# Patient Record
Sex: FEMALE | Race: WHITE | NOT HISPANIC OR LATINO | Employment: FULL TIME | URBAN - METROPOLITAN AREA
[De-identification: names, ages, dates, MRNs, and addresses within clinical notes are randomized per-mention and may not be internally consistent; named-entity substitution may affect disease eponyms.]

---

## 2017-03-09 ENCOUNTER — OFFICE VISIT (OUTPATIENT)
Dept: VASCULAR LAB | Facility: MEDICAL CENTER | Age: 52
End: 2017-03-09
Attending: INTERNAL MEDICINE
Payer: COMMERCIAL

## 2017-03-09 VITALS
BODY MASS INDEX: 21.16 KG/M2 | HEART RATE: 75 BPM | DIASTOLIC BLOOD PRESSURE: 61 MMHG | SYSTOLIC BLOOD PRESSURE: 116 MMHG | HEIGHT: 65 IN | WEIGHT: 127 LBS

## 2017-03-09 DIAGNOSIS — E78.5 DYSLIPIDEMIA: ICD-10-CM

## 2017-03-09 DIAGNOSIS — R00.2 PALPITATIONS: ICD-10-CM

## 2017-03-09 PROCEDURE — 99212 OFFICE O/P EST SF 10 MIN: CPT

## 2017-03-09 PROCEDURE — 99203 OFFICE O/P NEW LOW 30 MIN: CPT | Performed by: INTERNAL MEDICINE

## 2017-03-09 ASSESSMENT — ENCOUNTER SYMPTOMS
MYALGIAS: 0
COUGH: 0
DOUBLE VISION: 0
SPEECH CHANGE: 0
DEPRESSION: 0
PALPITATIONS: 1
BRUISES/BLEEDS EASILY: 0
NERVOUS/ANXIOUS: 1
FOCAL WEAKNESS: 0
DIARRHEA: 0
HEADACHES: 0
BLURRED VISION: 0
LOSS OF CONSCIOUSNESS: 0
DIZZINESS: 0
SHORTNESS OF BREATH: 0
CONSTIPATION: 0

## 2017-03-09 NOTE — PROGRESS NOTES
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  Had echo at that time - mvp.  Has not had echo since.  Has had intermittent palpitations since that time - worse in /feb  Great exercise tolerance.  No chest pain.   No lightheadedness. Not associated with exercise  No tia or cva symptoms  Has long h/o high LDL and high HDL  Never taken statins  TSH normal a few years ago.  No known kidney disease  No other CVD risk factors    Past Medical History   Diagnosis Date   • Anesthesia      Nausea in recovery   • Arthritis    • High cholesterol      Past Surgical History   Procedure Laterality Date   • Primary c section          • Cholecystectomy     • Blepharoplasty     • Bone biopsy Left 2016     Procedure: WRIST BIOPSY WITH POSSIBLE BONE GRAFT ALLO ;  Surgeon: Benjamin Menezes M.D.;  Location: SURGERY [facility];  Service:      Family History   Problem Relation Age of Onset   • Cancer     • Cancer Paternal Aunt      History   Smoking status   • Never Smoker    Smokeless tobacco   • Never Used     Social History   Substance Use Topics   • Smoking status: Never Smoker    • Smokeless tobacco: Never Used   • Alcohol Use: Yes      Comment: 1/ day     Outpatient Encounter Prescriptions as of 3/9/2017   Medication Sig Dispense Refill   • Calcium-Magnesium-Vitamin D (CALCIUM MAGNESIUM PO) Take  by mouth every day.     • acetaminophen (TYLENOL) 325 MG Tab Take 650 mg by mouth every four hours as needed.     • lorazepam (ATIVAN) 1 MG Tab Take 1 mg by mouth 1 time daily as needed for Anxiety.     • Ibuprofen 200 MG Cap Take 200 mg by mouth as needed.       No facility-administered encounter medications on file as of 3/9/2017.     Allergies   Allergen Reactions   •  "Penicillins Hives     DIET AND EXERCISE:  Weight Change:stable  Diet: med style diet  Exercise: moderate regular exercise program     Review of Systems   Constitutional: Negative for malaise/fatigue.   HENT: Negative for hearing loss and tinnitus.    Eyes: Negative for blurred vision and double vision.   Respiratory: Negative for cough and shortness of breath.    Cardiovascular: Positive for palpitations. Negative for chest pain and leg swelling.   Gastrointestinal: Negative for diarrhea and constipation.   Genitourinary: Negative for frequency and hematuria.   Musculoskeletal: Negative for myalgias.   Skin: Negative for rash.   Neurological: Negative for dizziness, speech change, focal weakness, loss of consciousness and headaches.   Endo/Heme/Allergies: Does not bruise/bleed easily.   Psychiatric/Behavioral: Negative for depression. The patient is nervous/anxious.       Objective:     Filed Vitals:    03/09/17 0909   BP: 116/61   Pulse: 75   Height: 1.651 m (5' 5\")   Weight: 57.607 kg (127 lb)      Body mass index is 21.13 kg/(m^2).  Physical Exam   Constitutional: She is oriented to person, place, and time. She appears well-developed and well-nourished. No distress.   HENT:   Head: Normocephalic and atraumatic.   Eyes: Conjunctivae and EOM are normal. Pupils are equal, round, and reactive to light. No scleral icterus.   Neck: Normal range of motion. Neck supple. No JVD present. No thyromegaly present.   Cardiovascular: Normal rate, regular rhythm and intact distal pulses.  Exam reveals no gallop and no friction rub.    Murmur heard.  Faint systolic murmur   Pulmonary/Chest: Effort normal and breath sounds normal. No respiratory distress. She has no wheezes. She has no rales. She exhibits no tenderness.   Abdominal: Soft. Bowel sounds are normal. She exhibits no distension and no mass. There is no tenderness. There is no rebound and no guarding.   Musculoskeletal: Normal range of motion. She exhibits no edema or " tenderness.   Neurological: She is alert and oriented to person, place, and time. She has normal reflexes. No cranial nerve deficit. Coordination normal.   Skin: Skin is warm and dry. No rash noted. She is not diaphoretic. No erythema. No pallor.   Psychiatric: She has a normal mood and affect.   Nursing note and vitals reviewed.    Lab Results   Component Value Date    CHOLSTRLTOT 210* 08/26/2016    * 08/26/2016    HDL 85 08/26/2016    TRIGLYCERIDE 72 08/26/2016           Lab Results   Component Value Date    GLUCOSE 80 08/26/2016          Medical Decision Making:  Today's Assessment / Status / Plan:     1. Palpitations  COMP METABOLIC PANEL    TSH    Echocardiogram Comp w/o Cont   2. Dyslipidemia  LIPOPROTEIN QT BLOOD BY NMR    CRP HIGH SENSITIVE (CARDIAC)     Patient Type: Primary Prevention    Etiology of Established CVD if Present: 1%    Lipid Management: Qualifies for Statin Therapy Based on 2013 ACC/AHA Guidelines: no  Calculated 10-Year Risk of ASCVD: 1%  Currently on Statin: No  Appears to be low risk.  - Will check NMR lipoprofile and CRP for further risk assessement  - not interested in cor calcium score at present - which seems reasonable  - continue TLC    Blood Pressure Management:Goal: JNC8 (2013) Office BP Goal:<140/90; Under Control: yes  - continue lifestyle mod    Glycemic Status: Normal  - check fasting glucose    Anti-Platelet/Anti-Coagulant Tx: not indicated    Smoking: continue complete avoidance     Physical Activity: continue excellent exercise habits    Weight Management and Nutrition: Dietary plan was discussed with patient at this visit including continued med style diet    Other:     1.  Palpitations - long standing.  Apparently associated with MVP.  Will recheck echo and TSH and electrolytes.  Consider rhythm monitor only if worsens in frequency, intensity, or develops associated symptoms like lightheadedness.    Instructed to follow-up with PCP for remainder of adult medical  needs: yes  We will partner with other providers in the management of established vascular disease and cardiometabolic risk factors.    Studies to Be Obtained: Echo  Labs to Be Obtained: NMR lipoprofile, CMP, TSH, crp    Follow up in: by phone when results available    Michael J Bloch, M.D.

## 2017-03-14 ENCOUNTER — HOSPITAL ENCOUNTER (OUTPATIENT)
Dept: LAB | Facility: MEDICAL CENTER | Age: 52
End: 2017-03-14
Attending: INTERNAL MEDICINE
Payer: COMMERCIAL

## 2017-03-14 DIAGNOSIS — R00.2 PALPITATIONS: ICD-10-CM

## 2017-03-14 DIAGNOSIS — E78.5 DYSLIPIDEMIA: ICD-10-CM

## 2017-03-14 LAB
ALBUMIN SERPL BCP-MCNC: 4.1 G/DL (ref 3.2–4.9)
ALBUMIN/GLOB SERPL: 1.4 G/DL
ALP SERPL-CCNC: 37 U/L (ref 30–99)
ALT SERPL-CCNC: 14 U/L (ref 2–50)
ANION GAP SERPL CALC-SCNC: 5 MMOL/L (ref 0–11.9)
AST SERPL-CCNC: 17 U/L (ref 12–45)
BILIRUB SERPL-MCNC: 0.4 MG/DL (ref 0.1–1.5)
BUN SERPL-MCNC: 14 MG/DL (ref 8–22)
CALCIUM SERPL-MCNC: 9.4 MG/DL (ref 8.5–10.5)
CHLORIDE SERPL-SCNC: 103 MMOL/L (ref 96–112)
CO2 SERPL-SCNC: 26 MMOL/L (ref 20–33)
CREAT SERPL-MCNC: 0.91 MG/DL (ref 0.5–1.4)
CRP SERPL HS-MCNC: 0.4 MG/L (ref 0–7.5)
GLOBULIN SER CALC-MCNC: 2.9 G/DL (ref 1.9–3.5)
GLUCOSE SERPL-MCNC: 90 MG/DL (ref 65–99)
POTASSIUM SERPL-SCNC: 4.3 MMOL/L (ref 3.6–5.5)
PROT SERPL-MCNC: 7 G/DL (ref 6–8.2)
SODIUM SERPL-SCNC: 134 MMOL/L (ref 135–145)
TSH SERPL DL<=0.005 MIU/L-ACNC: 4 UIU/ML (ref 0.3–3.7)

## 2017-03-14 PROCEDURE — 80061 LIPID PANEL: CPT

## 2017-03-14 PROCEDURE — 86141 C-REACTIVE PROTEIN HS: CPT

## 2017-03-14 PROCEDURE — 36415 COLL VENOUS BLD VENIPUNCTURE: CPT

## 2017-03-14 PROCEDURE — 80053 COMPREHEN METABOLIC PANEL: CPT

## 2017-03-14 PROCEDURE — 84443 ASSAY THYROID STIM HORMONE: CPT

## 2017-03-14 PROCEDURE — 83704 LIPOPROTEIN BLD QUAN PART: CPT

## 2017-03-17 LAB
CHOLEST SERPL-MCNC: 206 MG/DL (ref 100–199)
HDL PARTICAL NO Q4363: 42.7 UMOL/L
HDL SERPL QN: 10 NM
HDLC SERPL-MCNC: 84 MG/DL
HLD.LARGE SERPL-SCNC: 13.2 UMOL/L
LDL MED SERPL QN: 22.2 NM
LDL SERPL QN: 22.2 NM
LDL SERPL-SCNC: 1009 NMOL/L
LDL SMALL SERPL-SCNC: <90 NMOL/L
LDL SMALL SERPL-SCNC: <90 NMOL/L
LDLC SERPL CALC-MCNC: 107 MG/DL (ref 0–99)
LP IR SCORE Q4364: <25
TRIGL SERPL-MCNC: 76 MG/DL (ref 0–149)
VLDL LARGE SERPL-SCNC: 1.5 NMOL/L
VLDL SERPL QN: 47 NM

## 2017-04-03 ENCOUNTER — HOSPITAL ENCOUNTER (OUTPATIENT)
Dept: CARDIOLOGY | Facility: MEDICAL CENTER | Age: 52
End: 2017-04-03
Attending: INTERNAL MEDICINE
Payer: COMMERCIAL

## 2017-04-03 DIAGNOSIS — R00.2 PALPITATIONS: ICD-10-CM

## 2017-04-03 LAB — LV EJECT FRACT  99904: 60

## 2017-04-03 PROCEDURE — 93306 TTE W/DOPPLER COMPLETE: CPT

## 2017-04-03 PROCEDURE — 93306 TTE W/DOPPLER COMPLETE: CPT | Mod: 26 | Performed by: INTERNAL MEDICINE

## 2017-04-06 ENCOUNTER — TELEPHONE (OUTPATIENT)
Dept: VASCULAR LAB | Facility: MEDICAL CENTER | Age: 52
End: 2017-04-06

## 2017-04-06 NOTE — TELEPHONE ENCOUNTER
Discussed lab work with patient at that on the phone today.    Her LDL particle size and small LDL particle number are favorable. Her CRP is favorable. I recommend continued lifestyle modification alone at this time.     Her TSH is 4.0, but she has no symptoms of hypothyroidism. I would suggest we continue to follow her TFTs    I have recommended she repeat her blood work and come back to see me in one year    Michael J. Bloch, MD  [facility]    CC: Gus Tate M.D.

## 2017-09-08 ENCOUNTER — EH NON-PROVIDER (OUTPATIENT)
Dept: OCCUPATIONAL MEDICINE | Facility: CLINIC | Age: 52
End: 2017-09-08

## 2017-09-08 PROCEDURE — 94375 RESPIRATORY FLOW VOLUME LOOP: CPT

## 2017-10-17 ENCOUNTER — IMMUNIZATION (OUTPATIENT)
Dept: OCCUPATIONAL MEDICINE | Facility: CLINIC | Age: 52
End: 2017-10-17

## 2017-10-17 DIAGNOSIS — Z23 NEED FOR VACCINATION: ICD-10-CM

## 2017-10-17 PROCEDURE — 90686 IIV4 VACC NO PRSV 0.5 ML IM: CPT | Performed by: PREVENTIVE MEDICINE

## 2017-10-30 ENCOUNTER — HOSPITAL ENCOUNTER (OUTPATIENT)
Dept: RADIOLOGY | Facility: MEDICAL CENTER | Age: 52
End: 2017-10-30
Attending: OBSTETRICS & GYNECOLOGY
Payer: COMMERCIAL

## 2017-10-30 DIAGNOSIS — Z12.31 SCREENING MAMMOGRAM, ENCOUNTER FOR: ICD-10-CM

## 2017-10-30 PROCEDURE — G0202 SCR MAMMO BI INCL CAD: HCPCS

## 2018-06-15 ENCOUNTER — HOSPITAL ENCOUNTER (OUTPATIENT)
Dept: LAB | Facility: MEDICAL CENTER | Age: 53
End: 2018-06-15
Payer: COMMERCIAL

## 2018-06-15 LAB
BDY FAT % MEASURED: 24.7 %
BDY FAT % MEASURED: NORMAL %
BP DIAS: 46 MMHG
BP DIAS: NORMAL MMHG
BP SYS: 107 MMHG
BP SYS: NORMAL MMHG
CHOLEST SERPL-MCNC: 206 MG/DL (ref 100–199)
DIABETES HTDIA: NO
DIABETES HTDIA: NORMAL
EVENT NAME HTEVT: NORMAL
EVENT NAME HTEVT: NORMAL
FASTING HTFAS: YES
GLUCOSE SERPL-MCNC: 71 MG/DL (ref 65–99)
HDLC SERPL-MCNC: 82 MG/DL
HYPERTENSION HTHYP: NO
HYPERTENSION HTHYP: NORMAL
LDLC SERPL CALC-MCNC: 113 MG/DL
SCREENING LOC CITY HTCIT: NORMAL
SCREENING LOC CITY HTCIT: NORMAL
SCREENING LOC STATE HTSTA: NORMAL
SCREENING LOC STATE HTSTA: NORMAL
SCREENING LOCATION HTLOC: NORMAL
SCREENING LOCATION HTLOC: NORMAL
SMOKING HTSMO: NO
SMOKING HTSMO: NORMAL
SUBSCRIBER ID HTSID: NORMAL
SUBSCRIBER ID HTSID: NORMAL
TRIGL SERPL-MCNC: 56 MG/DL (ref 0–149)

## 2018-06-15 PROCEDURE — 36415 COLL VENOUS BLD VENIPUNCTURE: CPT

## 2018-06-15 PROCEDURE — 82947 ASSAY GLUCOSE BLOOD QUANT: CPT

## 2018-06-15 PROCEDURE — 80061 LIPID PANEL: CPT

## 2018-06-15 PROCEDURE — S5190 WELLNESS ASSESSMENT BY NONPH: HCPCS

## 2018-06-15 PROCEDURE — S5190 WELLNESS ASSESSMENT BY NONPH: HCPCS | Mod: 91

## 2018-06-18 ENCOUNTER — HOSPITAL ENCOUNTER (OUTPATIENT)
Dept: LAB | Facility: MEDICAL CENTER | Age: 53
End: 2018-06-18
Payer: COMMERCIAL

## 2018-08-20 ENCOUNTER — TELEPHONE (OUTPATIENT)
Dept: VASCULAR LAB | Facility: MEDICAL CENTER | Age: 53
End: 2018-08-20

## 2018-08-20 NOTE — TELEPHONE ENCOUNTER
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 I asked her if she was interested in coming in this fall to follow up with Dr. Bloch. She said she is, but she just got a new PCP and she wants to see him 1st and see if maybe he can help her with all her medical needs just so she doesn't have to go to so many doctors. She does want to see Dr. Bloch again, but wants to see him 1st.

## 2018-08-23 ENCOUNTER — OFFICE VISIT (OUTPATIENT)
Dept: MEDICAL GROUP | Facility: MEDICAL CENTER | Age: 53
End: 2018-08-23
Payer: COMMERCIAL

## 2018-08-23 VITALS
BODY MASS INDEX: 20.53 KG/M2 | SYSTOLIC BLOOD PRESSURE: 112 MMHG | RESPIRATION RATE: 18 BRPM | OXYGEN SATURATION: 97 % | DIASTOLIC BLOOD PRESSURE: 76 MMHG | TEMPERATURE: 97.8 F | HEIGHT: 65 IN | WEIGHT: 123.24 LBS | HEART RATE: 72 BPM

## 2018-08-23 DIAGNOSIS — F41.9 ANXIETY: ICD-10-CM

## 2018-08-23 DIAGNOSIS — Z00.00 HEALTHCARE MAINTENANCE: ICD-10-CM

## 2018-08-23 DIAGNOSIS — Z72.89 OTHER PROBLEMS RELATED TO LIFESTYLE: ICD-10-CM

## 2018-08-23 DIAGNOSIS — L82.1 SEBORRHEIC KERATOSES: ICD-10-CM

## 2018-08-23 DIAGNOSIS — E78.5 DYSLIPIDEMIA: ICD-10-CM

## 2018-08-23 DIAGNOSIS — M54.2 NECK PAIN: ICD-10-CM

## 2018-08-23 PROCEDURE — 99204 OFFICE O/P NEW MOD 45 MIN: CPT | Mod: 25 | Performed by: FAMILY MEDICINE

## 2018-08-23 PROCEDURE — 17110 DESTRUCTION B9 LES UP TO 14: CPT | Performed by: FAMILY MEDICINE

## 2018-08-23 ASSESSMENT — PATIENT HEALTH QUESTIONNAIRE - PHQ9: CLINICAL INTERPRETATION OF PHQ2 SCORE: 0

## 2018-08-23 NOTE — ASSESSMENT & PLAN NOTE
The patient describes 3 skin lesions, one on her face, one on her right chest and one on her leg. They are bothersome to her because they tend to catch on things and occasionally itch. She has successfully had cryotherapy in the past for these issues.

## 2018-08-23 NOTE — LETTER
[facility]  Ivan Cunha M.D.  [address]  [place] [address]  Fax: [phone]   Authorization for Release/Disclosure of   Protected Health Information   Name: AMENA WERNER MD : 1965 SSN: [id number]   Address: [address] [place] [address] [place] [address] Phone:    [phone] (home) [phone] (work)   I authorize the entity listed below to release/disclose the PHI below to:   [facility]/Ivan Cunha M.D. and Rosemary VILLALOBOS MA   Provider or Entity Name:  [facility]   Address   City, State, Zip:               [address] [place] [address] [place] [address]   Phone:  [phone]      Fax:      [phone]        Reason for request: continuity of care   Information to be released:    [ X ] LAST COLONOSCOPY,  including any PATH REPORT and follow-up  [ X ] LAST FIT/COLOGUARD RESULT [  ] LAST DEXA  [  ] LAST MAMMOGRAM  [  ] LAST PAP  [  ] LAST LABS [  ] RETINA EXAM REPORT  [  ] IMMUNIZATION RECORDS  [  ] Release all info      [  ] Check here and initial the line next to each item to release ALL health information INCLUDING  _____ Care and treatment for drug and / or alcohol abuse  _____ HIV testing, infection status, or AIDS  _____ Genetic Testing    DATES OF SERVICE OR TIME PERIOD TO BE DISCLOSED: _____________  I understand and acknowledge that:  * This Authorization may be revoked at any time by you in writing, except if your health information has already been used or disclosed.  * Your health information that will be used or disclosed as a result of you signing this authorization could be re-disclosed by the recipient. If this occurs, your re-disclosed health information may no longer be protected by State or Federal laws.  * You may refuse to sign this Authorization. Your refusal will not affect your ability to obtain treatment.  * This Authorization becomes effective upon signing and will  on (date) __________.      If no date is indicated, this  Authorization will  one (1) year from the signature date.    Name: Christa Juarez MD    Signature:   Date:     2018       PLEASE FAX REQUESTED RECORDS BACK TO: [phone]

## 2018-08-23 NOTE — LETTER
[facility]  Ivan Cunha M.D.  [address]  [place] [address]  Fax: [phone]   Authorization for Release/Disclosure of   Protected Health Information   Name: CHRISTA WERNER MD : 1965 SSN: [id number]   Address: [address] [place] [address] [address] [place] [address] Phone:    [phone] (home) [phone] (work)   I authorize the entity listed below to release/disclose the PHI below to:   [facility]/Ivan Cunha M.D. and Ivan Cunha M.D.   Provider or Entity Name:     Address   City, State, Zip   Phone:      Fax:     Reason for request: continuity of care   Information to be released:    [  ] LAST COLONOSCOPY,  including any PATH REPORT and follow-up  [  ] LAST FIT/COLOGUARD RESULT [  ] LAST DEXA  [  ] LAST MAMMOGRAM  [  ] LAST PAP  [  ] LAST LABS [  ] RETINA EXAM REPORT  [  ] IMMUNIZATION RECORDS  [  ] Release all info      [  ] Check here and initial the line next to each item to release ALL health information INCLUDING  _____ Care and treatment for drug and / or alcohol abuse  _____ HIV testing, infection status, or AIDS  _____ Genetic Testing    DATES OF SERVICE OR TIME PERIOD TO BE DISCLOSED: _____________  I understand and acknowledge that:  * This Authorization may be revoked at any time by you in writing, except if your health information has already been used or disclosed.  * Your health information that will be used or disclosed as a result of you signing this authorization could be re-disclosed by the recipient. If this occurs, your re-disclosed health information may no longer be protected by State or Federal laws.  * You may refuse to sign this Authorization. Your refusal will not affect your ability to obtain treatment.  * This Authorization becomes effective upon signing and will  on (date) __________.      If no date is indicated, this Authorization will  one (1) year from the signature date.    Name: Christa Werner MD    Signature:   Date:     8/23/2018       PLEASE FAX REQUESTED RECORDS BACK TO: [phone]

## 2018-08-23 NOTE — LETTER
[facility]  Ivan Cunha M.D.  [address]  [place] [address]  Fax: [phone]   Authorization for Release/Disclosure of   Protected Health Information   Name: CHRISTA WERENR MD : 1965 SSN: [id number]   Address: [address] [place] [address] [place] [address] Phone:    [phone] (home) [phone] (work)   I authorize the entity listed below to release/disclose the PHI below to:   [facility]/Ivan Cunha M.D. and Ivan Cunha M.D.   Provider or Entity Name:     Address   City, State, Zip   Phone:      Fax:     Reason for request: continuity of care   Information to be released:    [  ] LAST COLONOSCOPY,  including any PATH REPORT and follow-up  [  ] LAST FIT/COLOGUARD RESULT [  ] LAST DEXA  [  ] LAST MAMMOGRAM  [  ] LAST PAP  [  ] LAST LABS [  ] RETINA EXAM REPORT  [  ] IMMUNIZATION RECORDS  [  ] Release all info      [  ] Check here and initial the line next to each item to release ALL health information INCLUDING  _____ Care and treatment for drug and / or alcohol abuse  _____ HIV testing, infection status, or AIDS  _____ Genetic Testing    DATES OF SERVICE OR TIME PERIOD TO BE DISCLOSED: _____________  I understand and acknowledge that:  * This Authorization may be revoked at any time by you in writing, except if your health information has already been used or disclosed.  * Your health information that will be used or disclosed as a result of you signing this authorization could be re-disclosed by the recipient. If this occurs, your re-disclosed health information may no longer be protected by State or Federal laws.  * You may refuse to sign this Authorization. Your refusal will not affect your ability to obtain treatment.  * This Authorization becomes effective upon signing and will  on (date) __________.      If no date is indicated, this Authorization will  one (1) year from the signature date.    Name: Christa Werner MD    Signature:   Date:     8/23/2018       PLEASE FAX REQUESTED RECORDS BACK TO: [phone]

## 2018-08-23 NOTE — ASSESSMENT & PLAN NOTE
The patient describes a long-standing history of neck pain with some MRI abnormalities she states are not surgical. She uses Ativan as needed for associated spasm and this seems to help quite a bit. Her pain can be moderate in severity.

## 2018-08-23 NOTE — ASSESSMENT & PLAN NOTE
The patient describes a several year history of intermittent, mild to moderate severity anxiety that improves with exercise and occasional Ativan use. She takes Ativan 1 mg as needed, about 3 times per week on average, for both her anxiety and occasional neck pains. There is no associated depression.

## 2018-08-23 NOTE — PROGRESS NOTES
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 This is a new patient to me. The patient comes in alone.     Healthcare maintenance  Lipids: done 2018 with 10 year ASCVD risk 1.2%. No indication for aspirin or statin.  Fasting Glucose: done 2018 and normal.   Hepatitis C Screen: Ordered.  Colonoscopy: Records requested.  Mammogram: done 2017  Pap: will schedule with our physician assistant.    Tdap: given 2014.     Anxiety  The patient describes a several year history of intermittent, mild to moderate severity anxiety that improves with exercise and occasional Ativan use. She takes Ativan 1 mg as needed, about 3 times per week on average, for both her anxiety and occasional neck pains. There is no associated depression.    Neck pain  The patient describes a long-standing history of neck pain with some MRI abnormalities she states are not surgical. She uses Ativan as needed for associated spasm and this seems to help quite a bit. Her pain can be moderate in severity.    Seborrheic keratoses  The patient describes 3 skin lesions, one on her face, one on her right chest and one on her leg. They are bothersome to her because they tend to catch on things and occasionally itch. She has successfully had cryotherapy in the past for these issues.    Dyslipidemia  The patient has a mild dyslipidemia without the need for aspirin or statin currently.      Current Outpatient Prescriptions on File Prior to Visit   Medication Sig Dispense Refill   • lorazepam (ATIVAN) 1 MG Tab Take 1 mg by mouth 1 time daily as needed for Anxiety.     • Ibuprofen 200 MG Cap Take 200 mg by mouth as needed.     • Calcium-Magnesium-Vitamin D (CALCIUM MAGNESIUM PO) Take  by mouth every day.       No current facility-administered medications on file prior to visit.        Past Medical History:   Diagnosis Date  "  • Anesthesia     Nausea in recovery   • Arthritis    • High cholesterol        Allergies: Penicillins    Surgical History:  has a past surgical history that includes primary c section (1998); cholecystectomy (1999); blepharoplasty (2008); and bone biopsy (Left, 2/12/2016).    Family History: family history includes Arthritis in her brother and mother; Cancer in her brother, paternal aunt, and unknown relative; Hyperlipidemia in her mother.    Social History:  reports that she has never smoked. She has never used smokeless tobacco. She reports that she drinks alcohol. She reports that she does not use drugs.    ROS:   Constitutional ROS: No unexplained fevers, sweats, or chills  Eye ROS: No eye pain, redness, discharge  Ear ROS: No ear pain  Mouth/Throat ROS: No sore throat  Neck ROS: No swollen glands  Pulmonary ROS: No shortness of breath  Cardiovascular ROS: No chest pain  Gastrointestinal ROS: No abdominal pain  Musculoskeletal/Extremities ROS: No clubbing  Neurologic ROS: No seizures  Psychiatric ROS: No depression       Objective:     Vitals:    08/23/18 0909   BP: 112/76   Pulse: 72   Resp: 18   Temp: 36.6 °C (97.8 °F)   SpO2: 97%   Weight: 55.9 kg (123 lb 3.8 oz)   Height: 1.651 m (5' 5\")       Physical Exam:  Constitutional: Alert, no distress.  Skin: Warm, dry, good turgor, no rashes in visible areas. There are 3 small skin lesions with an \"stuck on\" appearance that are slightly pigmented. One is located on the left thigh, one is located on the right forehead and one is located on the right chest.   Eye: Equal, round and reactive, conjunctiva clear, lids normal.  ENMT: Lips without lesions, good dentition, oropharynx clear.  Neck: Trachea midline, no masses, no thyromegaly. No cervical or supraclavicular lymphadenopathy  Respiratory: Unlabored respiratory effort, lungs clear to auscultation, no wheezes, no ronchi.  Cardiovascular: Normal S1, S2, no murmur, no edema.  Abdomen: Soft, non-tender, no masses. "   Psych: Alert and oriented, normal affect.     Cryotherapy Procedure Note:  I discussed the risks and benefits of cryotherapy with the patient who gave verbal consent for the procedure. Two applications of cryotherapy were applied to the three SK's described in the exam. The patient tolerated the procedure well and there were no complications. Aftercare was discussed.      Assessment and Plan:     1. Healthcare maintenance  - see HPI.   - discussed diet and exercise, Mediterranean Diet handout given.   - patient will schedule with one of our PA-C's for a pap.     2. Seborrheic keratoses  - cryotherapy (see procedure note above).     3. Neck pain  Patient treats supportive with stretching and rare ativan.   - continue current treatment.     4. Other problems related to lifestyle  - HEP C VIRUS ANTIBODY; Future    5. Anxiety  - continue exercise, rare ativan use.     6. Dyslipidemia  - discussed diet and exercise, Mediterranean Diet handout given.         Followup: Return in about 1 year (around 8/23/2019), or if symptoms worsen or fail to improve.

## 2018-08-23 NOTE — ASSESSMENT & PLAN NOTE
Lipids: done 2018 with 10 year ASCVD risk 1.2%. No indication for aspirin or statin.  Fasting Glucose: done 2018 and normal.   Hepatitis C Screen: Ordered.  Colonoscopy: Records requested.  Mammogram: done 2017  Pap: will schedule with our physician assistant.    Tdap: given 2014.

## 2018-10-01 ENCOUNTER — IMMUNIZATION (OUTPATIENT)
Dept: OCCUPATIONAL MEDICINE | Facility: CLINIC | Age: 53
End: 2018-10-01

## 2018-10-01 DIAGNOSIS — Z23 NEED FOR VACCINATION: ICD-10-CM

## 2018-10-04 ENCOUNTER — OFFICE VISIT (OUTPATIENT)
Dept: MEDICAL GROUP | Facility: MEDICAL CENTER | Age: 53
End: 2018-10-04
Payer: COMMERCIAL

## 2018-10-04 ENCOUNTER — HOSPITAL ENCOUNTER (OUTPATIENT)
Facility: MEDICAL CENTER | Age: 53
End: 2018-10-04
Attending: PHYSICIAN ASSISTANT
Payer: COMMERCIAL

## 2018-10-04 VITALS
SYSTOLIC BLOOD PRESSURE: 102 MMHG | WEIGHT: 126.6 LBS | HEART RATE: 79 BPM | BODY MASS INDEX: 21.09 KG/M2 | HEIGHT: 65 IN | OXYGEN SATURATION: 98 % | DIASTOLIC BLOOD PRESSURE: 70 MMHG

## 2018-10-04 DIAGNOSIS — Z12.4 SCREENING FOR CERVICAL CANCER: ICD-10-CM

## 2018-10-04 DIAGNOSIS — Z00.00 WELLNESS EXAMINATION: ICD-10-CM

## 2018-10-04 PROCEDURE — 87624 HPV HI-RISK TYP POOLED RSLT: CPT

## 2018-10-04 PROCEDURE — 99396 PREV VISIT EST AGE 40-64: CPT | Performed by: PHYSICIAN ASSISTANT

## 2018-10-04 PROCEDURE — 88175 CYTOPATH C/V AUTO FLUID REDO: CPT

## 2018-10-04 NOTE — PROGRESS NOTES
"SUBJECTIVE: 53 y.o. female for annual routine pap and checkup.  Chief Complaint   Patient presents with   • Gynecologic Exam       Last Pap: 2 years ago  H/O Abnormal Pap yes, years ago, recently normal       Allergies: Penicillins     ROS:  Menses every month, having some perimenopausal changes including hot flashes and irritability    No pelvic pain, or dyspareunia. No vaginal discharge   No breast tenderness, mass, nipple discharge, changes in size or contour, or abnormal cyclic discomfort.  No urinary tract symptoms, no incontinence.   No abdominal pain, change in bowel habits, black or bloody stools.    No unusual headaches, no visual changes.   No prolonged cough. No dyspnea or chest pain on exertion.        Current Outpatient Prescriptions   Medication Sig Dispense Refill   • lorazepam (ATIVAN) 1 MG Tab Take 1 mg by mouth 1 time daily as needed for Anxiety.     • Ibuprofen 200 MG Cap Take 200 mg by mouth as needed.     • Calcium-Magnesium-Vitamin D (CALCIUM MAGNESIUM PO) Take  by mouth every day.       No current facility-administered medications for this visit.      She  has a past medical history of Anesthesia; Arthritis; and High cholesterol.  She  has a past surgical history that includes primary c section (1998); cholecystectomy (1999); blepharoplasty (2008); and bone biopsy (Left, 2/12/2016).     Family History:   Family History   Problem Relation Age of Onset   • Cancer Unknown    • Cancer Paternal Aunt    • Arthritis Mother    • Hyperlipidemia Mother    • Arthritis Brother    • Cancer Brother         Prostate     OBJECTIVE:   /70 (BP Location: Right arm, Patient Position: Sitting)   Pulse 79   Ht 1.651 m (5' 5\")   Wt 57.4 kg (126 lb 9.6 oz)   SpO2 98%   BMI 21.07 kg/m²   Body mass index is 21.07 kg/m².    Breast Exam: Performed with instruction during examination. No axillary lymphadenopathy, no skin changes, no dominant masses. No nipple retraction  Pelvic Exam - Sure Path Pap obtained and " specimen sent to lab. Normal external genitalia with no lesions. Normal vaginal mucosa with normal rugation and no discharge. Cervix with no visible lesions. No cervical motion tenderness. Uterus is normal sized with no masses. No adnexal tenderness or enlargement appreciated.      <ASSESSMENT>  1. Wellness examination     2. Screening for cervical cancer  THINPREP PAP WITH HPV     F/u with Dr. Cunha as scheduled

## 2018-10-05 DIAGNOSIS — Z12.4 SCREENING FOR CERVICAL CANCER: ICD-10-CM

## 2018-10-06 LAB
CYTOLOGY REG CYTOL: NORMAL
HPV HR 12 DNA CVX QL NAA+PROBE: NEGATIVE
HPV16 DNA SPEC QL NAA+PROBE: NEGATIVE
HPV18 DNA SPEC QL NAA+PROBE: NEGATIVE
SPECIMEN SOURCE: NORMAL

## 2018-10-15 PROCEDURE — 90686 IIV4 VACC NO PRSV 0.5 ML IM: CPT | Performed by: PREVENTIVE MEDICINE

## 2018-11-14 ENCOUNTER — HOSPITAL ENCOUNTER (OUTPATIENT)
Dept: RADIOLOGY | Facility: MEDICAL CENTER | Age: 53
End: 2018-11-14
Attending: FAMILY MEDICINE
Payer: COMMERCIAL

## 2018-11-14 DIAGNOSIS — Z12.31 VISIT FOR SCREENING MAMMOGRAM: ICD-10-CM

## 2018-11-14 PROCEDURE — 77067 SCR MAMMO BI INCL CAD: CPT

## 2019-06-11 ENCOUNTER — HOSPITAL ENCOUNTER (OUTPATIENT)
Dept: LAB | Facility: MEDICAL CENTER | Age: 54
End: 2019-06-11
Payer: COMMERCIAL

## 2019-06-11 ENCOUNTER — HOSPITAL ENCOUNTER (OUTPATIENT)
Dept: LAB | Facility: MEDICAL CENTER | Age: 54
End: 2019-06-11
Attending: FAMILY MEDICINE
Payer: COMMERCIAL

## 2019-06-11 DIAGNOSIS — Z72.89 OTHER PROBLEMS RELATED TO LIFESTYLE: ICD-10-CM

## 2019-06-11 LAB
BDY FAT % MEASURED: 25.8 %
BP DIAS: 58 MMHG
BP SYS: 103 MMHG
CHOLEST SERPL-MCNC: 219 MG/DL (ref 100–199)
DIABETES HTDIA: NO
EVENT NAME HTEVT: NORMAL
FASTING HTFAS: YES
GLUCOSE SERPL-MCNC: 89 MG/DL (ref 65–99)
HCV AB SER QL: NEGATIVE
HDLC SERPL-MCNC: 81 MG/DL
HYPERTENSION HTHYP: NO
LDLC SERPL CALC-MCNC: 123 MG/DL
SCREENING LOC CITY HTCIT: NORMAL
SCREENING LOC STATE HTSTA: NORMAL
SCREENING LOCATION HTLOC: NORMAL
SMOKING HTSMO: NO
SUBSCRIBER ID HTSID: NORMAL
TRIGL SERPL-MCNC: 73 MG/DL (ref 0–149)

## 2019-06-11 PROCEDURE — 36415 COLL VENOUS BLD VENIPUNCTURE: CPT

## 2019-06-11 PROCEDURE — S5190 WELLNESS ASSESSMENT BY NONPH: HCPCS

## 2019-06-11 PROCEDURE — 82947 ASSAY GLUCOSE BLOOD QUANT: CPT

## 2019-06-11 PROCEDURE — 86803 HEPATITIS C AB TEST: CPT

## 2019-06-11 PROCEDURE — 80061 LIPID PANEL: CPT

## 2019-08-15 ENCOUNTER — OFFICE VISIT (OUTPATIENT)
Dept: MEDICAL GROUP | Facility: MEDICAL CENTER | Age: 54
End: 2019-08-15
Payer: COMMERCIAL

## 2019-08-15 VITALS
RESPIRATION RATE: 18 BRPM | OXYGEN SATURATION: 97 % | SYSTOLIC BLOOD PRESSURE: 110 MMHG | DIASTOLIC BLOOD PRESSURE: 72 MMHG | WEIGHT: 122.6 LBS | HEIGHT: 65 IN | TEMPERATURE: 98.3 F | HEART RATE: 75 BPM | BODY MASS INDEX: 20.43 KG/M2

## 2019-08-15 DIAGNOSIS — M54.2 NECK PAIN: ICD-10-CM

## 2019-08-15 DIAGNOSIS — E78.5 DYSLIPIDEMIA: ICD-10-CM

## 2019-08-15 DIAGNOSIS — Z00.00 HEALTHCARE MAINTENANCE: ICD-10-CM

## 2019-08-15 PROCEDURE — 99396 PREV VISIT EST AGE 40-64: CPT | Performed by: FAMILY MEDICINE

## 2019-08-15 RX ORDER — LORAZEPAM 1 MG/1
1 TABLET ORAL 2 TIMES DAILY PRN
Qty: 60 TAB | Refills: 0 | Status: SHIPPED | OUTPATIENT
Start: 2019-08-15 | End: 2020-02-11 | Stop reason: SDUPTHER

## 2019-08-15 ASSESSMENT — PATIENT HEALTH QUESTIONNAIRE - PHQ9: CLINICAL INTERPRETATION OF PHQ2 SCORE: 0

## 2019-08-15 NOTE — ASSESSMENT & PLAN NOTE
Lipids: done 2019.  Fasting Glucose: done 2019.   Hepatitis C Screen: done 2019 and normal.   Colonoscopy: Records re-requested.  Mammogram: completed.  Pap: done 10/2018 with cotesting and normal.     Tdap: given 2014.   Shingrix: recommended, patient will get at pharmacy.

## 2019-08-15 NOTE — ASSESSMENT & PLAN NOTE
The patient describes a long-standing history of neck pain with some MRI abnormalities she stated at the last visit are not surgical, however, today she states that she was told she needed emergency surgery over 10 years ago.  States she is doing well and does not believe she needs surgery.  She uses Ativan as needed for associated spasm and this seems to help quite a bit. She hasn't refilled this in some time.  She denies paresthesias or weakness.  She denies gait disturbance.

## 2019-08-15 NOTE — PROGRESS NOTES
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 The patient is alone.     Dyslipidemia  Patient has a slight dyslipidemia with a 10-year ASCVD risk of just over 1%.    Healthcare maintenance  Lipids: done 2019.  Fasting Glucose: done 2019.   Hepatitis C Screen: done 2019 and normal.   Colonoscopy: Records re-requested.  Mammogram: completed.  Pap: done 10/2018 with cotesting and normal.     Tdap: given 2014.   Shingrix: recommended, patient will get at pharmacy.     Neck pain  The patient describes a long-standing history of neck pain with some MRI abnormalities she stated at the last visit are not surgical, however, today she states that she was told she needed emergency surgery over 10 years ago.  States she is doing well and does not believe she needs surgery.  She uses Ativan as needed for associated spasm and this seems to help quite a bit. She hasn't refilled this in some time.  She denies paresthesias or weakness.  She denies gait disturbance.    Controlled Substance Assessment:     - Is the medication, if previously prescribed, working as intended and expected to treat   the patients symptoms: yes.     - Does the patient have a history of substance abuse: no.     - has the patient demonstrated aberrant behavior or intoxication: no.     - Is there reason to believe that the patient is not using medication as prescribed or diverting the medication: no.     - Is there reason to believe that the patient is currently misusing this medication or addicted to the controlled substance: no.     - Is it necessary to verify that controlled substances, other that those authorized under the treatment plan, are not present in the body of this patient: no.    - Are there any blood or urine test that indicate inappropriate use of the medication or other controlled substances : no.     - I have reviewed the . Does the   report irregular/inconsistent medication use or indicate that the medication is being used inappropriately or that the patient is using another controlled substance not prescribed or known to me: no.     - Is there reason to believe that the patient is using other drugs, including alcohol, prescription or illicit drugs, that may interact negatively with controlled substance or have not been prescribed by a practitioner who is treating the patient: no.     - Are there any known early refill attempts or claims that medication has been lost or stolen: no.     - Are there are any major changes in the patient's mental or physical health that would affect the medical appropriateness of this medication: no.     - Has the patient increased the dose of medication without provider authorization: no.    - Has the patient been reluctant to cooperate with any exam, analysis or test recommended by me: no.     - Has the patient demonstrated reluctance to stop or reduce use of the medicine: no.     - Has the patient requested or demanded a controlled substance that is likely to be abused or cause dependency or addiction: requests ativan, uses sparingly.     - Is there any other evidence that the patient is chronically using opioids, misusing, abusing, illegally using or addicted to any drug or failing to comply with the instructions of the practitioner concerning the use of the controlled substance: no    - Are there any other factors that the practitioner determines is necessary to make an informed professional judgment concerning the medical appropriateness of the prescription: no.       Current Outpatient Medications on File Prior to Visit   Medication Sig Dispense Refill   • Ibuprofen 200 MG Cap Take 200 mg by mouth as needed.     • Calcium-Magnesium-Vitamin D (CALCIUM MAGNESIUM PO) Take  by mouth every day.       No current facility-administered medications on file prior to visit.        Past Medical History:   Diagnosis  "Date   • Anesthesia     Nausea in recovery   • Arthritis    • High cholesterol        Allergies: Penicillins    Surgical History:  has a past surgical history that includes primary c section (1998); cholecystectomy (1999); blepharoplasty (2008); and bone biopsy (Left, 2/12/2016).    Family History: family history includes Arthritis in her brother and mother; Cancer in her brother, paternal aunt, and another family member; Hyperlipidemia in her mother.    Social History:  reports that she has never smoked. She has never used smokeless tobacco. She reports that she drinks alcohol. She reports that she does not use drugs.    ROS: no fever or nausea.        Objective:     Vitals:    08/15/19 1307   BP: 110/72   BP Location: Left arm   Patient Position: Sitting   BP Cuff Size: Adult   Pulse: 75   Resp: 18   Temp: 36.8 °C (98.3 °F)   TempSrc: Temporal   SpO2: 97%   Weight: 55.6 kg (122 lb 9.6 oz)   Height: 1.651 m (5' 5\")       Physical Exam:  General: alert in no apparent distress.   Cardio: regular rate and rhythm, no murmurs, rubs or gallops.  2+ radial pulses bilaterally.   Resp: CTAB no w/r/r.   Neuro: Sensation intact in all dermatomal distributions of the bilateral upper extremities.  MSK: 5/5 BUE and BLE. Gait normal. Negative Spurlings sign.         Assessment and Plan:     1. Neck pain  Although the patient has a normal exam today with no evidence of myelopathy, weakness or radicular symptoms, considering the history I did recommend a repeat MRI.  The patient declines.  She does use Ativan for spasm and this seems to help well.  I also offered physical medicine consultation but she declines.  I informed her to let me know if she changes her mind about the MRI, which I think would be helpful.  - LORazepam (ATIVAN) 1 MG Tab; Take 1 Tab by mouth 2 times a day as needed (muscle spasm) for up to 30 days.  Dispense: 60 Tab; Refill: 0    2. Healthcare maintenance  - see HPI.   - discussed diet and exercise.     3. " Dyslipidemia  - diet and exercise.         Followup: Return in about 1 year (around 8/15/2020), or if symptoms worsen or fail to improve, for Wellness Visit, Long.

## 2019-09-24 ENCOUNTER — IMMUNIZATION (OUTPATIENT)
Dept: OCCUPATIONAL MEDICINE | Facility: CLINIC | Age: 54
End: 2019-09-24

## 2019-09-24 DIAGNOSIS — Z23 NEED FOR VACCINATION: ICD-10-CM

## 2019-09-24 PROCEDURE — 90686 IIV4 VACC NO PRSV 0.5 ML IM: CPT | Performed by: PREVENTIVE MEDICINE

## 2019-10-27 ENCOUNTER — OFFICE VISIT (OUTPATIENT)
Dept: URGENT CARE | Facility: CLINIC | Age: 54
End: 2019-10-27
Payer: COMMERCIAL

## 2019-10-27 VITALS
BODY MASS INDEX: 20.89 KG/M2 | WEIGHT: 125.4 LBS | RESPIRATION RATE: 14 BRPM | DIASTOLIC BLOOD PRESSURE: 68 MMHG | HEIGHT: 65 IN | SYSTOLIC BLOOD PRESSURE: 128 MMHG | OXYGEN SATURATION: 100 % | HEART RATE: 83 BPM | TEMPERATURE: 98.3 F

## 2019-10-27 DIAGNOSIS — R19.7 DIARRHEA, UNSPECIFIED TYPE: ICD-10-CM

## 2019-10-27 LAB
APPEARANCE UR: CLEAR
BILIRUB UR STRIP-MCNC: NEGATIVE MG/DL
COLOR UR AUTO: YELLOW
GLUCOSE UR STRIP.AUTO-MCNC: NEGATIVE MG/DL
KETONES UR STRIP.AUTO-MCNC: NEGATIVE MG/DL
LEUKOCYTE ESTERASE UR QL STRIP.AUTO: NEGATIVE
NITRITE UR QL STRIP.AUTO: NEGATIVE
PH UR STRIP.AUTO: 7 [PH] (ref 5–8)
PROT UR QL STRIP: NEGATIVE MG/DL
RBC UR QL AUTO: NORMAL
SP GR UR STRIP.AUTO: 1.01
UROBILINOGEN UR STRIP-MCNC: 0.2 MG/DL

## 2019-10-27 ASSESSMENT — ENCOUNTER SYMPTOMS
ABDOMINAL PAIN: 1
VOMITING: 0
BLOATING: 1
BLOOD IN STOOL: 0
DIARRHEA: 1
FEVER: 0
NAUSEA: 0

## 2019-10-27 NOTE — PATIENT INSTRUCTIONS
Diarrhea, Adult  Diarrhea is frequent loose and watery bowel movements. Diarrhea can make you feel weak and cause you to become dehydrated. Dehydration can make you tired and thirsty, cause you to have a dry mouth, and decrease how often you urinate. Diarrhea typically lasts 2-3 days. However, it can last longer if it is a sign of something more serious. It is important to treat your diarrhea as told by your health care provider.  Follow these instructions at home:  Eating and drinking  Follow these recommendations as told by your health care provider:  · Take an oral rehydration solution (ORS). This is a drink that is sold at pharmacies and retail stores.  · Drink clear fluids, such as water, ice chips, diluted fruit juice, and low-calorie sports drinks.  · Eat bland, easy-to-digest foods in small amounts as you are able. These foods include bananas, applesauce, rice, lean meats, toast, and crackers.  · Avoid drinking fluids that contain a lot of sugar or caffeine, such as energy drinks, sports drinks, and soda.  · Avoid alcohol.  · Avoid spicy or fatty foods.  General instructions  · Drink enough fluid to keep your urine clear or pale yellow.  · Wash your hands often. If soap and water are not available, use hand .  · Make sure that all people in your household wash their hands well and often.  · Take over-the-counter and prescription medicines only as told by your health care provider.  · Rest at home while you recover.  · Watch your condition for any changes.  · Take a warm bath to relieve any burning or pain from frequent diarrhea episodes.  · Keep all follow-up visits as told by your health care provider. This is important.  Contact a health care provider if:  · You have a fever.  · Your diarrhea gets worse.  · You have new symptoms.  · You cannot keep fluids down.  · You feel light-headed or dizzy.  · You have a headache  · You have muscle cramps.  Get help right away if:  · You have chest  pain.  · You feel extremely weak or you faint.  · You have bloody or black stools or stools that look like tar.  · You have severe pain, cramping, or bloating in your abdomen.  · You have trouble breathing or you are breathing very quickly.  · Your heart is beating very quickly.  · Your skin feels cold and clammy.  · You feel confused.  · You have signs of dehydration, such as:  ¨ Dark urine, very little urine, or no urine.  ¨ Cracked lips.  ¨ Dry mouth.  ¨ Sunken eyes.  ¨ Sleepiness.  ¨ Weakness.  This information is not intended to replace advice given to you by your health care provider. Make sure you discuss any questions you have with your health care provider.  Document Released: 12/08/2003 Document Revised: 04/27/2017 Document Reviewed: 08/23/2016  [employer] Interactive Patient Education © 2017 [employer]

## 2019-10-27 NOTE — PROGRESS NOTES
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 Episode onset: over 2 weeks. The problem occurs 5 to 10 times per day (today). The problem has been rapidly worsening. The stool consistency is described as watery. Associated symptoms include abdominal pain and bloating. Pertinent negatives include no fever or vomiting. Nothing aggravates the symptoms. 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 She has tried bismuth subsalicylate for the symptoms. The treatment provided mild relief. There is no history of inflammatory bowel disease or a recent abdominal surgery. 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    Review of Systems   Constitutional: Negative for fever.   Gastrointestinal: Positive for abdominal pain, bloating and diarrhea. Negative for blood in stool, melena, nausea and vomiting.   Genitourinary: Negative for dysuria, frequency and urgency.        Denies pregnancy.       PMH:  has a past medical history of Anesthesia, Arthritis, and High cholesterol.  MEDS:   Current Outpatient Medications:   •  Ibuprofen 200 MG Cap, Take 200 mg by mouth as needed., Disp: , Rfl:   •  Calcium-Magnesium-Vitamin D (CALCIUM MAGNESIUM PO), Take  by mouth every day., Disp: , Rfl:   ALLERGIES:   Allergies   Allergen Reactions   • Penicillins Hives     SURGHX:   Past Surgical History:   Procedure Laterality Date   • BONE BIOPSY Left 2016    Procedure: WRIST BIOPSY WITH POSSIBLE BONE GRAFT ALLO ;  Surgeon: Benjamin Menezes M.D.;  Location: SURGERY [facility];  Service:    • BLEPHAROPLASTY     • CHOLECYSTECTOMY     • PRIMARY C SECTION           SOCHX:  reports that she has never smoked. She has never used smokeless tobacco. She reports that she drinks alcohol. She reports that she does not use drugs.  FH: family history includes  "Arthritis in her brother and mother; Cancer in her brother, paternal aunt, and another family member; Hyperlipidemia in her mother.   Objective:     /68 (BP Location: Left arm, Patient Position: Sitting, BP Cuff Size: Adult)   Pulse 83   Temp 36.8 °C (98.3 °F)   Resp 14   Ht 1.651 m (5' 5\")   Wt 56.9 kg (125 lb 6.4 oz)   SpO2 100%   BMI 20.87 kg/m²      Physical Exam   Constitutional: She is oriented to person, place, and time. She appears well-developed and well-nourished. She is cooperative. She does not have a sickly appearance. She does not appear ill. No distress.   Eyes: Conjunctivae are normal. No scleral icterus.   Neck: Phonation normal. No JVD present.   Cardiovascular: Normal rate, regular rhythm and normal heart sounds.   Pulmonary/Chest: Effort normal and breath sounds normal.   Abdominal: Soft. She exhibits no distension and no mass. Bowel sounds are increased. There is no hepatosplenomegaly. There is tenderness in the right upper quadrant and epigastric area. There is no rigidity, no rebound, no guarding, no CVA tenderness and no tenderness at McBurney's point.   Neurological: She is alert and oriented to person, place, and time.   Skin: Skin is warm and dry.   Psychiatric: She has a normal mood and affect.          Advised ED evaluation if any worsened pain, fever, bleeding.     Assessment/Plan:     1. Diarrhea, unspecified type  Advised clear liquid diet, advancing to full liquids as tolerated.  - C Diff by PCR rflx Toxin; Future  - STOOL CULT+O+P+WBC  Trace lysed blood- POCT Urinalysis    "

## 2019-10-28 ENCOUNTER — HOSPITAL ENCOUNTER (OUTPATIENT)
Facility: MEDICAL CENTER | Age: 54
End: 2019-10-28
Attending: EMERGENCY MEDICINE
Payer: COMMERCIAL

## 2019-10-28 ENCOUNTER — TELEPHONE (OUTPATIENT)
Dept: URGENT CARE | Facility: PHYSICIAN GROUP | Age: 54
End: 2019-10-28

## 2019-10-28 DIAGNOSIS — R19.7 DIARRHEA, UNSPECIFIED TYPE: ICD-10-CM

## 2019-10-28 LAB
C DIFF DNA SPEC QL NAA+PROBE: NEGATIVE
C DIFF TOX GENS STL QL NAA+PROBE: NEGATIVE
WBC STL QL MICRO: NORMAL

## 2019-10-28 PROCEDURE — 89055 LEUKOCYTE ASSESSMENT FECAL: CPT

## 2019-10-28 PROCEDURE — 87045 FECES CULTURE AEROBIC BACT: CPT

## 2019-10-28 PROCEDURE — 87177 OVA AND PARASITES SMEARS: CPT

## 2019-10-28 PROCEDURE — 87209 SMEAR COMPLEX STAIN: CPT

## 2019-10-28 PROCEDURE — 87046 STOOL CULTR AEROBIC BACT EA: CPT

## 2019-10-28 PROCEDURE — 87899 AGENT NOS ASSAY W/OPTIC: CPT

## 2019-10-28 PROCEDURE — 87493 C DIFF AMPLIFIED PROBE: CPT

## 2019-10-29 LAB
E COLI SXT1+2 STL IA: NORMAL
SIGNIFICANT IND 70042: NORMAL
SITE SITE: NORMAL
SOURCE SOURCE: NORMAL

## 2019-10-29 NOTE — TELEPHONE ENCOUNTER
Please notify patient of negative C. Diff. Toxin and negative WBC stool tests; culture and O&P pending.

## 2019-10-29 NOTE — TELEPHONE ENCOUNTER
I called the patient, relayed message as per Dr. Goldman and pending culture and O&P pending. The patient was appreciative of the call.  Kat

## 2019-10-30 ENCOUNTER — TELEPHONE (OUTPATIENT)
Dept: URGENT CARE | Facility: PHYSICIAN GROUP | Age: 54
End: 2019-10-30

## 2019-10-30 NOTE — TELEPHONE ENCOUNTER
Patient called; returned call.  Questions whether to treat diarrhea empirically with azithromycin.  Advised fecal leukocytes negative, C. difficile negative, Shiga toxin  Negative.  Doubt bacterial diarrheal process; ova and parasites pending.  Advised supportive care; follow-up with PCP.

## 2019-10-31 ENCOUNTER — TELEPHONE (OUTPATIENT)
Dept: URGENT CARE | Facility: PHYSICIAN GROUP | Age: 54
End: 2019-10-31

## 2019-10-31 LAB
BACTERIA STL CULT: NORMAL
E COLI SXT1+2 STL IA: NORMAL
O+P STL MICRO: NEGATIVE
O+P STL TRI STN: NEGATIVE
SIGNIFICANT IND 70042: NORMAL
SITE SITE: NORMAL
SOURCE SOURCE: NORMAL

## 2019-11-01 NOTE — TELEPHONE ENCOUNTER
Please notify patient of negative ova and parasites screen, stool culture testing.  Continue plan for F/U PCP.

## 2019-12-10 ENCOUNTER — OFFICE VISIT (OUTPATIENT)
Dept: MEDICAL GROUP | Facility: MEDICAL CENTER | Age: 54
End: 2019-12-10
Payer: COMMERCIAL

## 2019-12-10 VITALS
SYSTOLIC BLOOD PRESSURE: 112 MMHG | HEART RATE: 71 BPM | OXYGEN SATURATION: 99 % | DIASTOLIC BLOOD PRESSURE: 70 MMHG | RESPIRATION RATE: 16 BRPM | TEMPERATURE: 97.9 F | WEIGHT: 122.8 LBS | BODY MASS INDEX: 20.46 KG/M2 | HEIGHT: 65 IN

## 2019-12-10 DIAGNOSIS — N95.1 PERIMENOPAUSE: ICD-10-CM

## 2019-12-10 DIAGNOSIS — R19.7 DIARRHEA, UNSPECIFIED TYPE: ICD-10-CM

## 2019-12-10 PROCEDURE — 99214 OFFICE O/P EST MOD 30 MIN: CPT | Performed by: FAMILY MEDICINE

## 2019-12-11 ENCOUNTER — HOSPITAL ENCOUNTER (OUTPATIENT)
Dept: LAB | Facility: MEDICAL CENTER | Age: 54
End: 2019-12-11
Attending: FAMILY MEDICINE
Payer: COMMERCIAL

## 2019-12-11 DIAGNOSIS — R19.7 DIARRHEA, UNSPECIFIED TYPE: ICD-10-CM

## 2019-12-11 LAB
ALBUMIN SERPL BCP-MCNC: 4.6 G/DL (ref 3.2–4.9)
ALBUMIN/GLOB SERPL: 1.8 G/DL
ALP SERPL-CCNC: 42 U/L (ref 30–99)
ALT SERPL-CCNC: 12 U/L (ref 2–50)
ANION GAP SERPL CALC-SCNC: 10 MMOL/L (ref 0–11.9)
AST SERPL-CCNC: 17 U/L (ref 12–45)
BILIRUB SERPL-MCNC: 0.7 MG/DL (ref 0.1–1.5)
BUN SERPL-MCNC: 12 MG/DL (ref 8–22)
CALCIUM SERPL-MCNC: 9.4 MG/DL (ref 8.5–10.5)
CHLORIDE SERPL-SCNC: 103 MMOL/L (ref 96–112)
CO2 SERPL-SCNC: 28 MMOL/L (ref 20–33)
CREAT SERPL-MCNC: 1.01 MG/DL (ref 0.5–1.4)
CRP SERPL HS-MCNC: <0.02 MG/DL (ref 0–0.75)
ERYTHROCYTE [SEDIMENTATION RATE] IN BLOOD BY WESTERGREN METHOD: <1 MM/HOUR (ref 0–30)
GLOBULIN SER CALC-MCNC: 2.5 G/DL (ref 1.9–3.5)
GLUCOSE SERPL-MCNC: 114 MG/DL (ref 65–99)
LIPASE SERPL-CCNC: 17 U/L (ref 11–82)
POTASSIUM SERPL-SCNC: 3.8 MMOL/L (ref 3.6–5.5)
PROT SERPL-MCNC: 7.1 G/DL (ref 6–8.2)
SODIUM SERPL-SCNC: 141 MMOL/L (ref 135–145)

## 2019-12-11 PROCEDURE — 86140 C-REACTIVE PROTEIN: CPT

## 2019-12-11 PROCEDURE — 85652 RBC SED RATE AUTOMATED: CPT

## 2019-12-11 PROCEDURE — 82784 ASSAY IGA/IGD/IGG/IGM EACH: CPT

## 2019-12-11 PROCEDURE — 83690 ASSAY OF LIPASE: CPT

## 2019-12-11 PROCEDURE — 83516 IMMUNOASSAY NONANTIBODY: CPT

## 2019-12-11 PROCEDURE — 36415 COLL VENOUS BLD VENIPUNCTURE: CPT

## 2019-12-11 PROCEDURE — 80053 COMPREHEN METABOLIC PANEL: CPT

## 2019-12-11 NOTE — ASSESSMENT & PLAN NOTE
Patient states that recently she stopped having periods for about 6 months and then she has had intermittent periods since then.  She has not yet gone through menopause.  She has had night sweats ever since she had her child so this is nothing new to her.  She did have a Pap smear recently which was normal.

## 2019-12-11 NOTE — PROGRESS NOTES
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 The patient is alone.     Diarrhea  The patient presents to me for a new problem.  On 1027 she presented to the urgent care with 2 weeks of intermittent diarrhea.  Several stool studies were performed which were normal.  Of note, the patient had a colonoscopy in 2013 which was reassuring.  She had been traveling prior to the diarrhea.  Since then, she has had intermittent episodes of diarrhea that vary in severity and duration.  She denies blood in the stool but does endorse some cramping.  She denies nausea and vomiting.  Pepto-Bismol seems to help.  She denies abnormal vaginal discharge.  She denies dysuria.  Vegetable seem to bring about her symptoms.  Her symptoms are moderate in severity.    Perimenopause  Patient states that recently she stopped having periods for about 6 months and then she has had intermittent periods since then.  She has not yet gone through menopause.  She has had night sweats ever since she had her child so this is nothing new to her.  She did have a Pap smear recently which was normal.      Current Outpatient Medications on File Prior to Visit   Medication Sig Dispense Refill   • LORazepam (ATIVAN) 1 MG Tab Take 1 Tab by mouth 2 times a day as needed (muscle spasm) for up to 30 days. 60 Tab 0   • Ibuprofen 200 MG Cap Take 200 mg by mouth as needed.     • Calcium-Magnesium-Vitamin D (CALCIUM MAGNESIUM PO) Take  by mouth every day.       No current facility-administered medications on file prior to visit.        Past Medical History:   Diagnosis Date   • Anesthesia     Nausea in recovery   • Arthritis    • High cholesterol        Allergies: Penicillins    Surgical History:  has a past surgical history that includes primary c section (1998); cholecystectomy (1999); blepharoplasty (2008); and bone biopsy (Left,  "2/12/2016).    Family History: family history includes Arthritis in her brother and mother; Cancer in her brother, paternal aunt, and another family member; Hyperlipidemia in her mother.    Social History:  reports that she has never smoked. She has never used smokeless tobacco. She reports current alcohol use. She reports that she does not use drugs.    ROS: see HPI.        Objective:     Vitals:    12/10/19 1619   BP: 112/70   BP Location: Left arm   Patient Position: Sitting   BP Cuff Size: Adult   Pulse: 71   Resp: 16   Temp: 36.6 °C (97.9 °F)   TempSrc: Temporal   SpO2: 99%   Weight: 55.7 kg (122 lb 12.8 oz)   Height: 1.651 m (5' 5\")       Physical Exam:  General: alert in no apparent distress.   Cardio: regular rate and rhythm, no murmurs, rubs or gallops.   Resp: CTAB no w/r/r.   GI: Soft, nontender, nondistended.  No rebound, guarding.        Assessment and Plan:     1. Diarrhea, unspecified type  This is an undiagnosed new problem with an uncertain prognosis, however, it certainly sounds consistent with IBS with diarrhea.  I will obtain a fit test considering a change in bowel habits over the age of 50, however, I am reassured by her 2013 colonoscopy finding.  I will also obtain some serology.  I expect all of this will be normal.  If it is, it supports my suspicion of IBS with diarrhea.  In the interim I also recommended a low FODMAPs diet and provided her with a handout on this.  I also recommended peppermint oil tablets as needed.  I will see her back in 2 months and see how she is doing.  - CELIAC DISEASE AB PANEL; Future  - Comp Metabolic Panel; Future  - LIPASE; Future  - WESTERGREN SED RATE; Future  - CRP QUANTITIVE (NON-CARDIAC); Future  - OCCULT BLOOD FECES IMMUNOASSAY; Future    2. Perimenopause  Patient has not had 12 months of amenorrhea, consequently, she does not satisfy the criteria for menopause.  I informed her this is likely perimenopausal symptoms and we'll keep an eye on it. "         Followup: Return in about 2 months (around 2/10/2020), or if symptoms worsen or fail to improve.

## 2019-12-11 NOTE — ASSESSMENT & PLAN NOTE
The patient presents to me for a new problem.  On 1027 she presented to the urgent care with 2 weeks of intermittent diarrhea.  Several stool studies were performed which were normal.  Of note, the patient had a colonoscopy in 2013 which was reassuring.  She had been traveling prior to the diarrhea.  Since then, she has had intermittent episodes of diarrhea that vary in severity and duration.  She denies blood in the stool but does endorse some cramping.  She denies nausea and vomiting.  Pepto-Bismol seems to help.  She denies abnormal vaginal discharge.  She denies dysuria.  Vegetable seem to bring about her symptoms.  Her symptoms are moderate in severity.

## 2019-12-13 ENCOUNTER — HOSPITAL ENCOUNTER (OUTPATIENT)
Facility: MEDICAL CENTER | Age: 54
End: 2019-12-13
Attending: FAMILY MEDICINE
Payer: COMMERCIAL

## 2019-12-13 LAB
IGA SERPL-MCNC: 114 MG/DL (ref 68–408)
TTG IGA SER IA-ACNC: 0 U/ML (ref 0–3)

## 2019-12-13 PROCEDURE — 82274 ASSAY TEST FOR BLOOD FECAL: CPT

## 2019-12-17 ENCOUNTER — TELEPHONE (OUTPATIENT)
Dept: MEDICAL GROUP | Facility: MEDICAL CENTER | Age: 54
End: 2019-12-17

## 2019-12-17 DIAGNOSIS — R19.7 DIARRHEA, UNSPECIFIED TYPE: ICD-10-CM

## 2019-12-17 NOTE — TELEPHONE ENCOUNTER
Received a call from the patient. I saw her one week ago for IBS-D. She has been doing a low FODMAP diet and peppermint oil with some improvement but limited improvement. She spoke with a GI colleague of hers who recommended rifaximin. I discussed risks/benefits of rifaximin with the patient, including the risk of C. Diff. She would like to try it.

## 2019-12-18 ENCOUNTER — TELEPHONE (OUTPATIENT)
Dept: MEDICAL GROUP | Facility: MEDICAL CENTER | Age: 54
End: 2019-12-18

## 2019-12-18 NOTE — TELEPHONE ENCOUNTER
MEDICATION PRIOR AUTHORIZATION NEEDED:    1. Name of Medication: Xifaxan 550 mg    2. Requested By (Name of Pharmacy): [facility]     3. Is insurance on file current? yes    4. What is the name & phone number of the 3rd party payor? NA    Looks like PAR was Started 12/17/2019

## 2019-12-18 NOTE — TELEPHONE ENCOUNTER
FINAL PRIOR AUTHORIZATION STATUS:    1.  Name of Medication & Dose: Xifaxan      2. Prior Auth Status: Denied.  Reason: Needs diagnosis of IBS-D    3. Action Taken: Pharmacy Notified: yes Patient Notified: N\A

## 2019-12-19 LAB — HEMOCCULT STL QL IA: NEGATIVE

## 2019-12-20 ENCOUNTER — HOSPITAL ENCOUNTER (OUTPATIENT)
Dept: RADIOLOGY | Facility: MEDICAL CENTER | Age: 54
End: 2019-12-20
Attending: ORTHOPAEDIC SURGERY
Payer: COMMERCIAL

## 2019-12-20 ENCOUNTER — HOSPITAL ENCOUNTER (OUTPATIENT)
Dept: RADIOLOGY | Facility: MEDICAL CENTER | Age: 54
End: 2019-12-20
Attending: FAMILY MEDICINE
Payer: COMMERCIAL

## 2019-12-20 DIAGNOSIS — Z12.31 VISIT FOR SCREENING MAMMOGRAM: ICD-10-CM

## 2019-12-20 DIAGNOSIS — M54.50 LOW BACK PAIN, UNSPECIFIED BACK PAIN LATERALITY, UNSPECIFIED CHRONICITY, UNSPECIFIED WHETHER SCIATICA PRESENT: ICD-10-CM

## 2019-12-20 PROCEDURE — 72148 MRI LUMBAR SPINE W/O DYE: CPT

## 2019-12-20 PROCEDURE — 77063 BREAST TOMOSYNTHESIS BI: CPT

## 2020-01-08 NOTE — TELEPHONE ENCOUNTER
FINAL PRIOR AUTHORIZATION STATUS:    1.  Name of Medication & Dose: Xifaxan      2. Prior Auth Status: Approved through 01/18/2020     3. Action Taken: Pharmacy Notified: yes Patient Notified: N\A

## 2020-01-20 ENCOUNTER — TELEPHONE (OUTPATIENT)
Dept: MEDICAL GROUP | Facility: MEDICAL CENTER | Age: 55
End: 2020-01-20

## 2020-01-20 NOTE — TELEPHONE ENCOUNTER
MEDICATION PRIOR AUTHORIZATION NEEDED:    1. Name of Medication: Xifaxan  Unable to process PAR/ will try to re-submit

## 2020-02-11 ENCOUNTER — OFFICE VISIT (OUTPATIENT)
Dept: MEDICAL GROUP | Facility: MEDICAL CENTER | Age: 55
End: 2020-02-11
Payer: COMMERCIAL

## 2020-02-11 VITALS
WEIGHT: 124.4 LBS | TEMPERATURE: 99.2 F | HEART RATE: 71 BPM | SYSTOLIC BLOOD PRESSURE: 122 MMHG | OXYGEN SATURATION: 95 % | BODY MASS INDEX: 19.99 KG/M2 | DIASTOLIC BLOOD PRESSURE: 82 MMHG | HEIGHT: 66 IN | RESPIRATION RATE: 16 BRPM

## 2020-02-11 DIAGNOSIS — N95.1 PERIMENOPAUSE: ICD-10-CM

## 2020-02-11 DIAGNOSIS — M54.50 ACUTE RIGHT-SIDED LOW BACK PAIN WITHOUT SCIATICA: ICD-10-CM

## 2020-02-11 DIAGNOSIS — R19.7 DIARRHEA, UNSPECIFIED TYPE: ICD-10-CM

## 2020-02-11 DIAGNOSIS — F41.9 ANXIETY: ICD-10-CM

## 2020-02-11 DIAGNOSIS — R94.4 DECREASED GFR: ICD-10-CM

## 2020-02-11 PROCEDURE — 99214 OFFICE O/P EST MOD 30 MIN: CPT | Performed by: FAMILY MEDICINE

## 2020-02-11 RX ORDER — LORAZEPAM 1 MG/1
1 TABLET ORAL 2 TIMES DAILY PRN
Qty: 60 TAB | Refills: 0 | Status: SHIPPED | OUTPATIENT
Start: 2020-02-11 | End: 2020-03-12

## 2020-02-11 ASSESSMENT — PATIENT HEALTH QUESTIONNAIRE - PHQ9: CLINICAL INTERPRETATION OF PHQ2 SCORE: 0

## 2020-02-12 NOTE — ASSESSMENT & PLAN NOTE
I saw the patient several weeks ago for diarrhea.  An extensive work-up was performed which was reassuring.  She was diagnosed with IBS with diarrhea and the FODMAPs diet was recommended.  The patient later contacted me asking to be started on rifaximin.  This was prescribed but she never took it.  She just use the low FODMAPs diet and her symptoms resolved.

## 2020-02-12 NOTE — ASSESSMENT & PLAN NOTE
The patient describes a several year history of intermittent, mild to moderate severity anxiety that improves with exercise and occasional Ativan use. She takes Ativan 1 mg as needed, about 3 times per week on average, for both her anxiety and occasional neck pains. There has no associated depression and is requesting a refill.     Controlled Substance Assessment:     - Is the medication, if previously prescribed, working as intended and expected to treat   the patients symptoms: yes.     - Does the patient have a history of substance abuse: no.     - has the patient demonstrated aberrant behavior or intoxication: no.     - Is there reason to believe that the patient is not using medication as prescribed or diverting the medication: no.     - Is there reason to believe that the patient is currently misusing this medication or addicted to the controlled substance: no.     - Is it necessary to verify that controlled substances, other that those authorized under the treatment plan, are not present in the body of this patient: no.    - Are there any blood or urine test that indicate inappropriate use of the medication or other controlled substances : no.     - I have reviewed the . Does the  report irregular/inconsistent medication use or indicate that the medication is being used inappropriately or that the patient is using another controlled substance not prescribed or known to me: no.     - Is there reason to believe that the patient is using other drugs, including alcohol, prescription or illicit drugs, that may interact negatively with controlled substance or have not been prescribed by a practitioner who is treating the patient: no.     - Are there any known early refill attempts or claims that medication has been lost or stolen: no.     - Are there are any major changes in the patient's mental or physical health that would affect the medical appropriateness of this medication: no.     - Has the patient  increased the dose of medication without provider authorization: no.    - Has the patient been reluctant to cooperate with any exam, analysis or test recommended by me: no.     - Has the patient demonstrated reluctance to stop or reduce use of the medicine: no.     - Has the patient requested or demanded a controlled substance that is likely to be abused or cause dependency or addiction: requests Ativan.     - Is there any other evidence that the patient is chronically using opioids, misusing, abusing, illegally using or addicted to any drug or failing to comply with the instructions of the practitioner concerning the use of the controlled substance: no    - Are there any other factors that the practitioner determines is necessary to make an informed professional judgment concerning the medical appropriateness of the prescription: no.

## 2020-02-12 NOTE — PROGRESS NOTES
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 The patient is alone.     Acute right-sided low back pain without sciatica  Patient states that just before Dina she developed an atraumatic right lower back pain.  She used Advil but this did not help.  She contacted her brother who is an orthopedic surgeon.  He prescribed Medrol and this did not help.  He also ordered an MRI which did not reveal any significant findings beyond some mild degenerative change.  The patient has been doing physical therapy and this is helping.  She denies bowel and bladder incontinence or retention.  She denies perineal anesthesia.  There is no sharp, shooting pain down either leg but she does have what she describes as a hamstrings pull on the right.  Her symptoms are currently moderate in severity and getting better.    Perimenopause  The patient's menstrual cycles have normalized.    Diarrhea  I saw the patient several weeks ago for diarrhea.  An extensive work-up was performed which was reassuring.  She was diagnosed with IBS with diarrhea and the FODMAPs diet was recommended.  The patient later contacted me asking to be started on rifaximin.  This was prescribed but she never took it.  She just use the low FODMAPs diet and her symptoms resolved.    Decreased GFR  Noted on past labs.    Anxiety  The patient describes a several year history of intermittent, mild to moderate severity anxiety that improves with exercise and occasional Ativan use. She takes Ativan 1 mg as needed, about 3 times per week on average, for both her anxiety and occasional neck pains. There has no associated depression and is requesting a refill.     Controlled Substance Assessment:     - Is the medication, if previously prescribed, working as intended and expected to treat   the patients symptoms: yes.     - Does the patient have a  history of substance abuse: no.     - has the patient demonstrated aberrant behavior or intoxication: no.     - Is there reason to believe that the patient is not using medication as prescribed or diverting the medication: no.     - Is there reason to believe that the patient is currently misusing this medication or addicted to the controlled substance: no.     - Is it necessary to verify that controlled substances, other that those authorized under the treatment plan, are not present in the body of this patient: no.    - Are there any blood or urine test that indicate inappropriate use of the medication or other controlled substances : no.     - I have reviewed the . Does the  report irregular/inconsistent medication use or indicate that the medication is being used inappropriately or that the patient is using another controlled substance not prescribed or known to me: no.     - Is there reason to believe that the patient is using other drugs, including alcohol, prescription or illicit drugs, that may interact negatively with controlled substance or have not been prescribed by a practitioner who is treating the patient: no.     - Are there any known early refill attempts or claims that medication has been lost or stolen: no.     - Are there are any major changes in the patient's mental or physical health that would affect the medical appropriateness of this medication: no.     - Has the patient increased the dose of medication without provider authorization: no.    - Has the patient been reluctant to cooperate with any exam, analysis or test recommended by me: no.     - Has the patient demonstrated reluctance to stop or reduce use of the medicine: no.     - Has the patient requested or demanded a controlled substance that is likely to be abused or cause dependency or addiction: requests Ativan.     - Is there any other evidence that the patient is chronically using opioids, misusing, abusing, illegally using  "or addicted to any drug or failing to comply with the instructions of the practitioner concerning the use of the controlled substance: no    - Are there any other factors that the practitioner determines is necessary to make an informed professional judgment concerning the medical appropriateness of the prescription: no.         Current Outpatient Medications on File Prior to Visit   Medication Sig Dispense Refill   • Ibuprofen 200 MG Cap Take 200 mg by mouth as needed.     • Calcium-Magnesium-Vitamin D (CALCIUM MAGNESIUM PO) Take  by mouth every day.       No current facility-administered medications on file prior to visit.        Past Medical History:   Diagnosis Date   • Anesthesia     Nausea in recovery   • Arthritis    • High cholesterol        Allergies: Penicillins    Surgical History:  has a past surgical history that includes primary c section (1998); cholecystectomy (1999); blepharoplasty (2008); and bone biopsy (Left, 2/12/2016).    Family History: family history includes Arthritis in her brother and mother; Cancer in her brother, paternal aunt, and another family member; Hyperlipidemia in her mother.    Social History:  reports that she has never smoked. She has never used smokeless tobacco. She reports current alcohol use. She reports that she does not use drugs.    ROS: see HPI.        Objective:     Vitals:    02/11/20 1616   BP: 122/82   BP Location: Left arm   Patient Position: Sitting   BP Cuff Size: Adult   Pulse: 71   Resp: 16   Temp: 37.3 °C (99.2 °F)   TempSrc: Temporal   SpO2: 95%   Weight: 56.4 kg (124 lb 6.4 oz)   Height: 1.664 m (5' 5.5\")       Physical Exam:  General: alert in no apparent distress.   Back: No tenderness palpation of the spine or paraspinal muscles bilaterally.  The patient does identify some discomfort over the right SI joint.  Negative Betzy testing bilaterally.  Negative logroll bilaterally.  Negative straight leg raise bilaterally.  Sensation intact in all dermatomal " distributions of the bilateral lower extremities.  Gait normal.      Assessment and Plan:     1. Acute right-sided low back pain without sciatica  The examination is normal but I suspect that the patient has an SI joint pain producing a paraspinal muscle spasm that is getting better.  She will continue physical therapy and I recommended a salonpas patch.  I also provided her with some SI joint specific exercises.  I asked that she let me know if her condition has not completely resolve within 2 weeks, at which point I would have her see a physiatry us.    2. Anxiety  - LORazepam (ATIVAN) 1 MG Tab; Take 1 Tab by mouth 2 times a day as needed (muscle spasm) for up to 30 days.  Dispense: 60 Tab; Refill: 0    3. Decreased GFR  - Basic Metabolic Panel; Future    4. Diarrhea, unspecified type  - resolved.     5. Perimenopause  - resolved.         Followup: Return in about 6 months (around 8/11/2020), or if symptoms worsen or fail to improve, for Wellness Visit, Long.

## 2020-02-12 NOTE — ASSESSMENT & PLAN NOTE
Patient states that just before Christmas she developed an atraumatic right lower back pain.  She used Advil but this did not help.  She contacted her brother who is an orthopedic surgeon.  He prescribed Medrol and this did not help.  He also ordered an MRI which did not reveal any significant findings beyond some mild degenerative change.  The patient has been doing physical therapy and this is helping.  She denies bowel and bladder incontinence or retention.  She denies perineal anesthesia.  There is no sharp, shooting pain down either leg but she does have what she describes as a hamstrings pull on the right.  Her symptoms are currently moderate in severity and getting better.

## 2020-02-24 ENCOUNTER — HOSPITAL ENCOUNTER (OUTPATIENT)
Dept: LAB | Facility: MEDICAL CENTER | Age: 55
End: 2020-02-24
Attending: FAMILY MEDICINE
Payer: COMMERCIAL

## 2020-02-24 DIAGNOSIS — R94.4 DECREASED GFR: ICD-10-CM

## 2020-02-24 LAB
ANION GAP SERPL CALC-SCNC: 9 MMOL/L (ref 0–11.9)
BUN SERPL-MCNC: 14 MG/DL (ref 8–22)
CALCIUM SERPL-MCNC: 9.2 MG/DL (ref 8.5–10.5)
CHLORIDE SERPL-SCNC: 105 MMOL/L (ref 96–112)
CO2 SERPL-SCNC: 25 MMOL/L (ref 20–33)
CREAT SERPL-MCNC: 0.97 MG/DL (ref 0.5–1.4)
GLUCOSE SERPL-MCNC: 86 MG/DL (ref 65–99)
POTASSIUM SERPL-SCNC: 4 MMOL/L (ref 3.6–5.5)
SODIUM SERPL-SCNC: 139 MMOL/L (ref 135–145)

## 2020-02-24 PROCEDURE — 36415 COLL VENOUS BLD VENIPUNCTURE: CPT

## 2020-02-24 PROCEDURE — 80048 BASIC METABOLIC PNL TOTAL CA: CPT

## 2020-02-25 ENCOUNTER — HOSPITAL ENCOUNTER (OUTPATIENT)
Dept: RADIOLOGY | Facility: MEDICAL CENTER | Age: 55
End: 2020-02-25
Attending: FAMILY MEDICINE
Payer: COMMERCIAL

## 2020-02-25 ENCOUNTER — HOSPITAL ENCOUNTER (OUTPATIENT)
Dept: LAB | Facility: MEDICAL CENTER | Age: 55
End: 2020-02-25
Attending: FAMILY MEDICINE
Payer: COMMERCIAL

## 2020-02-25 DIAGNOSIS — R94.4 DECREASED GFR: ICD-10-CM

## 2020-02-25 LAB
CREAT UR-MCNC: 18.3 MG/DL
MICROALBUMIN UR-MCNC: <0.7 MG/DL
MICROALBUMIN/CREAT UR: NORMAL MG/G (ref 0–30)

## 2020-02-25 PROCEDURE — 82570 ASSAY OF URINE CREATININE: CPT

## 2020-02-25 PROCEDURE — 76775 US EXAM ABDO BACK WALL LIM: CPT

## 2020-02-25 PROCEDURE — 82043 UR ALBUMIN QUANTITATIVE: CPT

## 2020-03-02 ENCOUNTER — OFFICE VISIT (OUTPATIENT)
Dept: MEDICAL GROUP | Facility: MEDICAL CENTER | Age: 55
End: 2020-03-02
Payer: COMMERCIAL

## 2020-03-02 ENCOUNTER — HOSPITAL ENCOUNTER (OUTPATIENT)
Dept: LAB | Facility: MEDICAL CENTER | Age: 55
End: 2020-03-02
Attending: FAMILY MEDICINE
Payer: COMMERCIAL

## 2020-03-02 VITALS
OXYGEN SATURATION: 98 % | WEIGHT: 125.6 LBS | SYSTOLIC BLOOD PRESSURE: 112 MMHG | HEART RATE: 66 BPM | BODY MASS INDEX: 20.18 KG/M2 | DIASTOLIC BLOOD PRESSURE: 62 MMHG | TEMPERATURE: 98.9 F | RESPIRATION RATE: 18 BRPM | HEIGHT: 66 IN

## 2020-03-02 DIAGNOSIS — L57.0 ACTINIC KERATOSES: ICD-10-CM

## 2020-03-02 DIAGNOSIS — R94.4 DECREASED GFR: ICD-10-CM

## 2020-03-02 DIAGNOSIS — L82.1 SEBORRHEIC KERATOSES: ICD-10-CM

## 2020-03-02 DIAGNOSIS — D48.5 NEOPLASM OF UNCERTAIN BEHAVIOR OF SKIN: ICD-10-CM

## 2020-03-02 DIAGNOSIS — L91.8 SKIN TAG: ICD-10-CM

## 2020-03-02 LAB — PATHOLOGY CONSULT NOTE: NORMAL

## 2020-03-02 PROCEDURE — 17000 DESTRUCT PREMALG LESION: CPT | Mod: 59 | Performed by: FAMILY MEDICINE

## 2020-03-02 PROCEDURE — 99000 SPECIMEN HANDLING OFFICE-LAB: CPT | Performed by: FAMILY MEDICINE

## 2020-03-02 PROCEDURE — 17110 DESTRUCTION B9 LES UP TO 14: CPT | Performed by: FAMILY MEDICINE

## 2020-03-02 PROCEDURE — 17003 DESTRUCT PREMALG LES 2-14: CPT | Mod: 59 | Performed by: FAMILY MEDICINE

## 2020-03-02 PROCEDURE — 99213 OFFICE O/P EST LOW 20 MIN: CPT | Mod: 25 | Performed by: FAMILY MEDICINE

## 2020-03-02 PROCEDURE — 11200 RMVL SKIN TAGS UP TO&INC 15: CPT | Mod: 59 | Performed by: FAMILY MEDICINE

## 2020-03-02 PROCEDURE — 11102 TANGNTL BX SKIN SINGLE LES: CPT | Mod: 59 | Performed by: FAMILY MEDICINE

## 2020-03-02 PROCEDURE — 88305 TISSUE EXAM BY PATHOLOGIST: CPT

## 2020-03-03 NOTE — ASSESSMENT & PLAN NOTE
The patient's daughter recently had a number of concerning skin lesion so the patient presents for a skin check on her torso.

## 2020-03-03 NOTE — PROGRESS NOTES
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 The patient is alone.     Seborrheic keratoses  On the right chest the patient points out a skin lesion which is bothersome to her.  She tends to pick at it and it can be itchy.    Decreased GFR  Noted on past labs.  The patient has been using a fair amount of ibuprofen and follow-up labs continue to demonstrate a slightly decreased GFR, though it improved slightly.  Her kidney ultrasound and urine for microalbumin testing was normal.    Actinic keratoses  The patient's daughter recently had a number of concerning skin lesion so the patient presents for a skin check on her torso.    Skin tag  On the R posterior shoulder is a skin tag that tends to catch on clothing and can be itchy and uncomfortable for the patient.     Neoplasm of uncertain behavior of skin  There is an inflamed and irritated skin lesion on the midline back near the bra-strap that is bothersome and irritating to the patient. She'd like it removed.       Current Outpatient Medications on File Prior to Visit   Medication Sig Dispense Refill   • LORazepam (ATIVAN) 1 MG Tab Take 1 Tab by mouth 2 times a day as needed (muscle spasm) for up to 30 days. 60 Tab 0   • Calcium-Magnesium-Vitamin D (CALCIUM MAGNESIUM PO) Take  by mouth every day.     • Ibuprofen 200 MG Cap Take 200 mg by mouth as needed.       No current facility-administered medications on file prior to visit.        Past Medical History:   Diagnosis Date   • Anesthesia     Nausea in recovery   • Arthritis    • High cholesterol        Allergies: Penicillins    Surgical History:  has a past surgical history that includes primary c section (1998); cholecystectomy (1999); blepharoplasty (2008); and bone biopsy (Left, 2/12/2016).    Family History: family history includes Arthritis in her brother and mother; Cancer in her  "brother, paternal aunt, and another family member; Hyperlipidemia in her mother.    Social History:  reports that she has never smoked. She has never used smokeless tobacco. She reports current alcohol use. She reports that she does not use drugs.    ROS: no fever.        Objective:     Vitals:    03/02/20 1622   BP: 112/62   BP Location: Left arm   Patient Position: Sitting   BP Cuff Size: Adult   Pulse: 66   Resp: 18   Temp: 37.2 °C (98.9 °F)   TempSrc: Temporal   SpO2: 98%   Weight: 57 kg (125 lb 9.6 oz)   Height: 1.664 m (5' 5.5\")       Physical Exam:  General: alert in no apparent distress.   Skin: on the R chest there is a pigmented skin lesion with a \"stuck on appearance\" c/w SK. On the R posterior shoulder there is a skin tag. On the R back there are 5 flaky skin tags on an erythematous base c/w AK's. In the midline back near the bra-strap there is a 0.6 cm reddish skin lesion that is irritated.     Cryotherapy Procedure Note:  I discussed the risks and benefits of cryotherapy with the patient who gave verbal consent for the procedure. Three applications of cryotherapy were applied to the SK, 6 AK's and skin tag described above. The patient tolerated the procedure well and there were no complications. Aftercare was discussed.    Shave Biopsy Procedure Note:  Informed consent was obtained. The area was cleansed with betadine and 1% lidocaine without epinephrine was used for local anesthesia. A shave biopsy was taken of the irritated reddish midline skin lesion described above. Minimal bleeding was encountered and hemostasis was achieved with electrocautery. There were no complications. The area was dressed with bacitracin and a Band-Aid. Aftercare was discussed with the patient.      Assessment and Plan:     1. Neoplasm of uncertain behavior of skin  Midline back, indication for biopsy is irritating to the patient.     2. Seborrheic keratoses  - cryotherapy (see procedure note above).     3. Decreased GFR  - " encouraged hydration, emphasized need to avoid all NSAID's. Repeat BMP in f/u.     4. Actinic keratoses  - cryotherapy (see procedure note above).     5. Skin tag  - cryotherapy (see procedure note above).         Followup: Return if symptoms worsen or fail to improve.

## 2020-03-03 NOTE — ASSESSMENT & PLAN NOTE
On the R posterior shoulder is a skin tag that tends to catch on clothing and can be itchy and uncomfortable for the patient.

## 2020-03-03 NOTE — ASSESSMENT & PLAN NOTE
On the right chest the patient points out a skin lesion which is bothersome to her.  She tends to pick at it and it can be itchy.

## 2020-03-03 NOTE — ASSESSMENT & PLAN NOTE
Noted on past labs.  The patient has been using a fair amount of ibuprofen and follow-up labs continue to demonstrate a slightly decreased GFR, though it improved slightly.  Her kidney ultrasound and urine for microalbumin testing was normal.

## 2020-03-03 NOTE — ASSESSMENT & PLAN NOTE
There is an inflamed and irritated skin lesion on the midline back near the bra-strap that is bothersome and irritating to the patient. She'd like it removed.

## 2020-04-03 ENCOUNTER — EH NON-PROVIDER (OUTPATIENT)
Dept: OCCUPATIONAL MEDICINE | Facility: CLINIC | Age: 55
End: 2020-04-03

## 2020-04-03 DIAGNOSIS — Z02.89 ENCOUNTER FOR OCCUPATIONAL HEALTH EXAMINATION INVOLVING RESPIRATOR: Primary | ICD-10-CM

## 2020-04-03 PROCEDURE — 94375 RESPIRATORY FLOW VOLUME LOOP: CPT | Performed by: NURSE PRACTITIONER

## 2020-07-24 ENCOUNTER — HOSPITAL ENCOUNTER (OUTPATIENT)
Facility: MEDICAL CENTER | Age: 55
End: 2020-07-24
Attending: FAMILY MEDICINE
Payer: COMMERCIAL

## 2020-07-24 ENCOUNTER — HOSPITAL ENCOUNTER (OUTPATIENT)
Dept: LAB | Facility: MEDICAL CENTER | Age: 55
End: 2020-07-24
Payer: COMMERCIAL

## 2020-07-24 LAB
BDY FAT % MEASURED: 26.7 %
BP DIAS: 40 MMHG
BP SYS: 110 MMHG
CHOLEST SERPL-MCNC: 224 MG/DL (ref 100–199)
DIABETES HTDIA: NO
EVENT NAME HTEVT: NORMAL
FASTING HTFAS: YES
GLUCOSE SERPL-MCNC: 94 MG/DL (ref 65–99)
HDLC SERPL-MCNC: 85 MG/DL
HYPERTENSION HTHYP: NO
LDLC SERPL CALC-MCNC: 125 MG/DL
SCREENING LOC CITY HTCIT: NORMAL
SCREENING LOC STATE HTSTA: NORMAL
SCREENING LOCATION HTLOC: NORMAL
SMOKING HTSMO: NO
SUBSCRIBER ID HTSID: NORMAL
TRIGL SERPL-MCNC: 70 MG/DL (ref 0–149)

## 2020-07-24 PROCEDURE — S5190 WELLNESS ASSESSMENT BY NONPH: HCPCS

## 2020-07-24 PROCEDURE — 82565 ASSAY OF CREATININE: CPT

## 2020-07-24 PROCEDURE — 36415 COLL VENOUS BLD VENIPUNCTURE: CPT

## 2020-07-24 PROCEDURE — 82947 ASSAY GLUCOSE BLOOD QUANT: CPT

## 2020-07-24 PROCEDURE — 80061 LIPID PANEL: CPT

## 2020-07-27 DIAGNOSIS — E78.5 DYSLIPIDEMIA: ICD-10-CM

## 2020-07-27 LAB — CREAT SERPL-MCNC: 0.82 MG/DL (ref 0.5–1.4)

## 2020-07-27 NOTE — PROGRESS NOTES
Patient recently had labs drawn. Please see if they can add on creatinine to this. I placed the order.

## 2020-07-27 NOTE — PROGRESS NOTES
Called the lab and they'll be able add the test to the existing sample however they need for you to add the lab order in Epic.  Dr. Cunha, please add the order if ok

## 2020-08-02 SDOH — HEALTH STABILITY: PHYSICAL HEALTH: ON AVERAGE, HOW MANY DAYS PER WEEK DO YOU ENGAGE IN MODERATE TO STRENUOUS EXERCISE (LIKE A BRISK WALK)?: 4 DAYS

## 2020-08-02 SDOH — HEALTH STABILITY: MENTAL HEALTH
STRESS IS WHEN SOMEONE FEELS TENSE, NERVOUS, ANXIOUS, OR CAN'T SLEEP AT NIGHT BECAUSE THEIR MIND IS TROUBLED. HOW STRESSED ARE YOU?: NOT AT ALL

## 2020-08-02 SDOH — HEALTH STABILITY: PHYSICAL HEALTH: ON AVERAGE, HOW MANY MINUTES DO YOU ENGAGE IN EXERCISE AT THIS LEVEL?: 50 MINUTES

## 2020-08-02 SDOH — ECONOMIC STABILITY: INCOME INSECURITY: IN THE LAST 12 MONTHS, WAS THERE A TIME WHEN YOU WERE NOT ABLE TO PAY THE MORTGAGE OR RENT ON TIME?: NO

## 2020-08-02 SDOH — ECONOMIC STABILITY: TRANSPORTATION INSECURITY
IN THE PAST 12 MONTHS, HAS THE LACK OF TRANSPORTATION KEPT YOU FROM MEDICAL APPOINTMENTS OR FROM GETTING MEDICATIONS?: NO

## 2020-08-02 SDOH — ECONOMIC STABILITY: HOUSING INSECURITY
IN THE LAST 12 MONTHS, WAS THERE A TIME WHEN YOU DID NOT HAVE A STEADY PLACE TO SLEEP OR SLEPT IN A SHELTER (INCLUDING NOW)?: NO

## 2020-08-02 SDOH — ECONOMIC STABILITY: HOUSING INSECURITY: IN THE LAST 12 MONTHS, HOW MANY PLACES HAVE YOU LIVED?: 1

## 2020-08-02 SDOH — ECONOMIC STABILITY: TRANSPORTATION INSECURITY
IN THE PAST 12 MONTHS, HAS LACK OF RELIABLE TRANSPORTATION KEPT YOU FROM MEDICAL APPOINTMENTS, MEETINGS, WORK OR FROM GETTING THINGS NEEDED FOR DAILY LIVING?: NO

## 2020-08-02 ASSESSMENT — SOCIAL DETERMINANTS OF HEALTH (SDOH)
WITHIN THE PAST 12 MONTHS, THE FOOD YOU BOUGHT JUST DIDN'T LAST AND YOU DIDN'T HAVE MONEY TO GET MORE: NEVER TRUE
HOW OFTEN DO YOU GET TOGETHER WITH FRIENDS OR RELATIVES?: THREE TIMES A WEEK
IN A TYPICAL WEEK, HOW MANY TIMES DO YOU TALK ON THE PHONE WITH FAMILY, FRIENDS, OR NEIGHBORS?: MORE THAN THREE TIMES A WEEK
WITHIN THE PAST 12 MONTHS, YOU WORRIED THAT YOUR FOOD WOULD RUN OUT BEFORE YOU GOT THE MONEY TO BUY MORE: NEVER TRUE
HOW OFTEN DO YOU ATTENT MEETINGS OF THE CLUB OR ORGANIZATION YOU BELONG TO?: NEVER
HOW OFTEN DO YOU HAVE A DRINK CONTAINING ALCOHOL: 4 OR MORE TIMES A WEEK
HOW MANY DRINKS CONTAINING ALCOHOL DO YOU HAVE ON A TYPICAL DAY WHEN YOU ARE DRINKING: 1 OR 2
HOW OFTEN DO YOU HAVE SIX OR MORE DRINKS ON ONE OCCASION: NEVER
HOW OFTEN DO YOU ATTEND CHURCH OR RELIGIOUS SERVICES?: NEVER
DO YOU BELONG TO ANY CLUBS OR ORGANIZATIONS SUCH AS CHURCH GROUPS UNIONS, FRATERNAL OR ATHLETIC GROUPS, OR SCHOOL GROUPS?: NO
HOW HARD IS IT FOR YOU TO PAY FOR THE VERY BASICS LIKE FOOD, HOUSING, MEDICAL CARE, AND HEATING?: NOT HARD AT ALL

## 2020-08-03 ENCOUNTER — OFFICE VISIT (OUTPATIENT)
Dept: MEDICAL GROUP | Facility: MEDICAL CENTER | Age: 55
End: 2020-08-03
Payer: COMMERCIAL

## 2020-08-03 ENCOUNTER — HOSPITAL ENCOUNTER (OUTPATIENT)
Dept: LAB | Facility: MEDICAL CENTER | Age: 55
End: 2020-08-03
Attending: FAMILY MEDICINE
Payer: COMMERCIAL

## 2020-08-03 VITALS
HEIGHT: 65 IN | OXYGEN SATURATION: 98 % | HEART RATE: 64 BPM | RESPIRATION RATE: 16 BRPM | BODY MASS INDEX: 20.12 KG/M2 | TEMPERATURE: 98.6 F | DIASTOLIC BLOOD PRESSURE: 60 MMHG | SYSTOLIC BLOOD PRESSURE: 106 MMHG | WEIGHT: 120.8 LBS

## 2020-08-03 DIAGNOSIS — C44.91 BASAL CELL CARCINOMA (BCC), UNSPECIFIED SITE: ICD-10-CM

## 2020-08-03 DIAGNOSIS — R94.4 DECREASED GFR: ICD-10-CM

## 2020-08-03 DIAGNOSIS — D48.5 NEOPLASM OF UNCERTAIN BEHAVIOR OF SKIN: ICD-10-CM

## 2020-08-03 DIAGNOSIS — Z00.00 HEALTHCARE MAINTENANCE: ICD-10-CM

## 2020-08-03 LAB — PATHOLOGY CONSULT NOTE: NORMAL

## 2020-08-03 PROCEDURE — 99396 PREV VISIT EST AGE 40-64: CPT | Mod: 25 | Performed by: FAMILY MEDICINE

## 2020-08-03 PROCEDURE — 11103 TANGNTL BX SKIN EA SEP/ADDL: CPT | Performed by: FAMILY MEDICINE

## 2020-08-03 PROCEDURE — 88305 TISSUE EXAM BY PATHOLOGIST: CPT

## 2020-08-03 PROCEDURE — 99000 SPECIMEN HANDLING OFFICE-LAB: CPT | Performed by: FAMILY MEDICINE

## 2020-08-03 PROCEDURE — 11102 TANGNTL BX SKIN SINGLE LES: CPT | Performed by: FAMILY MEDICINE

## 2020-08-04 NOTE — ASSESSMENT & PLAN NOTE
Patient has a history of right upper arm basal cell carcinoma status post removal.  This has recurred and she will be following with a dermatologist in the near future for definitive removal.

## 2020-08-04 NOTE — ASSESSMENT & PLAN NOTE
Patient has a history of skin cancer.  She presents today with a number of skin lesions that she is concerned about.  These are primarily located in the left axillary region.  She specifically identifies for pigmented skin lesions.  There is also a reddish skin lesion in the right axilla that she would like me to evaluate.

## 2020-08-04 NOTE — ASSESSMENT & PLAN NOTE
Lipids: done 2019.  Fasting Glucose: done 2020 with 10 year ASCVD risk 1.3%.   Hepatitis C Screen: done 2019 and normal.   Colonoscopy: done 9/2013 with 10 year recall.   Mammogram: completed.  Pap: done 10/2018 with cotesting and normal.     Tdap: given 2014.

## 2020-09-03 ENCOUNTER — HOSPITAL ENCOUNTER (OUTPATIENT)
Facility: MEDICAL CENTER | Age: 55
End: 2020-09-03
Attending: FAMILY MEDICINE
Payer: COMMERCIAL

## 2020-09-03 ENCOUNTER — OFFICE VISIT (OUTPATIENT)
Dept: MEDICAL GROUP | Facility: MEDICAL CENTER | Age: 55
End: 2020-09-03
Payer: COMMERCIAL

## 2020-09-03 VITALS
SYSTOLIC BLOOD PRESSURE: 112 MMHG | HEART RATE: 79 BPM | BODY MASS INDEX: 20.56 KG/M2 | OXYGEN SATURATION: 98 % | RESPIRATION RATE: 16 BRPM | TEMPERATURE: 98.1 F | HEIGHT: 65 IN | DIASTOLIC BLOOD PRESSURE: 60 MMHG | WEIGHT: 123.4 LBS

## 2020-09-03 DIAGNOSIS — L91.8 ACROCHORDON: ICD-10-CM

## 2020-09-03 DIAGNOSIS — L57.0 ACTINIC KERATOSES: ICD-10-CM

## 2020-09-03 DIAGNOSIS — L98.9 SKIN LESION: ICD-10-CM

## 2020-09-03 DIAGNOSIS — D48.5 NEOPLASM OF UNCERTAIN BEHAVIOR OF SKIN: ICD-10-CM

## 2020-09-03 LAB — PATHOLOGY CONSULT NOTE: NORMAL

## 2020-09-03 PROCEDURE — 11200 RMVL SKIN TAGS UP TO&INC 15: CPT | Mod: XS | Performed by: FAMILY MEDICINE

## 2020-09-03 PROCEDURE — 88305 TISSUE EXAM BY PATHOLOGIST: CPT

## 2020-09-03 PROCEDURE — 99000 SPECIMEN HANDLING OFFICE-LAB: CPT | Performed by: FAMILY MEDICINE

## 2020-09-03 PROCEDURE — 88305 TISSUE EXAM BY PATHOLOGIST: CPT | Mod: 59

## 2020-09-03 PROCEDURE — 17000 DESTRUCT PREMALG LESION: CPT | Mod: XS | Performed by: FAMILY MEDICINE

## 2020-09-03 PROCEDURE — 11102 TANGNTL BX SKIN SINGLE LES: CPT | Performed by: FAMILY MEDICINE

## 2020-09-03 PROCEDURE — 11103 TANGNTL BX SKIN EA SEP/ADDL: CPT | Performed by: FAMILY MEDICINE

## 2020-09-04 NOTE — ASSESSMENT & PLAN NOTE
Patient describes a skin tag just left of the umbilicus which is bothersome to her because it tends to catch on her waistband and can become irritated, itchy and uncomfortable.  She would like it removed.

## 2020-09-04 NOTE — PROGRESS NOTES
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 The patient is alone.     Acrochordon  Patient describes a skin tag just left of the umbilicus which is bothersome to her because it tends to catch on her waistband and can become irritated, itchy and uncomfortable.  She would like it removed.    Actinic keratoses  On the right lateral leg the patient identifies a flaky, reddish skin lesion that she would like me to evaluate.  She has a history of basal cell carcinoma.    Neoplasm of uncertain behavior of skin  Patient returns today for a number of additional skin lesions in her left axillary region that are pigmented.  She has a personal and family history of skin cancer and would like them biopsied.  They can be quite bothersome because they brushed against the skin as she moves her arm.  At the last visit I removed 4 skin lesions, all of which were melanocytic nevi but, fortunately, there was no evidence of atypia.      Current Outpatient Medications on File Prior to Visit   Medication Sig Dispense Refill   • Calcium-Magnesium-Vitamin D (CALCIUM MAGNESIUM PO) Take  by mouth every day.       No current facility-administered medications on file prior to visit.        Past Medical History:   Diagnosis Date   • Anesthesia     Nausea in recovery   • Arthritis    • High cholesterol        Allergies: Penicillins    Surgical History:  has a past surgical history that includes primary c section (1998); cholecystectomy (1999); blepharoplasty (2008); and bone biopsy (Left, 2/12/2016).    Family History: family history includes Arthritis in her brother and mother; Cancer in her brother, paternal aunt, and another family member; Hyperlipidemia in her mother.    Social History:  reports that she has never smoked. She has never used smokeless tobacco. She reports current alcohol use. She reports that she does  "not use drugs.    ROS: no fever or cough.        Objective:     Vitals:    09/03/20 1430   BP: 112/60   BP Location: Left arm   Patient Position: Sitting   BP Cuff Size: Adult long   Pulse: 79   Resp: 16   Temp: 36.7 °C (98.1 °F)   TempSrc: Temporal   SpO2: 98%   Weight: 56 kg (123 lb 6.4 oz)   Height: 1.651 m (5' 5\")       Physical Exam:  General: alert in no apparent distress.   Skin: Just left of the umbilicus is a small skin tag that appears irritated.  On the right lateral lower leg is a reddish skin lesion with a flaky appearance.  The left axillary region there are 3 pigmented skin lesions that measure approximately 0.2 cm in size, one located medially, one located superiorly and one located inferiorly.    Cryotherapy Procedure Note:  I discussed the risks and benefits of cryotherapy with the patient who gave verbal consent for the procedure. Three applications of cryotherapy were applied to the R leg AK and umbilical skin tag described above. The patient tolerated the procedure well and there were no complications. Aftercare was discussed.    Shave Biopsy Procedure Note:  Informed consent was obtained. The axillary area was cleansed with betadine and 1% lidocaine with epinephrine was used for local anesthesia. A shave biopsy was taken of the three pigmented skin lesion described above. Minimal bleeding was encountered and hemostasis was achieved with electrocautery. There were no complications. The area was dressed with bacitracin and a Band-Aid. Aftercare was discussed with the patient.          Assessment and Plan:     1. Acrochordon  - cryotherapy (see procedure note above).     2. Neoplasm of uncertain behavior of skin  - shave biopsies of all 3 skin lesions performed, see procedure note above.   - Pathology Specimen; Future  - Pathology Specimen; Future  - Pathology Specimen; Future    3. Actinic keratoses  - cryotherapy (see procedure note above).             Followup: Return in about 1 year (around " 9/3/2021), or if symptoms worsen or fail to improve, for Wellness Visit, Long.

## 2020-09-04 NOTE — ASSESSMENT & PLAN NOTE
Patient returns today for a number of additional skin lesions in her left axillary region that are pigmented.  She has a personal and family history of skin cancer and would like them biopsied.  They can be quite bothersome because they brushed against the skin as she moves her arm.  At the last visit I removed 4 skin lesions, all of which were melanocytic nevi but, fortunately, there was no evidence of atypia.

## 2020-09-04 NOTE — ASSESSMENT & PLAN NOTE
On the right lateral leg the patient identifies a flaky, reddish skin lesion that she would like me to evaluate.  She has a history of basal cell carcinoma.

## 2020-09-08 ENCOUNTER — HOSPITAL ENCOUNTER (OUTPATIENT)
Facility: MEDICAL CENTER | Age: 55
End: 2020-09-08
Attending: FAMILY MEDICINE
Payer: COMMERCIAL

## 2020-09-08 LAB — PATHOLOGY CONSULT NOTE: NORMAL

## 2020-09-08 PROCEDURE — 88305 TISSUE EXAM BY PATHOLOGIST: CPT | Mod: 59

## 2020-09-14 ENCOUNTER — TELEPHONE (OUTPATIENT)
Dept: MEDICAL GROUP | Facility: MEDICAL CENTER | Age: 55
End: 2020-09-14

## 2020-09-14 NOTE — TELEPHONE ENCOUNTER
Called the lab and they cancelled the test, they recommend re collection due to missing patient name on the vial. I faxed the form they asked that you sign identifying patient, to the lab but they still decided to cancel the test.  Dr. Cunha, please note....

## 2020-09-14 NOTE — TELEPHONE ENCOUNTER
Back on 9/3 I performed 3 shave biopsies on the patient's left axillary region. The lab sent back 2 of these samples for re-labelling. These 2 samples were re-labelled and the pathology specimens have been read. I am still waiting on the pathology report for the first sample. Could someone call the lab and follow up on this.

## 2020-09-16 NOTE — TELEPHONE ENCOUNTER
Called pathology 9/15. I explained to Mckenzie that 3 shave biopsy samples were sent to them but that only 2 were read. She will relay this to histology who will try to track down the 3rd sample. My personal number was provided to them so they can call me back.

## 2020-09-21 ENCOUNTER — IMMUNIZATION (OUTPATIENT)
Dept: OCCUPATIONAL MEDICINE | Facility: CLINIC | Age: 55
End: 2020-09-21

## 2020-09-21 DIAGNOSIS — Z23 NEED FOR VACCINATION: ICD-10-CM

## 2020-09-21 PROCEDURE — 90686 IIV4 VACC NO PRSV 0.5 ML IM: CPT | Performed by: PREVENTIVE MEDICINE

## 2020-12-07 ENCOUNTER — TELEMEDICINE (OUTPATIENT)
Dept: MEDICAL GROUP | Facility: MEDICAL CENTER | Age: 55
End: 2020-12-07
Payer: COMMERCIAL

## 2020-12-07 VITALS — HEART RATE: 70 BPM | HEIGHT: 65 IN | WEIGHT: 121 LBS | BODY MASS INDEX: 20.16 KG/M2

## 2020-12-07 DIAGNOSIS — M54.2 CERVICALGIA: ICD-10-CM

## 2020-12-07 DIAGNOSIS — M54.2 NECK PAIN: ICD-10-CM

## 2020-12-07 DIAGNOSIS — F41.9 ANXIETY: ICD-10-CM

## 2020-12-07 DIAGNOSIS — R94.4 DECREASED GFR: ICD-10-CM

## 2020-12-07 PROCEDURE — 99214 OFFICE O/P EST MOD 30 MIN: CPT | Mod: 95,CR | Performed by: FAMILY MEDICINE

## 2020-12-07 RX ORDER — LORAZEPAM 1 MG/1
1 TABLET ORAL
Qty: 45 TAB | Refills: 0 | Status: SHIPPED | OUTPATIENT
Start: 2020-12-07 | End: 2021-11-11 | Stop reason: SDUPTHER

## 2020-12-07 NOTE — ASSESSMENT & PLAN NOTE
The patient has described a several year history of intermittent, mild to moderate severity anxiety that improves with exercise and occasional Ativan use. She takes Ativan 1 mg as needed for both her anxiety and occasional neck pains. She uses this very rarely.

## 2020-12-07 NOTE — ASSESSMENT & PLAN NOTE
Patient describes a longstanding history of neck pain.  She states that she had an MRI over 10 years ago which showed diffuse arthritic changes.  She will occasionally get an associated trapezius spasm which causes a tension headache and she is requesting an Ativan refill.  At the last visit she denied paresthesias or weakness.  She continues to deny weakness and denies myelopathic changes or gait disturbance but states that every morning when she wakes up she will get numbness and tingling in the first 3 digits of the bilateral hands.  She denies wrist pain.  This will go away and she is currently asymptomatic.  She states she is able to feel the radial pulse on both hands.

## 2020-12-07 NOTE — PROGRESS NOTES
Telemedicine Visit: Established Patient     This encounter was conducted via Zoom.   Verbal consent was obtained. Patient's identity was verified.    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    Decreased GFR  Improved.     Neck pain  Patient describes a longstanding history of neck pain.  She states that she had an MRI over 10 years ago which showed diffuse arthritic changes.  She will occasionally get an associated trapezius spasm which causes a tension headache and she is requesting an Ativan refill.  At the last visit she denied paresthesias or weakness.  She continues to deny weakness and denies myelopathic changes or gait disturbance but states that every morning when she wakes up she will get numbness and tingling in the first 3 digits of the bilateral hands.  She denies wrist pain.  This will go away and she is currently asymptomatic.  She states she is able to feel the radial pulse on both hands.    Anxiety  The patient has described a several year history of intermittent, mild to moderate severity anxiety that improves with exercise and occasional Ativan use. She takes Ativan 1 mg as needed for both her anxiety and occasional neck pains. She uses this very rarely.    Controlled Substance Assessment:   - Is the medication, if previously prescribed, working as intended and expected to treat   the patients symptoms: yes.   - Does the patient have a history of substance abuse: no.   - Has the patient demonstrated aberrant behavior or intoxication: no.   - Is there reason to believe that the patient is not using medication as prescribed or diverting the medication: no.   - Is there reason to believe that the patient is currently misusing this medication or addicted to the controlled substance: no.   - Is it necessary to verify that controlled substances, other that those authorized under the treatment plan, are not present in the body  of this patient: no.  - Are there any blood or urine test that indicate inappropriate use of the medication or other controlled substances : no.   - I have reviewed the . Does the  report irregular/inconsistent medication use or indicate that the medication is being used inappropriately or that the patient is using another controlled substance not prescribed or known to me: no.   - Has the patient been issues another CS for the same medication from another provider for ongoing treatment:no.   - Is there reason to believe that the patient is using other drugs, including alcohol, prescription or illicit drugs, that may interact negatively with controlled substance or have not been prescribed by a practitioner who is treating the patient: no.   - Are there any known early refill attempts or claims that medication has been lost or stolen: no.   - Are there are any major changes in the patient's mental or physical health that would affect the medical appropriateness of this medication: no.   - Has the patient increased the dose of medication without provider authorization: no.  - Has the patient been reluctant to cooperate with any exam, analysis or test recommended by me: no.   - Has the patient demonstrated reluctance to stop or reduce use of the medicine: no.  - Has the patient requested or demanded a controlled substance that is likely to be abused or cause dependency or addiction: requests Ativan.   - Is there any other evidence that the patient is chronically using opioids, misusing, abusing, illegally using or addicted to any drug or failing to comply with the instructions of the practitioner concerning the use of the controlled substance: no  - Are there any other factors that the practitioner determines is necessary to make an informed professional judgment concerning the medical appropriateness of the prescription: no.     ROS no fever or cough    Allergies   Allergen Reactions   • Penicillins Hives  "      Current medicines (including changes today)  Current Outpatient Medications   Medication Sig Dispense Refill   • LORazepam (ATIVAN) 1 MG Tab Take 1 Tab by mouth 1 time a day as needed (spasm) for up to 45 days. 45 Tab 0   • Calcium-Magnesium-Vitamin D (CALCIUM MAGNESIUM PO) Take  by mouth every day.       No current facility-administered medications for this visit.        Patient Active Problem List    Diagnosis Date Noted   • Basal cell carcinoma 08/03/2020   • Actinic keratoses 03/02/2020   • Acrochordon 03/02/2020   • Neoplasm of uncertain behavior of skin 03/02/2020   • Acute right-sided low back pain without sciatica 02/11/2020   • Diarrhea 12/10/2019   • Perimenopause 12/10/2019   • Healthcare maintenance 08/23/2018   • Seborrheic keratoses 08/23/2018   • Neck pain 08/23/2018   • Anxiety 08/23/2018   • Dyslipidemia 08/23/2018       Family History   Problem Relation Age of Onset   • Cancer Other    • Cancer Paternal Aunt    • Arthritis Mother    • Hyperlipidemia Mother    • Arthritis Brother    • Cancer Brother         Prostate       She  has a past medical history of Anesthesia, Arthritis, and High cholesterol.  She  has a past surgical history that includes primary c section (1998); cholecystectomy (1999); blepharoplasty (2008); and bone biopsy (Left, 2/12/2016).       Objective:   Vitals obtained by patient:  Pulse 70   Ht 1.651 m (5' 5\")   Wt 54.9 kg (121 lb)   BMI 20.14 kg/m²       Physical Exam:  Constitutional: Alert, no distress, well-groomed.  Psych: normal affect.      Assessment and Plan:     1. Cervicalgia  - see below.     2. Decreased GFR  - encouraged hydration.     3. Neck pain  Patient now willing to get MRI. For bilateral numbness and tingling, may be cervical radiculopathy or carpal tunnel. Also recommended nighttime carpal tunnel wrist splint.   - LORazepam (ATIVAN) 1 MG Tab; Take 1 Tab by mouth 1 time a day as needed (spasm) for up to 45 days.  Dispense: 45 Tab; Refill: 0  - " MR-CERVICAL SPINE-W/O; Future    4. Anxiety  - see above.           Follow-up: Return if symptoms worsen or fail to improve.

## 2020-12-09 ENCOUNTER — HOSPITAL ENCOUNTER (OUTPATIENT)
Dept: LAB | Facility: MEDICAL CENTER | Age: 55
End: 2020-12-09
Attending: RADIOLOGY
Payer: COMMERCIAL

## 2020-12-09 LAB — SARS-COV-2 AB SERPL QL IA: NORMAL

## 2020-12-09 PROCEDURE — 86769 SARS-COV-2 COVID-19 ANTIBODY: CPT

## 2020-12-09 PROCEDURE — 36415 COLL VENOUS BLD VENIPUNCTURE: CPT

## 2020-12-16 DIAGNOSIS — Z23 NEED FOR VACCINATION: ICD-10-CM

## 2020-12-17 ENCOUNTER — IMMUNIZATION (OUTPATIENT)
Dept: FAMILY PLANNING/WOMEN'S HEALTH CLINIC | Facility: IMMUNIZATION CENTER | Age: 55
End: 2020-12-17
Attending: FAMILY MEDICINE

## 2020-12-17 DIAGNOSIS — Z23 NEED FOR VACCINATION: ICD-10-CM

## 2020-12-17 DIAGNOSIS — Z23 ENCOUNTER FOR VACCINATION: Primary | ICD-10-CM

## 2020-12-17 PROCEDURE — 0001A PFIZER SARS-COV-2 VACCINE: CPT | Performed by: FAMILY MEDICINE

## 2020-12-17 PROCEDURE — 91300 PFIZER SARS-COV-2 VACCINE: CPT | Performed by: FAMILY MEDICINE

## 2020-12-21 ENCOUNTER — HOSPITAL ENCOUNTER (OUTPATIENT)
Dept: RADIOLOGY | Facility: MEDICAL CENTER | Age: 55
End: 2020-12-21
Attending: FAMILY MEDICINE
Payer: COMMERCIAL

## 2020-12-21 DIAGNOSIS — M54.2 NECK PAIN: ICD-10-CM

## 2020-12-21 DIAGNOSIS — Z12.31 ENCOUNTER FOR SCREENING MAMMOGRAM FOR BREAST CANCER: ICD-10-CM

## 2020-12-21 PROCEDURE — 77067 SCR MAMMO BI INCL CAD: CPT

## 2020-12-21 PROCEDURE — 72141 MRI NECK SPINE W/O DYE: CPT

## 2020-12-22 ENCOUNTER — PATIENT MESSAGE (OUTPATIENT)
Dept: MEDICAL GROUP | Facility: MEDICAL CENTER | Age: 55
End: 2020-12-22

## 2020-12-22 DIAGNOSIS — R92.30 DENSE BREAST: ICD-10-CM

## 2020-12-22 DIAGNOSIS — R92.2 DENSE BREAST: ICD-10-CM

## 2021-01-08 ENCOUNTER — IMMUNIZATION (OUTPATIENT)
Dept: FAMILY PLANNING/WOMEN'S HEALTH CLINIC | Facility: IMMUNIZATION CENTER | Age: 56
End: 2021-01-08
Attending: FAMILY MEDICINE
Payer: COMMERCIAL

## 2021-01-08 DIAGNOSIS — Z23 ENCOUNTER FOR VACCINATION: Primary | ICD-10-CM

## 2021-01-08 PROCEDURE — 0002A PFIZER SARS-COV-2 VACCINE: CPT

## 2021-01-08 PROCEDURE — 91300 PFIZER SARS-COV-2 VACCINE: CPT

## 2021-02-25 ENCOUNTER — APPOINTMENT (OUTPATIENT)
Dept: RADIOLOGY | Facility: MEDICAL CENTER | Age: 56
End: 2021-02-25
Attending: FAMILY MEDICINE
Payer: COMMERCIAL

## 2021-03-01 ENCOUNTER — HOSPITAL ENCOUNTER (OUTPATIENT)
Dept: RADIOLOGY | Facility: MEDICAL CENTER | Age: 56
End: 2021-03-01
Attending: FAMILY MEDICINE
Payer: COMMERCIAL

## 2021-03-01 DIAGNOSIS — R92.2 DENSE BREAST: ICD-10-CM

## 2021-03-01 DIAGNOSIS — R92.30 DENSE BREAST: ICD-10-CM

## 2021-03-01 PROCEDURE — 76641 ULTRASOUND BREAST COMPLETE: CPT

## 2021-03-30 ENCOUNTER — EH NON-PROVIDER (OUTPATIENT)
Dept: OCCUPATIONAL MEDICINE | Facility: CLINIC | Age: 56
End: 2021-03-30

## 2021-03-30 PROCEDURE — 94375 RESPIRATORY FLOW VOLUME LOOP: CPT | Performed by: NURSE PRACTITIONER

## 2021-11-11 ENCOUNTER — HOSPITAL ENCOUNTER (OUTPATIENT)
Facility: MEDICAL CENTER | Age: 56
End: 2021-11-11
Attending: PHYSICIAN ASSISTANT
Payer: COMMERCIAL

## 2021-11-11 ENCOUNTER — OFFICE VISIT (OUTPATIENT)
Dept: MEDICAL GROUP | Facility: MEDICAL CENTER | Age: 56
End: 2021-11-11
Payer: COMMERCIAL

## 2021-11-11 VITALS
BODY MASS INDEX: 20.79 KG/M2 | OXYGEN SATURATION: 98 % | HEIGHT: 65 IN | SYSTOLIC BLOOD PRESSURE: 110 MMHG | WEIGHT: 124.8 LBS | TEMPERATURE: 97.4 F | HEART RATE: 62 BPM | DIASTOLIC BLOOD PRESSURE: 84 MMHG

## 2021-11-11 DIAGNOSIS — Z12.83 SCREENING FOR SKIN CANCER: ICD-10-CM

## 2021-11-11 DIAGNOSIS — M54.2 NECK PAIN: ICD-10-CM

## 2021-11-11 DIAGNOSIS — Z00.00 WELLNESS EXAMINATION: ICD-10-CM

## 2021-11-11 DIAGNOSIS — Z12.4 SCREENING FOR CERVICAL CANCER: ICD-10-CM

## 2021-11-11 PROCEDURE — 99396 PREV VISIT EST AGE 40-64: CPT | Performed by: PHYSICIAN ASSISTANT

## 2021-11-11 PROCEDURE — 88175 CYTOPATH C/V AUTO FLUID REDO: CPT

## 2021-11-11 PROCEDURE — 87624 HPV HI-RISK TYP POOLED RSLT: CPT

## 2021-11-11 RX ORDER — LORAZEPAM 1 MG/1
1 TABLET ORAL
Qty: 45 TABLET | Refills: 0 | Status: SHIPPED | OUTPATIENT
Start: 2021-11-11 | End: 2021-12-26

## 2021-11-12 NOTE — PROGRESS NOTES
SUBJECTIVE: 56 y.o. female for annual routine pap and checkup.  Chief Complaint   Patient presents with   • Gynecologic Exam   • Referral Needed     Dermatology, Dr. Althea Paredes   • Medication Refill     Ativan     Chronic intermittent recurrent neck muscle spasm. Uses ativan prn. Last prescribed with Dr. Cunha 12/2020 quantity #45, requesting refill    Last Pap: 2018  Contraception: menopause  H/O Abnormal Pap yes, over 30 years ago    LMP: No LMP recorded. (Menstrual status: Other).    Allergies: Penicillins     No menopause symptoms of hot flashes, night sweats, sleep disruption, mood changes, vaginal dryness.   No pelvic pain, or dyspareunia. No vaginal discharge   No breast tenderness, mass, nipple discharge, changes in size or contour, or abnormal cyclic discomfort.  No urinary tract symptoms, no incontinence.   No abdominal pain, change in bowel habits, black or bloody stools.    No unusual headaches, no visual changes.   No prolonged cough. No dyspnea or chest pain on exertion.    No skin lesions, concerns.     Preventive Care:  Mammogram up to date  Colonoscopy up to date      Current Outpatient Medications   Medication Sig Dispense Refill   • LORazepam (ATIVAN) 1 MG Tab Take 1 Tablet by mouth 1 time a day as needed (spasm) for up to 45 days. 45 Tablet 0   • Calcium-Magnesium-Vitamin D (CALCIUM MAGNESIUM PO) Take  by mouth every day.       No current facility-administered medications for this visit.     She  has a past medical history of Anesthesia, Arthritis, and High cholesterol.  She  has a past surgical history that includes primary c section (1998); cholecystectomy (1999); blepharoplasty (2008); and bone biopsy (Left, 2/12/2016).     Family History:   Family History   Problem Relation Age of Onset   • Cancer Other    • Cancer Paternal Aunt    • Arthritis Mother    • Hyperlipidemia Mother    • Arthritis Brother    • Cancer Brother         Prostate        OBJECTIVE:   /84 (BP Location: Right arm,  "Patient Position: Sitting, BP Cuff Size: Adult)   Pulse 62   Temp 36.3 °C (97.4 °F) (Temporal)   Ht 1.651 m (5' 5\")   Wt 56.6 kg (124 lb 12.8 oz)   SpO2 98%   Breastfeeding No   BMI 20.77 kg/m²   Body mass index is 20.77 kg/m².     Breast Exam: Performed with instruction during examination. No axillary lymphadenopathy, no skin changes, no dominant masses. No nipple retraction  Pelvic Exam - Sure Path Pap obtained and specimen sent to lab. Normal external genitalia with no lesions. Normal vaginal mucosa with normal rugation and no discharge. Cervix with no visible lesions. No cervical motion tenderness. Uterus is normal sized with no masses. No adnexal tenderness or enlargement appreciated.    <ASSESSMENT>  1. Wellness examination     2. Screening for cervical cancer  THINPREP PAP WITH HPV   3. Neck pain  LORazepam (ATIVAN) 1 MG Tab   4. Screening for skin cancer  Referral to Dermatology       F/u with Dr. Cunha as scheduled     "

## 2021-11-19 PROBLEM — M77.8 EXTENSOR CARPI ULNARIS TENDINITIS: Status: ACTIVE | Noted: 2021-11-19

## 2021-11-19 PROBLEM — M25.532 LEFT WRIST PAIN: Status: ACTIVE | Noted: 2021-11-19

## 2022-01-07 ENCOUNTER — HOSPITAL ENCOUNTER (OUTPATIENT)
Dept: RADIOLOGY | Facility: MEDICAL CENTER | Age: 57
End: 2022-01-07
Attending: FAMILY MEDICINE
Payer: COMMERCIAL

## 2022-01-07 DIAGNOSIS — Z12.31 VISIT FOR SCREENING MAMMOGRAM: ICD-10-CM

## 2022-01-07 PROCEDURE — 77067 SCR MAMMO BI INCL CAD: CPT

## 2022-01-13 ENCOUNTER — HOSPITAL ENCOUNTER (OUTPATIENT)
Dept: RADIOLOGY | Facility: MEDICAL CENTER | Age: 57
End: 2022-01-13
Attending: FAMILY MEDICINE
Payer: COMMERCIAL

## 2022-01-13 DIAGNOSIS — R92.8 ABNORMAL MAMMOGRAM: ICD-10-CM

## 2022-01-13 PROCEDURE — 76642 ULTRASOUND BREAST LIMITED: CPT | Mod: LT

## 2022-01-13 PROCEDURE — G0279 TOMOSYNTHESIS, MAMMO: HCPCS

## 2022-02-17 ENCOUNTER — APPOINTMENT (RX ONLY)
Dept: URBAN - METROPOLITAN AREA CLINIC 6 | Facility: CLINIC | Age: 57
Setting detail: DERMATOLOGY
End: 2022-02-17

## 2022-02-17 DIAGNOSIS — L81.4 OTHER MELANIN HYPERPIGMENTATION: ICD-10-CM

## 2022-02-17 DIAGNOSIS — D22 MELANOCYTIC NEVI: ICD-10-CM

## 2022-02-17 DIAGNOSIS — Z71.89 OTHER SPECIFIED COUNSELING: ICD-10-CM

## 2022-02-17 DIAGNOSIS — L82.1 OTHER SEBORRHEIC KERATOSIS: ICD-10-CM

## 2022-02-17 DIAGNOSIS — D485 NEOPLASM OF UNCERTAIN BEHAVIOR OF SKIN: ICD-10-CM

## 2022-02-17 DIAGNOSIS — L82.0 INFLAMED SEBORRHEIC KERATOSIS: ICD-10-CM

## 2022-02-17 DIAGNOSIS — D18.0 HEMANGIOMA: ICD-10-CM

## 2022-02-17 PROBLEM — D22.5 MELANOCYTIC NEVI OF TRUNK: Status: ACTIVE | Noted: 2022-02-17

## 2022-02-17 PROBLEM — D18.01 HEMANGIOMA OF SKIN AND SUBCUTANEOUS TISSUE: Status: ACTIVE | Noted: 2022-02-17

## 2022-02-17 PROBLEM — D48.5 NEOPLASM OF UNCERTAIN BEHAVIOR OF SKIN: Status: ACTIVE | Noted: 2022-02-17

## 2022-02-17 PROCEDURE — ? SUNSCREEN TREATMENT REGIMEN

## 2022-02-17 PROCEDURE — ? COUNSELING

## 2022-02-17 PROCEDURE — 11103 TANGNTL BX SKIN EA SEP/ADDL: CPT

## 2022-02-17 PROCEDURE — 99203 OFFICE O/P NEW LOW 30 MIN: CPT | Mod: 25

## 2022-02-17 PROCEDURE — ? BENIGN DESTRUCTION COSMETIC

## 2022-02-17 PROCEDURE — ? BIOPSY BY SHAVE METHOD

## 2022-02-17 PROCEDURE — 11102 TANGNTL BX SKIN SINGLE LES: CPT

## 2022-02-17 ASSESSMENT — LOCATION SIMPLE DESCRIPTION DERM
LOCATION SIMPLE: LEFT KNEE
LOCATION SIMPLE: CHEST
LOCATION SIMPLE: ABDOMEN
LOCATION SIMPLE: LEFT FOOT
LOCATION SIMPLE: LEFT HAND
LOCATION SIMPLE: LEFT POPLITEAL SKIN
LOCATION SIMPLE: RIGHT HAND
LOCATION SIMPLE: UPPER BACK
LOCATION SIMPLE: LEFT PRETIBIAL REGION
LOCATION SIMPLE: LEFT CALF
LOCATION SIMPLE: RIGHT PRETIBIAL REGION
LOCATION SIMPLE: LEFT FOREHEAD

## 2022-02-17 ASSESSMENT — LOCATION DETAILED DESCRIPTION DERM
LOCATION DETAILED: RIGHT RADIAL DORSAL HAND
LOCATION DETAILED: RIGHT PROXIMAL PRETIBIAL REGION
LOCATION DETAILED: LEFT DISTAL CALF
LOCATION DETAILED: LEFT PROXIMAL PRETIBIAL REGION
LOCATION DETAILED: LEFT DISTAL PRETIBIAL REGION
LOCATION DETAILED: LEFT RADIAL DORSAL HAND
LOCATION DETAILED: LEFT INFERIOR FOREHEAD
LOCATION DETAILED: LEFT KNEE
LOCATION DETAILED: EPIGASTRIC SKIN
LOCATION DETAILED: RIGHT LATERAL SUPERIOR CHEST
LOCATION DETAILED: SUPERIOR THORACIC SPINE
LOCATION DETAILED: LEFT PROXIMAL LATERAL CALF
LOCATION DETAILED: MIDDLE STERNUM
LOCATION DETAILED: LEFT LATERAL DORSAL FOOT
LOCATION DETAILED: LEFT POPLITEAL SKIN
LOCATION DETAILED: LEFT ULNAR DORSAL HAND

## 2022-02-17 ASSESSMENT — LOCATION ZONE DERM
LOCATION ZONE: HAND
LOCATION ZONE: FEET
LOCATION ZONE: LEG
LOCATION ZONE: TRUNK
LOCATION ZONE: FACE

## 2022-03-24 ENCOUNTER — APPOINTMENT (RX ONLY)
Dept: URBAN - METROPOLITAN AREA CLINIC 6 | Facility: CLINIC | Age: 57
Setting detail: DERMATOLOGY
End: 2022-03-24

## 2022-03-24 DIAGNOSIS — D22 MELANOCYTIC NEVI: ICD-10-CM

## 2022-03-24 PROBLEM — D22.72 MELANOCYTIC NEVI OF LEFT LOWER LIMB, INCLUDING HIP: Status: ACTIVE | Noted: 2022-03-24

## 2022-03-24 PROBLEM — C44.519 BASAL CELL CARCINOMA OF SKIN OF OTHER PART OF TRUNK: Status: ACTIVE | Noted: 2022-03-24

## 2022-03-24 PROCEDURE — ? EXCISION

## 2022-03-24 PROCEDURE — 11421 EXC H-F-NK-SP B9+MARG 0.6-1: CPT

## 2022-03-24 PROCEDURE — 12032 INTMD RPR S/A/T/EXT 2.6-7.5: CPT

## 2022-03-24 PROCEDURE — 11602 EXC TR-EXT MAL+MARG 1.1-2 CM: CPT

## 2022-03-24 ASSESSMENT — LOCATION ZONE DERM: LOCATION ZONE: FEET

## 2022-03-24 ASSESSMENT — LOCATION DETAILED DESCRIPTION DERM: LOCATION DETAILED: LEFT LATERAL DORSAL FOOT

## 2022-03-24 ASSESSMENT — LOCATION SIMPLE DESCRIPTION DERM: LOCATION SIMPLE: LEFT FOOT

## 2022-03-24 NOTE — PROCEDURE: EXCISION
Surgeon (Optional): Dr Dia
Biopsy Photograph Reviewed: Yes
Accession #: [id number]
Date Of Previous Biopsy (Optional): 2/17/2022
Size Of Lesion In Cm: 0.5
X Size Of Lesion In Cm (Optional): 0
Size Of Margin In Cm: 0.4
Anesthesia Volume In Cc: 6
Was An Eye Clamp Used?: No
Eye Clamp Note Details: An eye clamp was used during the procedure.
Excision Method: Elliptical
Saucerization Depth: dermis and superficial adipose tissue
Repair Type: Intermediate
Suturegard Retention Suture: 2-0 Nylon
Retention Suture Bite Size: 3 mm
Length To Time In Minutes Device Was In Place: 10
Number Of Hemigard Strips Per Side: 1
Intermediate / Complex Repair - Final Wound Length In Cm: 3.8
Undermining Type: Entire Wound
Debridement Text: The wound edges were debrided prior to proceeding with the closure to facilitate wound healing.
Helical Rim Text: The closure involved the helical rim.
Vermilion Border Text: The closure involved the vermilion border.
Nostril Rim Text: The closure involved the nostril rim.
Retention Suture Text: Retention sutures were placed to support the closure and prevent dehiscence.
Lab: [id number]
Lab Facility: 
Graft Donor Site Bandage (Optional-Leave Blank If You Don't Want In Note): Steri-strips and a pressure bandage were applied to the donor site.
Epidermal Closure Graft Donor Site (Optional): simple interrupted
Billing Type: Third-Party Bill
Excision Depth: adipose tissue
Scalpel Size: 15 blade
Anesthesia Type: 1% lidocaine with epinephrine and a 1:10 solution of 8.4% sodium bicarbonate
Hemostasis: Electrodesiccation
Estimated Blood Loss (Cc): minimal
Detail Level: Detailed
Deep Sutures: 4-0 Maxon
Dermal Closure: buried vertical mattress
Epidermal Sutures: 5-0 Caprosyn
Epidermal Closure: running subcuticular
Wound Care: Vaseline
Dressing: pressure dressing
Suturegard Intro: Intraoperative tissue expansion was performed, utilizing the SUTUREGARD device, in order to reduce wound tension.
Suturegard Body: The suture ends were repeatedly re-tightened and re-clamped to achieve the desired tissue expansion.
Hemigard Intro: Due to skin fragility and wound tension, it was decided to use HEMIGARD adhesive retention suture devices to permit a linear closure. The skin was cleaned and dried for a 6cm distance away from the wound. Excessive hair, if present, was removed to allow for adhesion.
Hemigard Postcare Instructions: The HEMIGARD strips are to remain completely dry for at least 5-7 days.
Positioning (Leave Blank If You Do Not Want): The patient was placed in a comfortable position exposing the surgical site.
Pre-Excision Curettage Text (Leave Blank If You Do Not Want): Prior to drawing the surgical margin the visible lesion was removed with electrodesiccation and curettage to clearly define the lesion size.
Complex Repair Preamble Text (Leave Blank If You Do Not Want): Extensive wide undermining was performed.
Intermediate Repair Preamble Text (Leave Blank If You Do Not Want): Undermining was performed with blunt dissection.
Curvilinear Excision Additional Text (Leave Blank If You Do Not Want): The margin was drawn around the clinically apparent lesion.  A curvilinear shape was then drawn on the skin incorporating the lesion and margins.  Incisions were then made along these lines to the appropriate tissue plane and the lesion was extirpated.
Fusiform Excision Additional Text (Leave Blank If You Do Not Want): The margin was drawn around the clinically apparent lesion.  A fusiform shape was then drawn on the skin incorporating the lesion and margins.  Incisions were then made along these lines to the appropriate tissue plane and the lesion was extirpated.
Elliptical Excision Additional Text (Leave Blank If You Do Not Want): The margin was drawn around the clinically apparent lesion.  An elliptical shape was then drawn on the skin incorporating the lesion and margins.  Incisions were then made along these lines to the appropriate tissue plane and the lesion was extirpated.
Saucerization Excision Additional Text (Leave Blank If You Do Not Want): The margin was drawn around the clinically apparent lesion.  Incisions were then made along these lines, in a tangential fashion, to the appropriate tissue plane and the lesion was extirpated.
Slit Excision Additional Text (Leave Blank If You Do Not Want): A linear line was drawn on the skin overlying the lesion. An incision was made slowly until the lesion was visualized.  Once visualized, the lesion was removed with blunt dissection.
Excisional Biopsy Additional Text (Leave Blank If You Do Not Want): The margin was drawn around the clinically apparent lesion. An elliptical shape was then drawn on the skin incorporating the lesion and margins.  Incisions were then made along these lines to the appropriate tissue plane and the lesion was extirpated.
Perilesional Excision Additional Text (Leave Blank If You Do Not Want): The margin was drawn around the clinically apparent lesion. Incisions were then made along these lines to the appropriate tissue plane and the lesion was extirpated.
Repair Performed By Another Provider Text (Leave Blank If You Do Not Want): After the tissue was excised the defect was repaired by another provider.
No Repair - Repaired With Adjacent Surgical Defect Text (Leave Blank If You Do Not Want): After the excision the defect was repaired concurrently with another surgical defect which was in close approximation.
Adjacent Tissue Transfer Text: The defect edges were debeveled with a #15 scalpel blade.  Given the location of the defect and the proximity to free margins an adjacent tissue transfer was deemed most appropriate.  Using a sterile surgical marker, an appropriate flap was drawn incorporating the defect and placing the expected incisions within the relaxed skin tension lines where possible.    The area thus outlined was incised deep to adipose tissue with a #15 scalpel blade.  The skin margins were undermined to an appropriate distance in all directions utilizing iris scissors.
Advancement Flap (Single) Text: The defect edges were debeveled with a #15 scalpel blade.  Given the location of the defect and the proximity to free margins a single advancement flap was deemed most appropriate.  Using a sterile surgical marker, an appropriate advancement flap was drawn incorporating the defect and placing the expected incisions within the relaxed skin tension lines where possible.    The area thus outlined was incised deep to adipose tissue with a #15 scalpel blade.  The skin margins were undermined to an appropriate distance in all directions utilizing iris scissors.
Advancement Flap (Double) Text: The defect edges were debeveled with a #15 scalpel blade.  Given the location of the defect and the proximity to free margins a double advancement flap was deemed most appropriate.  Using a sterile surgical marker, the appropriate advancement flaps were drawn incorporating the defect and placing the expected incisions within the relaxed skin tension lines where possible.    The area thus outlined was incised deep to adipose tissue with a #15 scalpel blade.  The skin margins were undermined to an appropriate distance in all directions utilizing iris scissors.
Burow's Advancement Flap Text: The defect edges were debeveled with a #15 scalpel blade.  Given the location of the defect and the proximity to free margins a Burow's advancement flap was deemed most appropriate.  Using a sterile surgical marker, the appropriate advancement flap was drawn incorporating the defect and placing the expected incisions within the relaxed skin tension lines where possible.    The area thus outlined was incised deep to adipose tissue with a #15 scalpel blade.  The skin margins were undermined to an appropriate distance in all directions utilizing iris scissors.
Chonodrocutaneous Helical Advancement Flap Text: The defect edges were debeveled with a #15 scalpel blade.  Given the location of the defect and the proximity to free margins a chondrocutaneous helical advancement flap was deemed most appropriate.  Using a sterile surgical marker, the appropriate advancement flap was drawn incorporating the defect and placing the expected incisions within the relaxed skin tension lines where possible.    The area thus outlined was incised deep to adipose tissue with a #15 scalpel blade.  The skin margins were undermined to an appropriate distance in all directions utilizing iris scissors.
Crescentic Advancement Flap Text: The defect edges were debeveled with a #15 scalpel blade.  Given the location of the defect and the proximity to free margins a crescentic advancement flap was deemed most appropriate.  Using a sterile surgical marker, the appropriate advancement flap was drawn incorporating the defect and placing the expected incisions within the relaxed skin tension lines where possible.    The area thus outlined was incised deep to adipose tissue with a #15 scalpel blade.  The skin margins were undermined to an appropriate distance in all directions utilizing iris scissors.
A-T Advancement Flap Text: The defect edges were debeveled with a #15 scalpel blade.  Given the location of the defect, shape of the defect and the proximity to free margins an A-T advancement flap was deemed most appropriate.  Using a sterile surgical marker, an appropriate advancement flap was drawn incorporating the defect and placing the expected incisions within the relaxed skin tension lines where possible.    The area thus outlined was incised deep to adipose tissue with a #15 scalpel blade.  The skin margins were undermined to an appropriate distance in all directions utilizing iris scissors.
O-T Advancement Flap Text: The defect edges were debeveled with a #15 scalpel blade.  Given the location of the defect, shape of the defect and the proximity to free margins an O-T advancement flap was deemed most appropriate.  Using a sterile surgical marker, an appropriate advancement flap was drawn incorporating the defect and placing the expected incisions within the relaxed skin tension lines where possible.    The area thus outlined was incised deep to adipose tissue with a #15 scalpel blade.  The skin margins were undermined to an appropriate distance in all directions utilizing iris scissors.
O-L Flap Text: The defect edges were debeveled with a #15 scalpel blade.  Given the location of the defect, shape of the defect and the proximity to free margins an O-L flap was deemed most appropriate.  Using a sterile surgical marker, an appropriate advancement flap was drawn incorporating the defect and placing the expected incisions within the relaxed skin tension lines where possible.    The area thus outlined was incised deep to adipose tissue with a #15 scalpel blade.  The skin margins were undermined to an appropriate distance in all directions utilizing iris scissors.
O-Z Flap Text: The defect edges were debeveled with a #15 scalpel blade.  Given the location of the defect, shape of the defect and the proximity to free margins an O-Z flap was deemed most appropriate.  Using a sterile surgical marker, an appropriate transposition flap was drawn incorporating the defect and placing the expected incisions within the relaxed skin tension lines where possible. The area thus outlined was incised deep to adipose tissue with a #15 scalpel blade.  The skin margins were undermined to an appropriate distance in all directions utilizing iris scissors.
Double O-Z Flap Text: The defect edges were debeveled with a #15 scalpel blade.  Given the location of the defect, shape of the defect and the proximity to free margins a Double O-Z flap was deemed most appropriate.  Using a sterile surgical marker, an appropriate transposition flap was drawn incorporating the defect and placing the expected incisions within the relaxed skin tension lines where possible. The area thus outlined was incised deep to adipose tissue with a #15 scalpel blade.  The skin margins were undermined to an appropriate distance in all directions utilizing iris scissors.
V-Y Flap Text: The defect edges were debeveled with a #15 scalpel blade.  Given the location of the defect, shape of the defect and the proximity to free margins a V-Y flap was deemed most appropriate.  Using a sterile surgical marker, an appropriate advancement flap was drawn incorporating the defect and placing the expected incisions within the relaxed skin tension lines where possible.    The area thus outlined was incised deep to adipose tissue with a #15 scalpel blade.  The skin margins were undermined to an appropriate distance in all directions utilizing iris scissors.
Advancement-Rotation Flap Text: The defect edges were debeveled with a #15 scalpel blade.  Given the location of the defect, shape of the defect and the proximity to free margins an advancement-rotation flap was deemed most appropriate.  Using a sterile surgical marker, an appropriate flap was drawn incorporating the defect and placing the expected incisions within the relaxed skin tension lines where possible. The area thus outlined was incised deep to adipose tissue with a #15 scalpel blade.  The skin margins were undermined to an appropriate distance in all directions utilizing iris scissors.
Mercedes Flap Text: The defect edges were debeveled with a #15 scalpel blade.  Given the location of the defect, shape of the defect and the proximity to free margins a Mercedes flap was deemed most appropriate.  Using a sterile surgical marker, an appropriate advancement flap was drawn incorporating the defect and placing the expected incisions within the relaxed skin tension lines where possible. The area thus outlined was incised deep to adipose tissue with a #15 scalpel blade.  The skin margins were undermined to an appropriate distance in all directions utilizing iris scissors.
Modified Advancement Flap Text: The defect edges were debeveled with a #15 scalpel blade.  Given the location of the defect, shape of the defect and the proximity to free margins a modified advancement flap was deemed most appropriate.  Using a sterile surgical marker, an appropriate advancement flap was drawn incorporating the defect and placing the expected incisions within the relaxed skin tension lines where possible.    The area thus outlined was incised deep to adipose tissue with a #15 scalpel blade.  The skin margins were undermined to an appropriate distance in all directions utilizing iris scissors.
Mucosal Advancement Flap Text: Given the location of the defect, shape of the defect and the proximity to free margins a mucosal advancement flap was deemed most appropriate. Incisions were made with a 15 blade scalpel in the appropriate fashion along the cutaneous vermilion border and the mucosal lip. The remaining actinically damaged mucosal tissue was excised.  The mucosal advancement flap was then elevated to the gingival sulcus with care taken to preserve the neurovascular structures and advanced into the primary defect. Care was taken to ensure that precise realignment of the vermilion border was achieved.
Peng Advancement Flap Text: The defect edges were debeveled with a #15 scalpel blade.  Given the location of the defect, shape of the defect and the proximity to free margins a Peng advancement flap was deemed most appropriate.  Using a sterile surgical marker, an appropriate advancement flap was drawn incorporating the defect and placing the expected incisions within the relaxed skin tension lines where possible. The area thus outlined was incised deep to adipose tissue with a #15 scalpel blade.  The skin margins were undermined to an appropriate distance in all directions utilizing iris scissors.
Hatchet Flap Text: The defect edges were debeveled with a #15 scalpel blade.  Given the location of the defect, shape of the defect and the proximity to free margins a hatchet flap was deemed most appropriate.  Using a sterile surgical marker, an appropriate hatchet flap was drawn incorporating the defect and placing the expected incisions within the relaxed skin tension lines where possible.    The area thus outlined was incised deep to adipose tissue with a #15 scalpel blade.  The skin margins were undermined to an appropriate distance in all directions utilizing iris scissors.
Rotation Flap Text: The defect edges were debeveled with a #15 scalpel blade.  Given the location of the defect, shape of the defect and the proximity to free margins a rotation flap was deemed most appropriate.  Using a sterile surgical marker, an appropriate rotation flap was drawn incorporating the defect and placing the expected incisions within the relaxed skin tension lines where possible.    The area thus outlined was incised deep to adipose tissue with a #15 scalpel blade.  The skin margins were undermined to an appropriate distance in all directions utilizing iris scissors.
Spiral Flap Text: The defect edges were debeveled with a #15 scalpel blade.  Given the location of the defect, shape of the defect and the proximity to free margins a spiral flap was deemed most appropriate.  Using a sterile surgical marker, an appropriate rotation flap was drawn incorporating the defect and placing the expected incisions within the relaxed skin tension lines where possible. The area thus outlined was incised deep to adipose tissue with a #15 scalpel blade.  The skin margins were undermined to an appropriate distance in all directions utilizing iris scissors.
Staged Advancement Flap Text: The defect edges were debeveled with a #15 scalpel blade.  Given the location of the defect, shape of the defect and the proximity to free margins a staged advancement flap was deemed most appropriate.  Using a sterile surgical marker, an appropriate advancement flap was drawn incorporating the defect and placing the expected incisions within the relaxed skin tension lines where possible. The area thus outlined was incised deep to adipose tissue with a #15 scalpel blade.  The skin margins were undermined to an appropriate distance in all directions utilizing iris scissors.
Star Wedge Flap Text: The defect edges were debeveled with a #15 scalpel blade.  Given the location of the defect, shape of the defect and the proximity to free margins a star wedge flap was deemed most appropriate.  Using a sterile surgical marker, an appropriate rotation flap was drawn incorporating the defect and placing the expected incisions within the relaxed skin tension lines where possible. The area thus outlined was incised deep to adipose tissue with a #15 scalpel blade.  The skin margins were undermined to an appropriate distance in all directions utilizing iris scissors.
Transposition Flap Text: The defect edges were debeveled with a #15 scalpel blade.  Given the location of the defect and the proximity to free margins a transposition flap was deemed most appropriate.  Using a sterile surgical marker, an appropriate transposition flap was drawn incorporating the defect.    The area thus outlined was incised deep to adipose tissue with a #15 scalpel blade.  The skin margins were undermined to an appropriate distance in all directions utilizing iris scissors.
Muscle Hinge Flap Text: The defect edges were debeveled with a #15 scalpel blade.  Given the size, depth and location of the defect and the proximity to free margins a muscle hinge flap was deemed most appropriate.  Using a sterile surgical marker, an appropriate hinge flap was drawn incorporating the defect. The area thus outlined was incised with a #15 scalpel blade.  The skin margins were undermined to an appropriate distance in all directions utilizing iris scissors.
Mustarde Flap Text: The defect edges were debeveled with a #15 scalpel blade.  Given the size, depth and location of the defect and the proximity to free margins a Mustarde flap was deemed most appropriate.  Using a sterile surgical marker, an appropriate flap was drawn incorporating the defect. The area thus outlined was incised with a #15 scalpel blade.  The skin margins were undermined to an appropriate distance in all directions utilizing iris scissors.
Nasal Turnover Hinge Flap Text: The defect edges were debeveled with a #15 scalpel blade.  Given the size, depth, location of the defect and the defect being full thickness a nasal turnover hinge flap was deemed most appropriate.  Using a sterile surgical marker, an appropriate hinge flap was drawn incorporating the defect. The area thus outlined was incised with a #15 scalpel blade. The flap was designed to recreate the nasal mucosal lining and the alar rim. The skin margins were undermined to an appropriate distance in all directions utilizing iris scissors.
Nasalis-Muscle-Based Myocutaneous Island Pedicle Flap Text: Using a #15 blade, an incision was made around the donor flap to the level of the nasalis muscle. Wide lateral undermining was then performed in both the subcutaneous plane above the nasalis muscle, and in a submuscular plane just above periosteum. This allowed the formation of a free nasalis muscle axial pedicle (based on the angular artery) which was still attached to the actual cutaneous flap, increasing its mobility and vascular viability. Hemostasis was obtained with pinpoint electrocoagulation. The flap was mobilized into position and the pivotal anchor points positioned and stabilized with buried interrupted sutures. Subcutaneous and dermal tissues were closed in a multilayered fashion with sutures. Tissue redundancies were excised, and the epidermal edges were apposed without significant tension and sutured with sutures.
Orbicularis Oris Muscle Flap Text: The defect edges were debeveled with a #15 scalpel blade.  Given that the defect affected the competency of the oral sphincter an obicularis oris muscle flap was deemed most appropriate to restore this competency and normal muscle function.  Using a sterile surgical marker, an appropriate flap was drawn incorporating the defect. The area thus outlined was incised with a #15 scalpel blade.
Melolabial Transposition Flap Text: The defect edges were debeveled with a #15 scalpel blade.  Given the location of the defect and the proximity to free margins a melolabial flap was deemed most appropriate.  Using a sterile surgical marker, an appropriate melolabial transposition flap was drawn incorporating the defect.    The area thus outlined was incised deep to adipose tissue with a #15 scalpel blade.  The skin margins were undermined to an appropriate distance in all directions utilizing iris scissors.
Rhombic Flap Text: The defect edges were debeveled with a #15 scalpel blade.  Given the location of the defect and the proximity to free margins a rhombic flap was deemed most appropriate.  Using a sterile surgical marker, an appropriate rhombic flap was drawn incorporating the defect.    The area thus outlined was incised deep to adipose tissue with a #15 scalpel blade.  The skin margins were undermined to an appropriate distance in all directions utilizing iris scissors.
Rhomboid Transposition Flap Text: The defect edges were debeveled with a #15 scalpel blade.  Given the location of the defect and the proximity to free margins a rhomboid transposition flap was deemed most appropriate.  Using a sterile surgical marker, an appropriate rhomboid flap was drawn incorporating the defect.    The area thus outlined was incised deep to adipose tissue with a #15 scalpel blade.  The skin margins were undermined to an appropriate distance in all directions utilizing iris scissors.
Bi-Rhombic Flap Text: The defect edges were debeveled with a #15 scalpel blade.  Given the location of the defect and the proximity to free margins a bi-rhombic flap was deemed most appropriate.  Using a sterile surgical marker, an appropriate rhombic flap was drawn incorporating the defect. The area thus outlined was incised deep to adipose tissue with a #15 scalpel blade.  The skin margins were undermined to an appropriate distance in all directions utilizing iris scissors.
Helical Rim Advancement Flap Text: The defect edges were debeveled with a #15 blade scalpel.  Given the location of the defect and the proximity to free margins (helical rim) a double helical rim advancement flap was deemed most appropriate.  Using a sterile surgical marker, the appropriate advancement flaps were drawn incorporating the defect and placing the expected incisions between the helical rim and antihelix where possible.  The area thus outlined was incised through and through with a #15 scalpel blade.  With a skin hook and iris scissors, the flaps were gently and sharply undermined and freed up.
Bilateral Helical Rim Advancement Flap Text: The defect edges were debeveled with a #15 blade scalpel.  Given the location of the defect and the proximity to free margins (helical rim) a bilateral helical rim advancement flap was deemed most appropriate.  Using a sterile surgical marker, the appropriate advancement flaps were drawn incorporating the defect and placing the expected incisions between the helical rim and antihelix where possible.  The area thus outlined was incised through and through with a #15 scalpel blade.  With a skin hook and iris scissors, the flaps were gently and sharply undermined and freed up.
Ear Star Wedge Flap Text: The defect edges were debeveled with a #15 blade scalpel.  Given the location of the defect and the proximity to free margins (helical rim) an ear star wedge flap was deemed most appropriate.  Using a sterile surgical marker, the appropriate flap was drawn incorporating the defect and placing the expected incisions between the helical rim and antihelix where possible.  The area thus outlined was incised through and through with a #15 scalpel blade.
Banner Transposition Flap Text: The defect edges were debeveled with a #15 scalpel blade.  Given the location of the defect and the proximity to free margins a Banner transposition flap was deemed most appropriate.  Using a sterile surgical marker, an appropriate flap drawn around the defect. The area thus outlined was incised deep to adipose tissue with a #15 scalpel blade.  The skin margins were undermined to an appropriate distance in all directions utilizing iris scissors.
Bilobed Flap Text: The defect edges were debeveled with a #15 scalpel blade.  Given the location of the defect and the proximity to free margins a bilobe flap was deemed most appropriate.  Using a sterile surgical marker, an appropriate bilobe flap drawn around the defect.    The area thus outlined was incised deep to adipose tissue with a #15 scalpel blade.  The skin margins were undermined to an appropriate distance in all directions utilizing iris scissors.
Bilobed Transposition Flap Text: The defect edges were debeveled with a #15 scalpel blade.  Given the location of the defect and the proximity to free margins a bilobed transposition flap was deemed most appropriate.  Using a sterile surgical marker, an appropriate bilobe flap drawn around the defect.    The area thus outlined was incised deep to adipose tissue with a #15 scalpel blade.  The skin margins were undermined to an appropriate distance in all directions utilizing iris scissors.
Trilobed Flap Text: The defect edges were debeveled with a #15 scalpel blade.  Given the location of the defect and the proximity to free margins a trilobed flap was deemed most appropriate.  Using a sterile surgical marker, an appropriate trilobed flap drawn around the defect.    The area thus outlined was incised deep to adipose tissue with a #15 scalpel blade.  The skin margins were undermined to an appropriate distance in all directions utilizing iris scissors.
Dorsal Nasal Flap Text: The defect edges were debeveled with a #15 scalpel blade.  Given the location of the defect and the proximity to free margins a dorsal nasal flap was deemed most appropriate.  Using a sterile surgical marker, an appropriate dorsal nasal flap was drawn around the defect.    The area thus outlined was incised deep to adipose tissue with a #15 scalpel blade.  The skin margins were undermined to an appropriate distance in all directions utilizing iris scissors.
Island Pedicle Flap Text: The defect edges were debeveled with a #15 scalpel blade.  Given the location of the defect, shape of the defect and the proximity to free margins an island pedicle advancement flap was deemed most appropriate.  Using a sterile surgical marker, an appropriate advancement flap was drawn incorporating the defect, outlining the appropriate donor tissue and placing the expected incisions within the relaxed skin tension lines where possible.    The area thus outlined was incised deep to adipose tissue with a #15 scalpel blade.  The skin margins were undermined to an appropriate distance in all directions around the primary defect and laterally outward around the island pedicle utilizing iris scissors.  There was minimal undermining beneath the pedicle flap.
Island Pedicle Flap With Canthal Suspension Text: The defect edges were debeveled with a #15 scalpel blade.  Given the location of the defect, shape of the defect and the proximity to free margins an island pedicle advancement flap was deemed most appropriate.  Using a sterile surgical marker, an appropriate advancement flap was drawn incorporating the defect, outlining the appropriate donor tissue and placing the expected incisions within the relaxed skin tension lines where possible. The area thus outlined was incised deep to adipose tissue with a #15 scalpel blade.  The skin margins were undermined to an appropriate distance in all directions around the primary defect and laterally outward around the island pedicle utilizing iris scissors.  There was minimal undermining beneath the pedicle flap. A suspension suture was placed in the canthal tendon to prevent tension and prevent ectropion.
Alar Island Pedicle Flap Text: The defect edges were debeveled with a #15 scalpel blade.  Given the location of the defect, shape of the defect and the proximity to the alar rim an island pedicle advancement flap was deemed most appropriate.  Using a sterile surgical marker, an appropriate advancement flap was drawn incorporating the defect, outlining the appropriate donor tissue and placing the expected incisions within the nasal ala running parallel to the alar rim. The area thus outlined was incised with a #15 scalpel blade.  The skin margins were undermined minimally to an appropriate distance in all directions around the primary defect and laterally outward around the island pedicle utilizing iris scissors.  There was minimal undermining beneath the pedicle flap.
Double Island Pedicle Flap Text: The defect edges were debeveled with a #15 scalpel blade.  Given the location of the defect, shape of the defect and the proximity to free margins a double island pedicle advancement flap was deemed most appropriate.  Using a sterile surgical marker, an appropriate advancement flap was drawn incorporating the defect, outlining the appropriate donor tissue and placing the expected incisions within the relaxed skin tension lines where possible.    The area thus outlined was incised deep to adipose tissue with a #15 scalpel blade.  The skin margins were undermined to an appropriate distance in all directions around the primary defect and laterally outward around the island pedicle utilizing iris scissors.  There was minimal undermining beneath the pedicle flap.
Island Pedicle Flap-Requiring Vessel Identification Text: The defect edges were debeveled with a #15 scalpel blade.  Given the location of the defect, shape of the defect and the proximity to free margins an island pedicle advancement flap was deemed most appropriate.  Using a sterile surgical marker, an appropriate advancement flap was drawn, based on the axial vessel mentioned above, incorporating the defect, outlining the appropriate donor tissue and placing the expected incisions within the relaxed skin tension lines where possible.    The area thus outlined was incised deep to adipose tissue with a #15 scalpel blade.  The skin margins were undermined to an appropriate distance in all directions around the primary defect and laterally outward around the island pedicle utilizing iris scissors.  There was minimal undermining beneath the pedicle flap.
Keystone Flap Text: The defect edges were debeveled with a #15 scalpel blade.  Given the location of the defect, shape of the defect a keystone flap was deemed most appropriate.  Using a sterile surgical marker, an appropriate keystone flap was drawn incorporating the defect, outlining the appropriate donor tissue and placing the expected incisions within the relaxed skin tension lines where possible. The area thus outlined was incised deep to adipose tissue with a #15 scalpel blade.  The skin margins were undermined to an appropriate distance in all directions around the primary defect and laterally outward around the flap utilizing iris scissors.
O-T Plasty Text: The defect edges were debeveled with a #15 scalpel blade.  Given the location of the defect, shape of the defect and the proximity to free margins an O-T plasty was deemed most appropriate.  Using a sterile surgical marker, an appropriate O-T plasty was drawn incorporating the defect and placing the expected incisions within the relaxed skin tension lines where possible.    The area thus outlined was incised deep to adipose tissue with a #15 scalpel blade.  The skin margins were undermined to an appropriate distance in all directions utilizing iris scissors.
O-Z Plasty Text: The defect edges were debeveled with a #15 scalpel blade.  Given the location of the defect, shape of the defect and the proximity to free margins an O-Z plasty (double transposition flap) was deemed most appropriate.  Using a sterile surgical marker, the appropriate transposition flaps were drawn incorporating the defect and placing the expected incisions within the relaxed skin tension lines where possible.    The area thus outlined was incised deep to adipose tissue with a #15 scalpel blade.  The skin margins were undermined to an appropriate distance in all directions utilizing iris scissors.  Hemostasis was achieved with electrocautery.  The flaps were then transposed into place, one clockwise and the other counterclockwise, and anchored with interrupted buried subcutaneous sutures.
Double O-Z Plasty Text: The defect edges were debeveled with a #15 scalpel blade.  Given the location of the defect, shape of the defect and the proximity to free margins a Double O-Z plasty (double transposition flap) was deemed most appropriate.  Using a sterile surgical marker, the appropriate transposition flaps were drawn incorporating the defect and placing the expected incisions within the relaxed skin tension lines where possible. The area thus outlined was incised deep to adipose tissue with a #15 scalpel blade.  The skin margins were undermined to an appropriate distance in all directions utilizing iris scissors.  Hemostasis was achieved with electrocautery.  The flaps were then transposed into place, one clockwise and the other counterclockwise, and anchored with interrupted buried subcutaneous sutures.
V-Y Plasty Text: The defect edges were debeveled with a #15 scalpel blade.  Given the location of the defect, shape of the defect and the proximity to free margins an V-Y advancement flap was deemed most appropriate.  Using a sterile surgical marker, an appropriate advancement flap was drawn incorporating the defect and placing the expected incisions within the relaxed skin tension lines where possible.    The area thus outlined was incised deep to adipose tissue with a #15 scalpel blade.  The skin margins were undermined to an appropriate distance in all directions utilizing iris scissors.
H Plasty Text: Given the location of the defect, shape of the defect and the proximity to free margins a H-plasty was deemed most appropriate for repair.  Using a sterile surgical marker, the appropriate advancement arms of the H-plasty were drawn incorporating the defect and placing the expected incisions within the relaxed skin tension lines where possible. The area thus outlined was incised deep to adipose tissue with a #15 scalpel blade. The skin margins were undermined to an appropriate distance in all directions utilizing iris scissors.  The opposing advancement arms were then advanced into place in opposite direction and anchored with interrupted buried subcutaneous sutures.
W Plasty Text: The lesion was extirpated to the level of the fat with a #15 scalpel blade.  Given the location of the defect, shape of the defect and the proximity to free margins a W-plasty was deemed most appropriate for repair.  Using a sterile surgical marker, the appropriate transposition arms of the W-plasty were drawn incorporating the defect and placing the expected incisions within the relaxed skin tension lines where possible.    The area thus outlined was incised deep to adipose tissue with a #15 scalpel blade.  The skin margins were undermined to an appropriate distance in all directions utilizing iris scissors.  The opposing transposition arms were then transposed into place in opposite direction and anchored with interrupted buried subcutaneous sutures.
Z Plasty Text: The lesion was extirpated to the level of the fat with a #15 scalpel blade.  Given the location of the defect, shape of the defect and the proximity to free margins a Z-plasty was deemed most appropriate for repair.  Using a sterile surgical marker, the appropriate transposition arms of the Z-plasty were drawn incorporating the defect and placing the expected incisions within the relaxed skin tension lines where possible.    The area thus outlined was incised deep to adipose tissue with a #15 scalpel blade.  The skin margins were undermined to an appropriate distance in all directions utilizing iris scissors.  The opposing transposition arms were then transposed into place in opposite direction and anchored with interrupted buried subcutaneous sutures.
Zygomaticofacial Flap Text: Given the location of the defect, shape of the defect and the proximity to free margins a zygomaticofacial flap was deemed most appropriate for repair.  Using a sterile surgical marker, the appropriate flap was drawn incorporating the defect and placing the expected incisions within the relaxed skin tension lines where possible. The area thus outlined was incised deep to adipose tissue with a #15 scalpel blade with preservation of a vascular pedicle.  The skin margins were undermined to an appropriate distance in all directions utilizing iris scissors.  The flap was then placed into the defect and anchored with interrupted buried subcutaneous sutures.
Cheek Interpolation Flap Text: A decision was made to reconstruct the defect utilizing an interpolation axial flap and a staged reconstruction.  A telfa template was made of the defect.  This telfa template was then used to outline the Cheek Interpolation flap.  The donor area for the pedicle flap was then injected with anesthesia.  The flap was excised through the skin and subcutaneous tissue down to the layer of the underlying musculature.  The interpolation flap was carefully excised within this deep plane to maintain its blood supply.  The edges of the donor site were undermined.   The donor site was closed in a primary fashion.  The pedicle was then rotated into position and sutured.  Once the tube was sutured into place, adequate blood supply was confirmed with blanching and refill.  The pedicle was then wrapped with xeroform gauze and dressed appropriately with a telfa and gauze bandage to ensure continued blood supply and protect the attached pedicle.
Cheek-To-Nose Interpolation Flap Text: A decision was made to reconstruct the defect utilizing an interpolation axial flap and a staged reconstruction.  A telfa template was made of the defect.  This telfa template was then used to outline the Cheek-To-Nose Interpolation flap.  The donor area for the pedicle flap was then injected with anesthesia.  The flap was excised through the skin and subcutaneous tissue down to the layer of the underlying musculature.  The interpolation flap was carefully excised within this deep plane to maintain its blood supply.  The edges of the donor site were undermined.   The donor site was closed in a primary fashion.  The pedicle was then rotated into position and sutured.  Once the tube was sutured into place, adequate blood supply was confirmed with blanching and refill.  The pedicle was then wrapped with xeroform gauze and dressed appropriately with a telfa and gauze bandage to ensure continued blood supply and protect the attached pedicle.
Interpolation Flap Text: A decision was made to reconstruct the defect utilizing an interpolation axial flap and a staged reconstruction.  A telfa template was made of the defect.  This telfa template was then used to outline the interpolation flap.  The donor area for the pedicle flap was then injected with anesthesia.  The flap was excised through the skin and subcutaneous tissue down to the layer of the underlying musculature.  The interpolation flap was carefully excised within this deep plane to maintain its blood supply.  The edges of the donor site were undermined.   The donor site was closed in a primary fashion.  The pedicle was then rotated into position and sutured.  Once the tube was sutured into place, adequate blood supply was confirmed with blanching and refill.  The pedicle was then wrapped with xeroform gauze and dressed appropriately with a telfa and gauze bandage to ensure continued blood supply and protect the attached pedicle.
Melolabial Interpolation Flap Text: A decision was made to reconstruct the defect utilizing an interpolation axial flap and a staged reconstruction.  A telfa template was made of the defect.  This telfa template was then used to outline the melolabial interpolation flap.  The donor area for the pedicle flap was then injected with anesthesia.  The flap was excised through the skin and subcutaneous tissue down to the layer of the underlying musculature.  The pedicle flap was carefully excised within this deep plane to maintain its blood supply.  The edges of the donor site were undermined.   The donor site was closed in a primary fashion.  The pedicle was then rotated into position and sutured.  Once the tube was sutured into place, adequate blood supply was confirmed with blanching and refill.  The pedicle was then wrapped with xeroform gauze and dressed appropriately with a telfa and gauze bandage to ensure continued blood supply and protect the attached pedicle.
Mastoid Interpolation Flap Text: A decision was made to reconstruct the defect utilizing an interpolation axial flap and a staged reconstruction.  A telfa template was made of the defect.  This telfa template was then used to outline the mastoid interpolation flap.  The donor area for the pedicle flap was then injected with anesthesia.  The flap was excised through the skin and subcutaneous tissue down to the layer of the underlying musculature.  The pedicle flap was carefully excised within this deep plane to maintain its blood supply.  The edges of the donor site were undermined.   The donor site was closed in a primary fashion.  The pedicle was then rotated into position and sutured.  Once the tube was sutured into place, adequate blood supply was confirmed with blanching and refill.  The pedicle was then wrapped with xeroform gauze and dressed appropriately with a telfa and gauze bandage to ensure continued blood supply and protect the attached pedicle.
Posterior Auricular Interpolation Flap Text: A decision was made to reconstruct the defect utilizing an interpolation axial flap and a staged reconstruction.  A telfa template was made of the defect.  This telfa template was then used to outline the posterior auricular interpolation flap.  The donor area for the pedicle flap was then injected with anesthesia.  The flap was excised through the skin and subcutaneous tissue down to the layer of the underlying musculature.  The pedicle flap was carefully excised within this deep plane to maintain its blood supply.  The edges of the donor site were undermined.   The donor site was closed in a primary fashion.  The pedicle was then rotated into position and sutured.  Once the tube was sutured into place, adequate blood supply was confirmed with blanching and refill.  The pedicle was then wrapped with xeroform gauze and dressed appropriately with a telfa and gauze bandage to ensure continued blood supply and protect the attached pedicle.
Paramedian Forehead Flap Text: A decision was made to reconstruct the defect utilizing an interpolation axial flap and a staged reconstruction.  A telfa template was made of the defect.  This telfa template was then used to outline the paramedian forehead pedicle flap.  The donor area for the pedicle flap was then injected with anesthesia.  The flap was excised through the skin and subcutaneous tissue down to the layer of the underlying musculature.  The pedicle flap was carefully excised within this deep plane to maintain its blood supply.  The edges of the donor site were undermined.   The donor site was closed in a primary fashion.  The pedicle was then rotated into position and sutured.  Once the tube was sutured into place, adequate blood supply was confirmed with blanching and refill.  The pedicle was then wrapped with xeroform gauze and dressed appropriately with a telfa and gauze bandage to ensure continued blood supply and protect the attached pedicle.
Lip Wedge Excision Repair Text: Given the location of the defect and the proximity to free margins a full thickness wedge repair was deemed most appropriate.  Using a sterile surgical marker, the appropriate repair was drawn incorporating the defect and placing the expected incisions perpendicular to the vermilion border.  The vermilion border was also meticulously outlined to ensure appropriate reapproximation during the repair.  The area thus outlined was incised through and through with a #15 scalpel blade.  The muscularis and dermis were reaproximated with deep sutures following hemostasis. Care was taken to realign the vermilion border before proceeding with the superficial closure.  Once the vermilion was realigned the superfical and mucosal closure was finished.
Ftsg Text: The defect edges were debeveled with a #15 scalpel blade.  Given the location of the defect, shape of the defect and the proximity to free margins a full thickness skin graft was deemed most appropriate.  Using a sterile surgical marker, the primary defect shape was transferred to the donor site. The area thus outlined was incised deep to adipose tissue with a #15 scalpel blade.  The harvested graft was then trimmed of adipose tissue until only dermis and epidermis was left.  The skin margins of the secondary defect were undermined to an appropriate distance in all directions utilizing iris scissors.  The secondary defect was closed with interrupted buried subcutaneous sutures.  The skin edges were then re-apposed with running  sutures.  The skin graft was then placed in the primary defect and oriented appropriately.
Split-Thickness Skin Graft Text: The defect edges were debeveled with a #15 scalpel blade.  Given the location of the defect, shape of the defect and the proximity to free margins a split thickness skin graft was deemed most appropriate.  Using a sterile surgical marker, the primary defect shape was transferred to the donor site. The split thickness graft was then harvested.  The skin graft was then placed in the primary defect and oriented appropriately.
Burow's Graft Text: The defect edges were debeveled with a #15 scalpel blade.  Given the location of the defect, shape of the defect, the proximity to free margins and the presence of a standing cone deformity a Burow's skin graft was deemed most appropriate. The standing cone was removed and this tissue was then trimmed to the shape of the primary defect. The adipose tissue was also removed until only dermis and epidermis were left.  The skin margins of the secondary defect were undermined to an appropriate distance in all directions utilizing iris scissors.  The secondary defect was closed with interrupted buried subcutaneous sutures.  The skin edges were then re-apposed with running  sutures.  The skin graft was then placed in the primary defect and oriented appropriately.
Cartilage Graft Text: The defect edges were debeveled with a #15 scalpel blade.  Given the location of the defect, shape of the defect, the fact the defect involved a full thickness cartilage defect a cartilage graft was deemed most appropriate.  An appropriate donor site was identified, cleansed, and anesthetized. The cartilage graft was then harvested and transferred to the recipient site, oriented appropriately and then sutured into place.  The secondary defect was then repaired using a primary closure.
Composite Graft Text: The defect edges were debeveled with a #15 scalpel blade.  Given the location of the defect, shape of the defect, the proximity to free margins and the fact the defect was full thickness a composite graft was deemed most appropriate.  The defect was outline and then transferred to the donor site.  A full thickness graft was then excised from the donor site. The graft was then placed in the primary defect, oriented appropriately and then sutured into place.  The secondary defect was then repaired using a primary closure.
Epidermal Autograft Text: The defect edges were debeveled with a #15 scalpel blade.  Given the location of the defect, shape of the defect and the proximity to free margins an epidermal autograft was deemed most appropriate.  Using a sterile surgical marker, the primary defect shape was transferred to the donor site. The epidermal graft was then harvested.  The skin graft was then placed in the primary defect and oriented appropriately.
Dermal Autograft Text: The defect edges were debeveled with a #15 scalpel blade.  Given the location of the defect, shape of the defect and the proximity to free margins a dermal autograft was deemed most appropriate.  Using a sterile surgical marker, the primary defect shape was transferred to the donor site. The area thus outlined was incised deep to adipose tissue with a #15 scalpel blade.  The harvested graft was then trimmed of adipose and epidermal tissue until only dermis was left.  The skin graft was then placed in the primary defect and oriented appropriately.
Skin Substitute Text: The defect edges were debeveled with a #15 scalpel blade.  Given the location of the defect, shape of the defect and the proximity to free margins a skin substitute graft was deemed most appropriate.  The graft material was trimmed to fit the size of the defect. The graft was then placed in the primary defect and oriented appropriately.
Tissue Cultured Epidermal Autograft Text: The defect edges were debeveled with a #15 scalpel blade.  Given the location of the defect, shape of the defect and the proximity to free margins a tissue cultured epidermal autograft was deemed most appropriate.  The graft was then trimmed to fit the size of the defect.  The graft was then placed in the primary defect and oriented appropriately.
Xenograft Text: The defect edges were debeveled with a #15 scalpel blade.  Given the location of the defect, shape of the defect and the proximity to free margins a xenograft was deemed most appropriate.  The graft was then trimmed to fit the size of the defect.  The graft was then placed in the primary defect and oriented appropriately.
Purse String (Intermediate) Text: Given the location of the defect and the characteristics of the surrounding skin a purse string intermediate closure was deemed most appropriate.  Undermining was performed circumfirentially around the surgical defect.  A purse string suture was then placed and tightened.
Purse String (Simple) Text: Given the location of the defect and the characteristics of the surrounding skin a purse string simple closure was deemed most appropriate.  Undermining was performed circumferentially around the surgical defect.  A purse string suture was then placed and tightened.
Partial Purse String (Intermediate) Text: Given the location of the defect and the characteristics of the surrounding skin an intermediate purse string closure was deemed most appropriate.  Undermining was performed circumferentially around the surgical defect.  A purse string suture was then placed and tightened. Wound tension of the circular defect prevented complete closure of the wound.
Partial Purse String (Simple) Text: Given the location of the defect and the characteristics of the surrounding skin a simple purse string closure was deemed most appropriate.  Undermining was performed circumferentially around the surgical defect.  A purse string suture was then placed and tightened. Wound tension of the circular defect prevented complete closure of the wound.
Complex Repair And Single Advancement Flap Text: The defect edges were debeveled with a #15 scalpel blade.  The primary defect was closed partially with a complex linear closure.  Given the location of the remaining defect, shape of the defect and the proximity to free margins a single advancement flap was deemed most appropriate for complete closure of the defect.  Using a sterile surgical marker, an appropriate advancement flap was drawn incorporating the defect and placing the expected incisions within the relaxed skin tension lines where possible.    The area thus outlined was incised deep to adipose tissue with a #15 scalpel blade.  The skin margins were undermined to an appropriate distance in all directions utilizing iris scissors.
Complex Repair And Double Advancement Flap Text: The defect edges were debeveled with a #15 scalpel blade.  The primary defect was closed partially with a complex linear closure.  Given the location of the remaining defect, shape of the defect and the proximity to free margins a double advancement flap was deemed most appropriate for complete closure of the defect.  Using a sterile surgical marker, an appropriate advancement flap was drawn incorporating the defect and placing the expected incisions within the relaxed skin tension lines where possible.    The area thus outlined was incised deep to adipose tissue with a #15 scalpel blade.  The skin margins were undermined to an appropriate distance in all directions utilizing iris scissors.
Complex Repair And Modified Advancement Flap Text: The defect edges were debeveled with a #15 scalpel blade.  The primary defect was closed partially with a complex linear closure.  Given the location of the remaining defect, shape of the defect and the proximity to free margins a modified advancement flap was deemed most appropriate for complete closure of the defect.  Using a sterile surgical marker, an appropriate advancement flap was drawn incorporating the defect and placing the expected incisions within the relaxed skin tension lines where possible.    The area thus outlined was incised deep to adipose tissue with a #15 scalpel blade.  The skin margins were undermined to an appropriate distance in all directions utilizing iris scissors.
Complex Repair And A-T Advancement Flap Text: The defect edges were debeveled with a #15 scalpel blade.  The primary defect was closed partially with a complex linear closure.  Given the location of the remaining defect, shape of the defect and the proximity to free margins an A-T advancement flap was deemed most appropriate for complete closure of the defect.  Using a sterile surgical marker, an appropriate advancement flap was drawn incorporating the defect and placing the expected incisions within the relaxed skin tension lines where possible.    The area thus outlined was incised deep to adipose tissue with a #15 scalpel blade.  The skin margins were undermined to an appropriate distance in all directions utilizing iris scissors.
Complex Repair And O-T Advancement Flap Text: The defect edges were debeveled with a #15 scalpel blade.  The primary defect was closed partially with a complex linear closure.  Given the location of the remaining defect, shape of the defect and the proximity to free margins an O-T advancement flap was deemed most appropriate for complete closure of the defect.  Using a sterile surgical marker, an appropriate advancement flap was drawn incorporating the defect and placing the expected incisions within the relaxed skin tension lines where possible.    The area thus outlined was incised deep to adipose tissue with a #15 scalpel blade.  The skin margins were undermined to an appropriate distance in all directions utilizing iris scissors.
Complex Repair And O-L Flap Text: The defect edges were debeveled with a #15 scalpel blade.  The primary defect was closed partially with a complex linear closure.  Given the location of the remaining defect, shape of the defect and the proximity to free margins an O-L flap was deemed most appropriate for complete closure of the defect.  Using a sterile surgical marker, an appropriate flap was drawn incorporating the defect and placing the expected incisions within the relaxed skin tension lines where possible.    The area thus outlined was incised deep to adipose tissue with a #15 scalpel blade.  The skin margins were undermined to an appropriate distance in all directions utilizing iris scissors.
Complex Repair And Bilobe Flap Text: The defect edges were debeveled with a #15 scalpel blade.  The primary defect was closed partially with a complex linear closure.  Given the location of the remaining defect, shape of the defect and the proximity to free margins a bilobe flap was deemed most appropriate for complete closure of the defect.  Using a sterile surgical marker, an appropriate advancement flap was drawn incorporating the defect and placing the expected incisions within the relaxed skin tension lines where possible.    The area thus outlined was incised deep to adipose tissue with a #15 scalpel blade.  The skin margins were undermined to an appropriate distance in all directions utilizing iris scissors.
Complex Repair And Melolabial Flap Text: The defect edges were debeveled with a #15 scalpel blade.  The primary defect was closed partially with a complex linear closure.  Given the location of the remaining defect, shape of the defect and the proximity to free margins a melolabial flap was deemed most appropriate for complete closure of the defect.  Using a sterile surgical marker, an appropriate advancement flap was drawn incorporating the defect and placing the expected incisions within the relaxed skin tension lines where possible.    The area thus outlined was incised deep to adipose tissue with a #15 scalpel blade.  The skin margins were undermined to an appropriate distance in all directions utilizing iris scissors.
Complex Repair And Rotation Flap Text: The defect edges were debeveled with a #15 scalpel blade.  The primary defect was closed partially with a complex linear closure.  Given the location of the remaining defect, shape of the defect and the proximity to free margins a rotation flap was deemed most appropriate for complete closure of the defect.  Using a sterile surgical marker, an appropriate advancement flap was drawn incorporating the defect and placing the expected incisions within the relaxed skin tension lines where possible.    The area thus outlined was incised deep to adipose tissue with a #15 scalpel blade.  The skin margins were undermined to an appropriate distance in all directions utilizing iris scissors.
Complex Repair And Rhombic Flap Text: The defect edges were debeveled with a #15 scalpel blade.  The primary defect was closed partially with a complex linear closure.  Given the location of the remaining defect, shape of the defect and the proximity to free margins a rhombic flap was deemed most appropriate for complete closure of the defect.  Using a sterile surgical marker, an appropriate advancement flap was drawn incorporating the defect and placing the expected incisions within the relaxed skin tension lines where possible.    The area thus outlined was incised deep to adipose tissue with a #15 scalpel blade.  The skin margins were undermined to an appropriate distance in all directions utilizing iris scissors.
Complex Repair And Transposition Flap Text: The defect edges were debeveled with a #15 scalpel blade.  The primary defect was closed partially with a complex linear closure.  Given the location of the remaining defect, shape of the defect and the proximity to free margins a transposition flap was deemed most appropriate for complete closure of the defect.  Using a sterile surgical marker, an appropriate advancement flap was drawn incorporating the defect and placing the expected incisions within the relaxed skin tension lines where possible.    The area thus outlined was incised deep to adipose tissue with a #15 scalpel blade.  The skin margins were undermined to an appropriate distance in all directions utilizing iris scissors.
Complex Repair And V-Y Plasty Text: The defect edges were debeveled with a #15 scalpel blade.  The primary defect was closed partially with a complex linear closure.  Given the location of the remaining defect, shape of the defect and the proximity to free margins a V-Y plasty was deemed most appropriate for complete closure of the defect.  Using a sterile surgical marker, an appropriate advancement flap was drawn incorporating the defect and placing the expected incisions within the relaxed skin tension lines where possible.    The area thus outlined was incised deep to adipose tissue with a #15 scalpel blade.  The skin margins were undermined to an appropriate distance in all directions utilizing iris scissors.
Complex Repair And M Plasty Text: The defect edges were debeveled with a #15 scalpel blade.  The primary defect was closed partially with a complex linear closure.  Given the location of the remaining defect, shape of the defect and the proximity to free margins an M plasty was deemed most appropriate for complete closure of the defect.  Using a sterile surgical marker, an appropriate advancement flap was drawn incorporating the defect and placing the expected incisions within the relaxed skin tension lines where possible.    The area thus outlined was incised deep to adipose tissue with a #15 scalpel blade.  The skin margins were undermined to an appropriate distance in all directions utilizing iris scissors.
Complex Repair And Double M Plasty Text: The defect edges were debeveled with a #15 scalpel blade.  The primary defect was closed partially with a complex linear closure.  Given the location of the remaining defect, shape of the defect and the proximity to free margins a double M plasty was deemed most appropriate for complete closure of the defect.  Using a sterile surgical marker, an appropriate advancement flap was drawn incorporating the defect and placing the expected incisions within the relaxed skin tension lines where possible.    The area thus outlined was incised deep to adipose tissue with a #15 scalpel blade.  The skin margins were undermined to an appropriate distance in all directions utilizing iris scissors.
Complex Repair And W Plasty Text: The defect edges were debeveled with a #15 scalpel blade.  The primary defect was closed partially with a complex linear closure.  Given the location of the remaining defect, shape of the defect and the proximity to free margins a W plasty was deemed most appropriate for complete closure of the defect.  Using a sterile surgical marker, an appropriate advancement flap was drawn incorporating the defect and placing the expected incisions within the relaxed skin tension lines where possible.    The area thus outlined was incised deep to adipose tissue with a #15 scalpel blade.  The skin margins were undermined to an appropriate distance in all directions utilizing iris scissors.
Complex Repair And Z Plasty Text: The defect edges were debeveled with a #15 scalpel blade.  The primary defect was closed partially with a complex linear closure.  Given the location of the remaining defect, shape of the defect and the proximity to free margins a Z plasty was deemed most appropriate for complete closure of the defect.  Using a sterile surgical marker, an appropriate advancement flap was drawn incorporating the defect and placing the expected incisions within the relaxed skin tension lines where possible.    The area thus outlined was incised deep to adipose tissue with a #15 scalpel blade.  The skin margins were undermined to an appropriate distance in all directions utilizing iris scissors.
Complex Repair And Dorsal Nasal Flap Text: The defect edges were debeveled with a #15 scalpel blade.  The primary defect was closed partially with a complex linear closure.  Given the location of the remaining defect, shape of the defect and the proximity to free margins a dorsal nasal flap was deemed most appropriate for complete closure of the defect.  Using a sterile surgical marker, an appropriate flap was drawn incorporating the defect and placing the expected incisions within the relaxed skin tension lines where possible.    The area thus outlined was incised deep to adipose tissue with a #15 scalpel blade.  The skin margins were undermined to an appropriate distance in all directions utilizing iris scissors.
Complex Repair And Ftsg Text: The defect edges were debeveled with a #15 scalpel blade.  The primary defect was closed partially with a complex linear closure.  Given the location of the defect, shape of the defect and the proximity to free margins a full thickness skin graft was deemed most appropriate to repair the remaining defect.  The graft was trimmed to fit the size of the remaining defect.  The graft was then placed in the primary defect, oriented appropriately, and sutured into place.
Complex Repair And Burow's Graft Text: The defect edges were debeveled with a #15 scalpel blade.  The primary defect was closed partially with a complex linear closure.  Given the location of the defect, shape of the defect, the proximity to free margins and the presence of a standing cone deformity a Burow's graft was deemed most appropriate to repair the remaining defect.  The graft was trimmed to fit the size of the remaining defect.  The graft was then placed in the primary defect, oriented appropriately, and sutured into place.
Complex Repair And Split-Thickness Skin Graft Text: The defect edges were debeveled with a #15 scalpel blade.  The primary defect was closed partially with a complex linear closure.  Given the location of the defect, shape of the defect and the proximity to free margins a split thickness skin graft was deemed most appropriate to repair the remaining defect.  The graft was trimmed to fit the size of the remaining defect.  The graft was then placed in the primary defect, oriented appropriately, and sutured into place.
Complex Repair And Epidermal Autograft Text: The defect edges were debeveled with a #15 scalpel blade.  The primary defect was closed partially with a complex linear closure.  Given the location of the defect, shape of the defect and the proximity to free margins an epidermal autograft was deemed most appropriate to repair the remaining defect.  The graft was trimmed to fit the size of the remaining defect.  The graft was then placed in the primary defect, oriented appropriately, and sutured into place.
Complex Repair And Dermal Autograft Text: The defect edges were debeveled with a #15 scalpel blade.  The primary defect was closed partially with a complex linear closure.  Given the location of the defect, shape of the defect and the proximity to free margins an dermal autograft was deemed most appropriate to repair the remaining defect.  The graft was trimmed to fit the size of the remaining defect.  The graft was then placed in the primary defect, oriented appropriately, and sutured into place.
Complex Repair And Tissue Cultured Epidermal Autograft Text: The defect edges were debeveled with a #15 scalpel blade.  The primary defect was closed partially with a complex linear closure.  Given the location of the defect, shape of the defect and the proximity to free margins an tissue cultured epidermal autograft was deemed most appropriate to repair the remaining defect.  The graft was trimmed to fit the size of the remaining defect.  The graft was then placed in the primary defect, oriented appropriately, and sutured into place.
Complex Repair And Xenograft Text: The defect edges were debeveled with a #15 scalpel blade.  The primary defect was closed partially with a complex linear closure.  Given the location of the defect, shape of the defect and the proximity to free margins a xenograft was deemed most appropriate to repair the remaining defect.  The graft was trimmed to fit the size of the remaining defect.  The graft was then placed in the primary defect, oriented appropriately, and sutured into place.
Complex Repair And Skin Substitute Graft Text: The defect edges were debeveled with a #15 scalpel blade.  The primary defect was closed partially with a complex linear closure.  Given the location of the remaining defect, shape of the defect and the proximity to free margins a skin substitute graft was deemed most appropriate to repair the remaining defect.  The graft was trimmed to fit the size of the remaining defect.  The graft was then placed in the primary defect, oriented appropriately, and sutured into place.
Path Notes (To The Dermatopathologist): Please check margins.
Consent was obtained from the patient. The risks and benefits to therapy were discussed in detail. Specifically, the risks of infection, scarring, bleeding, prolonged wound healing, incomplete removal, allergy to anesthesia, nerve injury and recurrence were addressed. Prior to the procedure, the treatment site was clearly identified and confirmed by the patient. All components of Universal Protocol/PAUSE Rule completed.
Post-Care Instructions: I reviewed with the patient in detail post-care instructions. Patient is not to engage in any heavy lifting, exercise, or swimming for the next 14 days. Should the patient develop any fevers, chills, bleeding, severe pain patient will contact the office immediately.
Home Suture Removal Text: Patient was provided a home suture removal kit and will remove their sutures at home.  If they have any questions or difficulties they will call the office.
Where Do You Want The Question To Include Opioid Counseling Located?: Case Summary Tab
Information: Selecting Yes will display possible errors in your note based on the variables you have selected. This validation is only offered as a suggestion for you. PLEASE NOTE THAT THE VALIDATION TEXT WILL BE REMOVED WHEN YOU FINALIZE YOUR NOTE. IF YOU WANT TO FAX A PRELIMINARY NOTE YOU WILL NEED TO TOGGLE THIS TO 'NO' IF YOU DO NOT WANT IT IN YOUR FAXED NOTE.
Medical Necessity Information: It is in your best interest to select a reason for this procedure from the list below. All of these items fulfill various CMS LCD requirements except lesion extends to a margin.
Medical Necessity Clause: This procedure was medically necessary because the lesion that was treated was:
Size Of Lesion In Cm: 0.3
Size Of Margin In Cm: 0.2
Excision Method: Saucerization
Repair Type: None
Anesthesia Type: 1% lidocaine with epinephrine
Hemostasis: Electrocautery
Wound Care: Petrolatum
Dressing: dry sterile dressing
Mucosal Advancement Flap Text: Given the location of the defect, shape of the defect and the proximity to free margins a mucosal advancement flap was deemed most appropriate. Incisions were made with a 15 blade scalpel in the appropriate fashion along the cutaneous vermillion border and the mucosal lip. The remaining actinically damaged mucosal tissue was excised.  The mucosal advancement flap was then elevated to the gingival sulcus with care taken to preserve the neurovascular structures and advanced into the primary defect. Care was taken to ensure that precise realignment of the vermilion border was achieved.
Orbicularis Oris Muscle Flap Text: The defect edges were debeveled with a #15 scalpel blade.  Given that the defect affected the competency of the oral sphincter an orbicularis oris muscle flap was deemed most appropriate to restore this competency and normal muscle function.  Using a sterile surgical marker, an appropriate flap was drawn incorporating the defect. The area thus outlined was incised with a #15 scalpel blade.
Purse String (Intermediate) Text: Given the location of the defect and the characteristics of the surrounding skin a purse string intermediate closure was deemed most appropriate.  Undermining was performed circumferentially around the surgical defect.  A purse string suture was then placed and tightened.

## 2022-08-04 ENCOUNTER — OFFICE VISIT (OUTPATIENT)
Dept: MEDICAL GROUP | Facility: MEDICAL CENTER | Age: 57
End: 2022-08-04
Payer: COMMERCIAL

## 2022-08-04 VITALS
RESPIRATION RATE: 16 BRPM | OXYGEN SATURATION: 97 % | DIASTOLIC BLOOD PRESSURE: 60 MMHG | SYSTOLIC BLOOD PRESSURE: 108 MMHG | HEART RATE: 60 BPM | WEIGHT: 121.25 LBS | TEMPERATURE: 98.3 F | HEIGHT: 65 IN | BODY MASS INDEX: 20.2 KG/M2

## 2022-08-04 DIAGNOSIS — Z13.1 SCREENING FOR DIABETES MELLITUS (DM): ICD-10-CM

## 2022-08-04 DIAGNOSIS — M54.2 NECK PAIN: ICD-10-CM

## 2022-08-04 DIAGNOSIS — Z13.6 SCREENING FOR ISCHEMIC HEART DISEASE: ICD-10-CM
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
## 2022-08-04 DIAGNOSIS — M79.673 PAIN OF FOOT, UNSPECIFIED LATERALITY: ICD-10-CM

## 2022-08-04 DIAGNOSIS — Z12.11 SCREENING FOR COLON CANCER: ICD-10-CM

## 2022-08-04 DIAGNOSIS — Z00.00 PREVENTATIVE HEALTH CARE: ICD-10-CM

## 2022-08-04 PROBLEM — E78.5 DYSLIPIDEMIA: Status: RESOLVED | Noted: 2018-08-23 | Resolved: 2022-08-04

## 2022-08-04 PROBLEM — M77.8 EXTENSOR CARPI ULNARIS TENDINITIS: Status: RESOLVED | Noted: 2021-11-19 | Resolved: 2022-08-04

## 2022-08-04 PROBLEM — F41.9 ANXIETY: Status: RESOLVED | Noted: 2018-08-23 | Resolved: 2022-08-04
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
## 2022-08-04 PROBLEM — L57.0 ACTINIC KERATOSES: Status: RESOLVED | Noted: 2020-03-02 | Resolved: 2022-08-04

## 2022-08-04 PROBLEM — M25.532 LEFT WRIST PAIN: Status: RESOLVED | Noted: 2021-11-19 | Resolved: 2022-08-04

## 2022-08-04 PROBLEM — M54.50 ACUTE RIGHT-SIDED LOW BACK PAIN WITHOUT SCIATICA: Status: RESOLVED | Noted: 2020-02-11 | Resolved: 2022-08-04

## 2022-08-04 PROBLEM — R19.7 DIARRHEA: Status: RESOLVED | Noted: 2019-12-10 | Resolved: 2022-08-04

## 2022-08-04 PROBLEM — N95.1 PERIMENOPAUSE: Status: RESOLVED | Noted: 2019-12-10 | Resolved: 2022-08-04

## 2022-08-04 PROBLEM — D48.5 NEOPLASM OF UNCERTAIN BEHAVIOR OF SKIN: Status: RESOLVED | Noted: 2020-03-02 | Resolved: 2022-08-04

## 2022-08-04 PROBLEM — L91.8 ACROCHORDON: Status: RESOLVED | Noted: 2020-03-02 | Resolved: 2022-08-04

## 2022-08-04 PROCEDURE — 99213 OFFICE O/P EST LOW 20 MIN: CPT | Performed by: FAMILY MEDICINE

## 2022-08-04 RX ORDER — LORAZEPAM 1 MG/1
1 TABLET ORAL EVERY 8 HOURS PRN
Qty: 30 TABLET | Refills: 2 | Status: SHIPPED | OUTPATIENT
Start: 2022-08-04 | End: 2022-09-03

## 2022-08-04 NOTE — ASSESSMENT & PLAN NOTE
Doing stretches regularly   Takes ativan occasionally   Risk benefit   Consider physiatry consult in the future with x rays prior

## 2022-08-15 ENCOUNTER — APPOINTMENT (RX ONLY)
Dept: URBAN - METROPOLITAN AREA CLINIC 6 | Facility: CLINIC | Age: 57
Setting detail: DERMATOLOGY
End: 2022-08-15

## 2022-08-15 DIAGNOSIS — L82.0 INFLAMED SEBORRHEIC KERATOSIS: ICD-10-CM

## 2022-08-15 DIAGNOSIS — Z85.828 PERSONAL HISTORY OF OTHER MALIGNANT NEOPLASM OF SKIN: ICD-10-CM

## 2022-08-15 DIAGNOSIS — D22 MELANOCYTIC NEVI: ICD-10-CM

## 2022-08-15 DIAGNOSIS — D18.0 HEMANGIOMA: ICD-10-CM

## 2022-08-15 DIAGNOSIS — L82.1 OTHER SEBORRHEIC KERATOSIS: ICD-10-CM

## 2022-08-15 DIAGNOSIS — Z71.89 OTHER SPECIFIED COUNSELING: ICD-10-CM

## 2022-08-15 DIAGNOSIS — L81.4 OTHER MELANIN HYPERPIGMENTATION: ICD-10-CM

## 2022-08-15 DIAGNOSIS — L30.0 NUMMULAR DERMATITIS: ICD-10-CM

## 2022-08-15 DIAGNOSIS — Z87.2 PERSONAL HISTORY OF DISEASES OF THE SKIN AND SUBCUTANEOUS TISSUE: ICD-10-CM

## 2022-08-15 PROBLEM — D18.01 HEMANGIOMA OF SKIN AND SUBCUTANEOUS TISSUE: Status: ACTIVE | Noted: 2022-08-15

## 2022-08-15 PROBLEM — D22.5 MELANOCYTIC NEVI OF TRUNK: Status: ACTIVE | Noted: 2022-08-15

## 2022-08-15 PROCEDURE — 17110 DESTRUCTION B9 LES UP TO 14: CPT

## 2022-08-15 PROCEDURE — ? COUNSELING

## 2022-08-15 PROCEDURE — ? SUNSCREEN RECOMMENDATIONS

## 2022-08-15 PROCEDURE — ? PRESCRIPTION

## 2022-08-15 PROCEDURE — ? LIQUID NITROGEN

## 2022-08-15 PROCEDURE — ? SUNSCREEN TREATMENT REGIMEN

## 2022-08-15 PROCEDURE — 99213 OFFICE O/P EST LOW 20 MIN: CPT | Mod: 25

## 2022-08-15 RX ORDER — CLOBETASOL PROPIONATE 0.5 MG/G
1 OINTMENT TOPICAL BID
Qty: 45 | Refills: 3 | Status: ERX | COMMUNITY
Start: 2022-08-15

## 2022-08-15 RX ADMIN — CLOBETASOL PROPIONATE 1: 0.5 OINTMENT TOPICAL at 00:00

## 2022-08-15 ASSESSMENT — LOCATION ZONE DERM
LOCATION ZONE: HAND
LOCATION ZONE: TRUNK
LOCATION ZONE: LEG
LOCATION ZONE: ARM
LOCATION ZONE: FEET

## 2022-08-15 ASSESSMENT — LOCATION DETAILED DESCRIPTION DERM
LOCATION DETAILED: LEFT RADIAL DORSAL HAND
LOCATION DETAILED: RIGHT MID-UPPER BACK
LOCATION DETAILED: RIGHT LATERAL SUPERIOR CHEST
LOCATION DETAILED: LEFT PROXIMAL DORSAL FOREARM
LOCATION DETAILED: RIGHT LATERAL DISTAL CALF
LOCATION DETAILED: EPIGASTRIC SKIN
LOCATION DETAILED: MIDDLE STERNUM
LOCATION DETAILED: RIGHT SUPERIOR UPPER BACK
LOCATION DETAILED: RIGHT LATERAL UPPER BACK
LOCATION DETAILED: RIGHT RADIAL DORSAL HAND
LOCATION DETAILED: LEFT SUPERIOR UPPER BACK
LOCATION DETAILED: RIGHT PROXIMAL PRETIBIAL REGION
LOCATION DETAILED: RIGHT PROXIMAL LATERAL PRETIBIAL REGION
LOCATION DETAILED: RIGHT LATERAL PROXIMAL UPPER ARM
LOCATION DETAILED: SUBXIPHOID
LOCATION DETAILED: LEFT LATERAL DORSAL FOOT
LOCATION DETAILED: RIGHT DISTAL LATERAL PRETIBIAL REGION
LOCATION DETAILED: RIGHT SUPERIOR MEDIAL UPPER BACK

## 2022-08-15 ASSESSMENT — LOCATION SIMPLE DESCRIPTION DERM
LOCATION SIMPLE: RIGHT UPPER BACK
LOCATION SIMPLE: RIGHT LOWER LEG
LOCATION SIMPLE: LEFT FOREARM
LOCATION SIMPLE: RIGHT UPPER ARM
LOCATION SIMPLE: CHEST
LOCATION SIMPLE: ABDOMEN
LOCATION SIMPLE: LEFT HAND
LOCATION SIMPLE: LEFT UPPER BACK
LOCATION SIMPLE: RIGHT PRETIBIAL REGION
LOCATION SIMPLE: LEFT FOOT
LOCATION SIMPLE: RIGHT HAND

## 2022-08-15 NOTE — PROCEDURE: LIQUID NITROGEN
Render Note In Bullet Format When Appropriate: No
Medical Necessity Clause: This procedure was medically necessary because the lesions that were treated were:
Show Aperture Variable?: Yes
Number Of Freeze-Thaw Cycles: 3 freeze-thaw cycles
Detail Level: Detailed
Post-Care Instructions: I reviewed with the patient in detail post-care instructions. Patient is to wear sunprotection, and avoid picking at any of the treated lesions. Pt may apply Vaseline to crusted or scabbing areas.
Duration Of Freeze Thaw-Cycle (Seconds): 10-15
Spray Paint Text: The liquid nitrogen was applied to the skin utilizing a spray paint frosting technique.
Medical Necessity Information: It is in your best interest to select a reason for this procedure from the list below. All of these items fulfill various CMS LCD requirements except the new and changing color options.
Consent: The patient's consent was obtained including but not limited to risks of crusting, scabbing, blistering, scarring, darker or lighter pigmentary change, recurrence, incomplete removal and infection.

## 2022-08-22 ENCOUNTER — HOSPITAL ENCOUNTER (OUTPATIENT)
Dept: LAB | Facility: MEDICAL CENTER | Age: 57
End: 2022-08-22
Attending: FAMILY MEDICINE
Payer: COMMERCIAL

## 2022-08-22 DIAGNOSIS — Z13.6 SCREENING FOR ISCHEMIC HEART DISEASE: ICD-10-CM

## 2022-08-22 DIAGNOSIS — Z13.1 SCREENING FOR DIABETES MELLITUS (DM): ICD-10-CM

## 2022-08-22 LAB
ANION GAP SERPL CALC-SCNC: 12 MMOL/L (ref 7–16)
BUN SERPL-MCNC: 10 MG/DL (ref 8–22)
CALCIUM SERPL-MCNC: 9.6 MG/DL (ref 8.5–10.5)
CHLORIDE SERPL-SCNC: 103 MMOL/L (ref 96–112)
CHOLEST SERPL-MCNC: 238 MG/DL (ref 100–199)
CO2 SERPL-SCNC: 23 MMOL/L (ref 20–33)
CREAT SERPL-MCNC: 0.74 MG/DL (ref 0.5–1.4)
FASTING STATUS PATIENT QL REPORTED: NORMAL
GFR SERPLBLD CREATININE-BSD FMLA CKD-EPI: 93 ML/MIN/1.73 M 2
GLUCOSE SERPL-MCNC: 93 MG/DL (ref 65–99)
HDLC SERPL-MCNC: 94 MG/DL
LDLC SERPL CALC-MCNC: 129 MG/DL
POTASSIUM SERPL-SCNC: 4.2 MMOL/L (ref 3.6–5.5)
SODIUM SERPL-SCNC: 138 MMOL/L (ref 135–145)
TRIGL SERPL-MCNC: 75 MG/DL (ref 0–149)

## 2022-08-22 PROCEDURE — 80048 BASIC METABOLIC PNL TOTAL CA: CPT

## 2022-08-22 PROCEDURE — 36415 COLL VENOUS BLD VENIPUNCTURE: CPT

## 2022-08-22 PROCEDURE — 80061 LIPID PANEL: CPT

## 2022-10-11 ENCOUNTER — IMMUNIZATION (OUTPATIENT)
Dept: OCCUPATIONAL MEDICINE | Facility: CLINIC | Age: 57
End: 2022-10-11

## 2022-10-11 DIAGNOSIS — Z23 NEED FOR VACCINATION: Primary | ICD-10-CM

## 2022-10-11 PROCEDURE — 90686 IIV4 VACC NO PRSV 0.5 ML IM: CPT | Performed by: PREVENTIVE MEDICINE

## 2022-11-11 ENCOUNTER — HOSPITAL ENCOUNTER (EMERGENCY)
Facility: MEDICAL CENTER | Age: 57
End: 2022-11-11
Attending: EMERGENCY MEDICINE
Payer: COMMERCIAL

## 2022-11-11 ENCOUNTER — HOSPITAL ENCOUNTER (OUTPATIENT)
Dept: RADIOLOGY | Facility: MEDICAL CENTER | Age: 57
End: 2022-11-11
Attending: INTERNAL MEDICINE
Payer: COMMERCIAL

## 2022-11-11 ENCOUNTER — APPOINTMENT (OUTPATIENT)
Dept: RADIOLOGY | Facility: MEDICAL CENTER | Age: 57
End: 2022-11-11
Attending: EMERGENCY MEDICINE
Payer: COMMERCIAL

## 2022-11-11 ENCOUNTER — HOSPITAL ENCOUNTER (OUTPATIENT)
Dept: LAB | Facility: MEDICAL CENTER | Age: 57
End: 2022-11-11
Attending: INTERNAL MEDICINE
Payer: COMMERCIAL

## 2022-11-11 VITALS
BODY MASS INDEX: 20.68 KG/M2 | DIASTOLIC BLOOD PRESSURE: 58 MMHG | OXYGEN SATURATION: 94 % | HEART RATE: 69 BPM | WEIGHT: 124.12 LBS | TEMPERATURE: 97.8 F | RESPIRATION RATE: 16 BRPM | SYSTOLIC BLOOD PRESSURE: 122 MMHG | HEIGHT: 65 IN

## 2022-11-11 DIAGNOSIS — R10.13 ABDOMINAL PAIN, EPIGASTRIC: ICD-10-CM

## 2022-11-11 DIAGNOSIS — R14.0 GASEOUS ABDOMINAL DISTENTION: ICD-10-CM

## 2022-11-11 LAB
ALBUMIN SERPL BCP-MCNC: 4.1 G/DL (ref 3.2–4.9)
ALBUMIN SERPL BCP-MCNC: 5.1 G/DL (ref 3.2–4.9)
ALBUMIN/GLOB SERPL: 1.6 G/DL
ALBUMIN/GLOB SERPL: 1.6 G/DL
ALP SERPL-CCNC: 58 U/L (ref 30–99)
ALP SERPL-CCNC: 69 U/L (ref 30–99)
ALT SERPL-CCNC: 16 U/L (ref 2–50)
ALT SERPL-CCNC: 17 U/L (ref 2–50)
ANION GAP SERPL CALC-SCNC: 14 MMOL/L (ref 7–16)
ANION GAP SERPL CALC-SCNC: 9 MMOL/L (ref 7–16)
AST SERPL-CCNC: 16 U/L (ref 12–45)
AST SERPL-CCNC: 23 U/L (ref 12–45)
BASOPHILS # BLD AUTO: 0.6 % (ref 0–1.8)
BASOPHILS # BLD AUTO: 0.8 % (ref 0–1.8)
BASOPHILS # BLD: 0.03 K/UL (ref 0–0.12)
BASOPHILS # BLD: 0.05 K/UL (ref 0–0.12)
BILIRUB SERPL-MCNC: 0.5 MG/DL (ref 0.1–1.5)
BILIRUB SERPL-MCNC: 0.6 MG/DL (ref 0.1–1.5)
BUN SERPL-MCNC: 10 MG/DL (ref 8–22)
BUN SERPL-MCNC: 10 MG/DL (ref 8–22)
CALCIUM SERPL-MCNC: 10.1 MG/DL (ref 8.5–10.5)
CALCIUM SERPL-MCNC: 8.9 MG/DL (ref 8.5–10.5)
CHLORIDE SERPL-SCNC: 102 MMOL/L (ref 96–112)
CHLORIDE SERPL-SCNC: 108 MMOL/L (ref 96–112)
CO2 SERPL-SCNC: 24 MMOL/L (ref 20–33)
CO2 SERPL-SCNC: 25 MMOL/L (ref 20–33)
CREAT SERPL-MCNC: 0.71 MG/DL (ref 0.5–1.4)
CREAT SERPL-MCNC: 0.78 MG/DL (ref 0.5–1.4)
EOSINOPHIL # BLD AUTO: 0.02 K/UL (ref 0–0.51)
EOSINOPHIL # BLD AUTO: 0.03 K/UL (ref 0–0.51)
EOSINOPHIL NFR BLD: 0.4 % (ref 0–6.9)
EOSINOPHIL NFR BLD: 0.5 % (ref 0–6.9)
ERYTHROCYTE [DISTWIDTH] IN BLOOD BY AUTOMATED COUNT: 45.4 FL (ref 35.9–50)
ERYTHROCYTE [DISTWIDTH] IN BLOOD BY AUTOMATED COUNT: 46.8 FL (ref 35.9–50)
GFR SERPLBLD CREATININE-BSD FMLA CKD-EPI: 87 ML/MIN/1.73 M 2
GFR SERPLBLD CREATININE-BSD FMLA CKD-EPI: 97 ML/MIN/1.73 M 2
GLOBULIN SER CALC-MCNC: 2.5 G/DL (ref 1.9–3.5)
GLOBULIN SER CALC-MCNC: 3.1 G/DL (ref 1.9–3.5)
GLUCOSE SERPL-MCNC: 87 MG/DL (ref 65–99)
GLUCOSE SERPL-MCNC: 96 MG/DL (ref 65–99)
HCT VFR BLD AUTO: 39.5 % (ref 37–47)
HCT VFR BLD AUTO: 47.1 % (ref 37–47)
HGB BLD-MCNC: 13 G/DL (ref 12–16)
HGB BLD-MCNC: 15.2 G/DL (ref 12–16)
IMM GRANULOCYTES # BLD AUTO: 0.01 K/UL (ref 0–0.11)
IMM GRANULOCYTES # BLD AUTO: 0.02 K/UL (ref 0–0.11)
IMM GRANULOCYTES NFR BLD AUTO: 0.2 % (ref 0–0.9)
IMM GRANULOCYTES NFR BLD AUTO: 0.4 % (ref 0–0.9)
LACTATE SERPL-SCNC: 1.1 MMOL/L (ref 0.5–2)
LYMPHOCYTES # BLD AUTO: 1.59 K/UL (ref 1–4.8)
LYMPHOCYTES # BLD AUTO: 2.22 K/UL (ref 1–4.8)
LYMPHOCYTES NFR BLD: 30.1 % (ref 22–41)
LYMPHOCYTES NFR BLD: 35.6 % (ref 22–41)
MCH RBC QN AUTO: 30.8 PG (ref 27–33)
MCH RBC QN AUTO: 31 PG (ref 27–33)
MCHC RBC AUTO-ENTMCNC: 32.3 G/DL (ref 33.6–35)
MCHC RBC AUTO-ENTMCNC: 32.9 G/DL (ref 33.6–35)
MCV RBC AUTO: 94 FL (ref 81.4–97.8)
MCV RBC AUTO: 95.5 FL (ref 81.4–97.8)
MONOCYTES # BLD AUTO: 0.36 K/UL (ref 0–0.85)
MONOCYTES # BLD AUTO: 0.51 K/UL (ref 0–0.85)
MONOCYTES NFR BLD AUTO: 6.8 % (ref 0–13.4)
MONOCYTES NFR BLD AUTO: 8.2 % (ref 0–13.4)
NEUTROPHILS # BLD AUTO: 3.27 K/UL (ref 2–7.15)
NEUTROPHILS # BLD AUTO: 3.42 K/UL (ref 2–7.15)
NEUTROPHILS NFR BLD: 54.7 % (ref 44–72)
NEUTROPHILS NFR BLD: 61.7 % (ref 44–72)
NRBC # BLD AUTO: 0 K/UL
NRBC # BLD AUTO: 0 K/UL
NRBC BLD-RTO: 0 /100 WBC
NRBC BLD-RTO: 0 /100 WBC
PLATELET # BLD AUTO: 254 K/UL (ref 164–446)
PLATELET # BLD AUTO: 332 K/UL (ref 164–446)
PMV BLD AUTO: 10.3 FL (ref 9–12.9)
PMV BLD AUTO: 10.9 FL (ref 9–12.9)
POTASSIUM SERPL-SCNC: 3.8 MMOL/L (ref 3.6–5.5)
POTASSIUM SERPL-SCNC: 4 MMOL/L (ref 3.6–5.5)
PROT SERPL-MCNC: 6.6 G/DL (ref 6–8.2)
PROT SERPL-MCNC: 8.2 G/DL (ref 6–8.2)
RBC # BLD AUTO: 4.2 M/UL (ref 4.2–5.4)
RBC # BLD AUTO: 4.93 M/UL (ref 4.2–5.4)
SODIUM SERPL-SCNC: 140 MMOL/L (ref 135–145)
SODIUM SERPL-SCNC: 142 MMOL/L (ref 135–145)
WBC # BLD AUTO: 5.3 K/UL (ref 4.8–10.8)
WBC # BLD AUTO: 6.2 K/UL (ref 4.8–10.8)

## 2022-11-11 PROCEDURE — 36415 COLL VENOUS BLD VENIPUNCTURE: CPT

## 2022-11-11 PROCEDURE — 74177 CT ABD & PELVIS W/CONTRAST: CPT

## 2022-11-11 PROCEDURE — 74018 RADEX ABDOMEN 1 VIEW: CPT

## 2022-11-11 PROCEDURE — 80053 COMPREHEN METABOLIC PANEL: CPT | Mod: 91

## 2022-11-11 PROCEDURE — 83605 ASSAY OF LACTIC ACID: CPT

## 2022-11-11 PROCEDURE — A9270 NON-COVERED ITEM OR SERVICE: HCPCS | Performed by: EMERGENCY MEDICINE

## 2022-11-11 PROCEDURE — 80053 COMPREHEN METABOLIC PANEL: CPT

## 2022-11-11 PROCEDURE — 85025 COMPLETE CBC W/AUTO DIFF WBC: CPT | Mod: 91

## 2022-11-11 PROCEDURE — 700117 HCHG RX CONTRAST REV CODE 255: Performed by: EMERGENCY MEDICINE

## 2022-11-11 PROCEDURE — 99284 EMERGENCY DEPT VISIT MOD MDM: CPT

## 2022-11-11 PROCEDURE — 700102 HCHG RX REV CODE 250 W/ 637 OVERRIDE(OP): Performed by: EMERGENCY MEDICINE

## 2022-11-11 PROCEDURE — 85025 COMPLETE CBC W/AUTO DIFF WBC: CPT

## 2022-11-11 RX ORDER — LORAZEPAM 1 MG/1
0.5 TABLET ORAL EVERY 4 HOURS PRN
Status: SHIPPED | COMMUNITY
End: 2023-09-24 | Stop reason: SDUPTHER

## 2022-11-11 RX ORDER — SIMETHICONE 125 MG
125 TABLET,CHEWABLE ORAL ONCE
Status: COMPLETED | OUTPATIENT
Start: 2022-11-11 | End: 2022-11-11

## 2022-11-11 RX ADMIN — IOHEXOL 100 ML: 350 INJECTION, SOLUTION INTRAVENOUS at 15:41

## 2022-11-11 RX ADMIN — SIMETHICONE 125 MG: 125 TABLET, CHEWABLE ORAL at 16:30

## 2022-11-11 NOTE — ED TRIAGE NOTES
WC  to triage w/ c/o LUQ x 1 day.  Patient had a colonoscopy yesterday.  Denies n/v.  No BM since procedure.  Small amount of flatus today.  Pt appears uncomfortable.

## 2022-11-11 NOTE — ED PROVIDER NOTES
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  Patient reports she underwent a screening colonoscopy by Dr. Luis alva yesterday.  Colonoscopy revealed a small polyp in the ascending colon which was removed, patient reports she was feeling okay following the colonoscopy but then developed acute onset left upper quadrant pain yesterday afternoon.  She reports the pain has persisted and worsened.  She reports she feels like it is severe gas but has passed flatus without much relief.  Patient denies any associated fevers or chills.  She talked to Dr. Castano who checked an x-ray, x-ray revealed considerable gaseous distention of the splenic flexure per patient and she was tender to the emergency department for further evaluation with CT.      REVIEW OF SYSTEMS  ROS    See HPI for further details. All other systems are negative.     PAST MEDICAL HISTORY   has a past medical history of Anesthesia, Arthritis, and High cholesterol.    SOCIAL HISTORY  Social History     Tobacco Use    Smoking status: Never    Smokeless tobacco: Never   Vaping Use    Vaping Use: Never used   Substance and Sexual Activity    Alcohol use: Yes     Comment: Occasional    Drug use: No    Sexual activity: Yes     Comment: Radiation Oncologist       SURGICAL HISTORY   has a past surgical history that includes primary c section (1998); cholecystectomy (1999); blepharoplasty (2008); and bone biopsy (Left, 2/12/2016).    CURRENT MEDICATIONS  Home Medications       Reviewed by Jacinto Dailey R.N. (Registered Nurse) on 11/11/22 at 1203  Med List Status: Not Addressed     Medication Last Dose Status   Calcium-Magnesium-Vitamin D (CALCIUM MAGNESIUM PO)  Active   LORazepam (ATIVAN) 1 MG Tab 11/6/2022 Active                    ALLERGIES  Allergies   Allergen Reactions    Penicillins Hives       PHYSICAL EXAM  Vitals:     11/11/22 1156   BP: (!) 158/79   Pulse: 76   Resp: 18   Temp: 36.8 °C (98.2 °F)   SpO2: 98%       Physical Exam  Constitutional:       Appearance: She is well-developed.   HENT:      Head: Normocephalic and atraumatic.   Eyes:      Conjunctiva/sclera: Conjunctivae normal.   Cardiovascular:      Rate and Rhythm: Normal rate and regular rhythm.   Pulmonary:      Effort: Pulmonary effort is normal.      Breath sounds: Normal breath sounds.   Abdominal:      General: Bowel sounds are normal. There is no distension.      Palpations: Abdomen is soft.      Tenderness: There is abdominal tenderness. There is no rebound.      Comments: LUQ TTP   Musculoskeletal:      Cervical back: Normal range of motion and neck supple.   Skin:     General: Skin is warm and dry.      Findings: No rash.   Neurological:      Mental Status: She is alert and oriented to person, place, and time.   Psychiatric:         Behavior: Behavior normal.         DIAGNOSTIC STUDIES / PROCEDURES      LABS  Results for orders placed or performed during the hospital encounter of 11/11/22   CBC WITH DIFFERENTIAL   Result Value Ref Range    WBC 5.3 4.8 - 10.8 K/uL    RBC 4.20 4.20 - 5.40 M/uL    Hemoglobin 13.0 12.0 - 16.0 g/dL    Hematocrit 39.5 37.0 - 47.0 %    MCV 94.0 81.4 - 97.8 fL    MCH 31.0 27.0 - 33.0 pg    MCHC 32.9 (L) 33.6 - 35.0 g/dL    RDW 45.4 35.9 - 50.0 fL    Platelet Count 254 164 - 446 K/uL    MPV 10.3 9.0 - 12.9 fL    Neutrophils-Polys 61.70 44.00 - 72.00 %    Lymphocytes 30.10 22.00 - 41.00 %    Monocytes 6.80 0.00 - 13.40 %    Eosinophils 0.40 0.00 - 6.90 %    Basophils 0.60 0.00 - 1.80 %    Immature Granulocytes 0.40 0.00 - 0.90 %    Nucleated RBC 0.00 /100 WBC    Neutrophils (Absolute) 3.27 2.00 - 7.15 K/uL    Lymphs (Absolute) 1.59 1.00 - 4.80 K/uL    Monos (Absolute) 0.36 0.00 - 0.85 K/uL    Eos (Absolute) 0.02 0.00 - 0.51 K/uL    Baso (Absolute) 0.03 0.00 - 0.12 K/uL    Immature Granulocytes (abs) 0.02 0.00 - 0.11 K/uL    NRBC  (Absolute) 0.00 K/uL   CMP   Result Value Ref Range    Sodium 142 135 - 145 mmol/L    Potassium 4.0 3.6 - 5.5 mmol/L    Chloride 108 96 - 112 mmol/L    Co2 25 20 - 33 mmol/L    Anion Gap 9.0 7.0 - 16.0    Glucose 87 65 - 99 mg/dL    Bun 10 8 - 22 mg/dL    Creatinine 0.71 0.50 - 1.40 mg/dL    Calcium 8.9 8.5 - 10.5 mg/dL    AST(SGOT) 16 12 - 45 U/L    ALT(SGPT) 16 2 - 50 U/L    Alkaline Phosphatase 58 30 - 99 U/L    Total Bilirubin 0.5 0.1 - 1.5 mg/dL    Albumin 4.1 3.2 - 4.9 g/dL    Total Protein 6.6 6.0 - 8.2 g/dL    Globulin 2.5 1.9 - 3.5 g/dL    A-G Ratio 1.6 g/dL   LACTIC ACID   Result Value Ref Range    Lactic Acid 1.1 0.5 - 2.0 mmol/L   ESTIMATED GFR   Result Value Ref Range    GFR (CKD-EPI) 97 >60 mL/min/1.73 m 2         RADIOLOGY  CT-ABDOMEN-PELVIS WITH   Final Result      1.  There is no evidence of bowel obstruction or acute inflammation.   2.  There is air in the splenic flexure and descending colon and stool in the right colon but there is no colonic dilatation.   3.  No extraluminal air or free intraperitoneal air.   4.  No abnormal vascular enhancement.   5.  Gallbladder is surgically absent with probable physiologic dilatation of the intrahepatic and extra hepatic biliary tree. No obstructive mass or stone.   6.  Normal appendix.   7.  Incidental benign hepatic hemangiomas.              COURSE & MEDICAL DECISION MAKING  Pertinent Labs & Imaging studies reviewed. (See chart for details)    Very well-appearing patient here with some mild left upper quadrant tenderness.  Certainly splenic laceration is possible or splenic contusion, a rupture of her bowel is also possible that is simply not identified on x-ray, versus simple severe gaseous distention of her splenic flexure.  Will check CT to evaluate for the above differential.  Patient's basic labs are very reassuring.  CT reveals mild gaseous distention of the colon at the splenic flexure; I confirmed this with radiology, I called them, and they  confirmed there is no extraluminal air.  Patient is otherwise very well-appearing.  She reports her pain has resolved.  Patient will be discharged home in good condition.  Simethicone has been given.      The patient will return for worsening symptoms and is stable at the time of discharge. The patient verbalizes understanding and will comply.    FINAL IMPRESSION    1. Gaseous abdominal distention            Electronically signed by: Gus Dhillon M.D., 11/11/2022 12:26 PM

## 2022-11-12 NOTE — DISCHARGE INSTRUCTIONS
Your CT was reassurin.  There is no evidence of bowel obstruction or acute inflammation.   2.  There is air in the splenic flexure and descending colon and stool in the right colon but there is no colonic dilatation.   3.  No extraluminal air or free intraperitoneal air.   4.  No abnormal vascular enhancement.   5.  Gallbladder is surgically absent with probable physiologic dilatation of the intrahepatic and extra hepatic biliary tree. No obstructive mass or stone.   6.  Normal appendix.   7.  Incidental benign hepatic hemangiomas.

## 2022-12-20 ENCOUNTER — PHARMACY VISIT (OUTPATIENT)
Dept: PHARMACY | Facility: MEDICAL CENTER | Age: 57
End: 2022-12-20
Payer: COMMERCIAL

## 2022-12-20 PROCEDURE — RXMED WILLOW AMBULATORY MEDICATION CHARGE: Performed by: INTERNAL MEDICINE

## 2023-01-31 ENCOUNTER — HOSPITAL ENCOUNTER (OUTPATIENT)
Dept: RADIOLOGY | Facility: MEDICAL CENTER | Age: 58
End: 2023-01-31
Attending: FAMILY MEDICINE
Payer: COMMERCIAL

## 2023-01-31 DIAGNOSIS — Z12.31 ENCOUNTER FOR MAMMOGRAM TO ESTABLISH BASELINE MAMMOGRAM: ICD-10-CM

## 2023-01-31 PROCEDURE — 77063 BREAST TOMOSYNTHESIS BI: CPT

## 2023-02-16 ENCOUNTER — TELEPHONE (OUTPATIENT)
Dept: MEDICAL GROUP | Facility: MEDICAL CENTER | Age: 58
End: 2023-02-16
Payer: COMMERCIAL

## 2023-02-16 DIAGNOSIS — S19.9XXD INJURY OF NECK, SUBSEQUENT ENCOUNTER: ICD-10-CM

## 2023-02-21 ENCOUNTER — APPOINTMENT (RX ONLY)
Dept: URBAN - METROPOLITAN AREA CLINIC 6 | Facility: CLINIC | Age: 58
Setting detail: DERMATOLOGY
End: 2023-02-21

## 2023-02-21 DIAGNOSIS — L81.4 OTHER MELANIN HYPERPIGMENTATION: ICD-10-CM

## 2023-02-21 DIAGNOSIS — Z71.89 OTHER SPECIFIED COUNSELING: ICD-10-CM

## 2023-02-21 DIAGNOSIS — D18.0 HEMANGIOMA: ICD-10-CM

## 2023-02-21 DIAGNOSIS — L82.1 OTHER SEBORRHEIC KERATOSIS: ICD-10-CM

## 2023-02-21 DIAGNOSIS — L82.0 INFLAMED SEBORRHEIC KERATOSIS: ICD-10-CM

## 2023-02-21 DIAGNOSIS — L57.0 ACTINIC KERATOSIS: ICD-10-CM

## 2023-02-21 DIAGNOSIS — Z87.2 PERSONAL HISTORY OF DISEASES OF THE SKIN AND SUBCUTANEOUS TISSUE: ICD-10-CM

## 2023-02-21 DIAGNOSIS — Z85.828 PERSONAL HISTORY OF OTHER MALIGNANT NEOPLASM OF SKIN: ICD-10-CM

## 2023-02-21 DIAGNOSIS — D22 MELANOCYTIC NEVI: ICD-10-CM

## 2023-02-21 PROBLEM — D18.01 HEMANGIOMA OF SKIN AND SUBCUTANEOUS TISSUE: Status: ACTIVE | Noted: 2023-02-21

## 2023-02-21 PROBLEM — D22.5 MELANOCYTIC NEVI OF TRUNK: Status: ACTIVE | Noted: 2023-02-21

## 2023-02-21 PROBLEM — D48.5 NEOPLASM OF UNCERTAIN BEHAVIOR OF SKIN: Status: ACTIVE | Noted: 2023-02-21

## 2023-02-21 PROCEDURE — ? BIOPSY BY SHAVE METHOD

## 2023-02-21 PROCEDURE — 17003 DESTRUCT PREMALG LES 2-14: CPT | Mod: 59

## 2023-02-21 PROCEDURE — 11102 TANGNTL BX SKIN SINGLE LES: CPT | Mod: 59

## 2023-02-21 PROCEDURE — ? SUNSCREEN RECOMMENDATIONS

## 2023-02-21 PROCEDURE — ? LIQUID NITROGEN

## 2023-02-21 PROCEDURE — 99213 OFFICE O/P EST LOW 20 MIN: CPT | Mod: 25

## 2023-02-21 PROCEDURE — ? COUNSELING

## 2023-02-21 PROCEDURE — ? SUNSCREEN TREATMENT REGIMEN

## 2023-02-21 PROCEDURE — 11103 TANGNTL BX SKIN EA SEP/ADDL: CPT | Mod: 59

## 2023-02-21 PROCEDURE — 17000 DESTRUCT PREMALG LESION: CPT | Mod: 59

## 2023-02-21 PROCEDURE — 17110 DESTRUCTION B9 LES UP TO 14: CPT

## 2023-02-21 ASSESSMENT — LOCATION ZONE DERM
LOCATION ZONE: TRUNK
LOCATION ZONE: EYELID
LOCATION ZONE: LIP
LOCATION ZONE: LEG
LOCATION ZONE: FACE
LOCATION ZONE: FEET
LOCATION ZONE: ARM
LOCATION ZONE: HAND

## 2023-02-21 ASSESSMENT — LOCATION SIMPLE DESCRIPTION DERM
LOCATION SIMPLE: LEFT UPPER BACK
LOCATION SIMPLE: LEFT LIP
LOCATION SIMPLE: LEFT HAND
LOCATION SIMPLE: LEFT EYEBROW
LOCATION SIMPLE: RIGHT HAND
LOCATION SIMPLE: CHEST
LOCATION SIMPLE: RIGHT UPPER ARM
LOCATION SIMPLE: RIGHT SHOULDER
LOCATION SIMPLE: LEFT TEMPLE
LOCATION SIMPLE: LEFT FOREARM
LOCATION SIMPLE: UPPER BACK
LOCATION SIMPLE: LEFT PRETIBIAL REGION
LOCATION SIMPLE: LEFT FOOT
LOCATION SIMPLE: RIGHT EYELID
LOCATION SIMPLE: ABDOMEN

## 2023-02-21 ASSESSMENT — LOCATION DETAILED DESCRIPTION DERM
LOCATION DETAILED: RIGHT LATERAL PROXIMAL UPPER ARM
LOCATION DETAILED: MIDDLE STERNUM
LOCATION DETAILED: LEFT LOWER CUTANEOUS LIP
LOCATION DETAILED: SUPERIOR THORACIC SPINE
LOCATION DETAILED: RIGHT POSTERIOR SHOULDER
LOCATION DETAILED: LEFT RADIAL DORSAL HAND
LOCATION DETAILED: RIGHT LATERAL SUPERIOR CHEST
LOCATION DETAILED: LEFT UPPER CUTANEOUS LIP
LOCATION DETAILED: RIGHT RADIAL DORSAL HAND
LOCATION DETAILED: LEFT LATERAL EYEBROW
LOCATION DETAILED: RIGHT LATERAL CANTHUS
LOCATION DETAILED: EPIGASTRIC SKIN
LOCATION DETAILED: LEFT MID TEMPLE
LOCATION DETAILED: SUBXIPHOID
LOCATION DETAILED: LEFT PROXIMAL DORSAL FOREARM
LOCATION DETAILED: LEFT PROXIMAL PRETIBIAL REGION
LOCATION DETAILED: LEFT SUPERIOR UPPER BACK
LOCATION DETAILED: LEFT LATERAL SUPERIOR CHEST
LOCATION DETAILED: LEFT SUPERIOR MEDIAL UPPER BACK
LOCATION DETAILED: LEFT LATERAL DORSAL FOOT

## 2023-02-21 ASSESSMENT — PAIN INTENSITY VAS: HOW INTENSE IS YOUR PAIN 0 BEING NO PAIN, 10 BEING THE MOST SEVERE PAIN POSSIBLE?: NO PAIN

## 2023-02-21 NOTE — PROCEDURE: LIQUID NITROGEN
Detail Level: Detailed
Show Spray Paint Technique Variable?: Yes
Duration Of Freeze Thaw-Cycle (Seconds): 10-15
Post-Care Instructions: I reviewed with the patient in detail post-care instructions. Patient is to wear sunprotection, and avoid picking at any of the treated lesions. Pt may apply Vaseline to crusted or scabbing areas.
Spray Paint Text: The liquid nitrogen was applied to the skin utilizing a spray paint frosting technique.
Add 52 Modifier (Optional): no
Medical Necessity Information: It is in your best interest to select a reason for this procedure from the list below. All of these items fulfill various CMS LCD requirements except the new and changing color options.
Consent: The patient's consent was obtained including but not limited to risks of crusting, scabbing, blistering, scarring, darker or lighter pigmentary change, recurrence, incomplete removal and infection.
Number Of Freeze-Thaw Cycles: 3 freeze-thaw cycles
Medical Necessity Clause: This procedure was medically necessary because the lesions that were treated were:
Duration Of Freeze Thaw-Cycle (Seconds): 10
Number Of Freeze-Thaw Cycles: 2 freeze-thaw cycles

## 2023-02-23 ENCOUNTER — APPOINTMENT (OUTPATIENT)
Dept: RADIOLOGY | Facility: MEDICAL CENTER | Age: 58
End: 2023-02-23
Attending: FAMILY MEDICINE
Payer: COMMERCIAL

## 2023-02-23 ENCOUNTER — OFFICE VISIT (OUTPATIENT)
Dept: PHYSICAL MEDICINE AND REHAB | Facility: MEDICAL CENTER | Age: 58
End: 2023-02-23
Payer: COMMERCIAL

## 2023-02-23 VITALS
WEIGHT: 125 LBS | BODY MASS INDEX: 20.83 KG/M2 | HEIGHT: 65 IN | OXYGEN SATURATION: 96 % | DIASTOLIC BLOOD PRESSURE: 60 MMHG | SYSTOLIC BLOOD PRESSURE: 108 MMHG | HEART RATE: 75 BPM | TEMPERATURE: 97.3 F

## 2023-02-23 DIAGNOSIS — G89.29 CHRONIC NECK PAIN: ICD-10-CM

## 2023-02-23 DIAGNOSIS — R51.9 CHRONIC NONINTRACTABLE HEADACHE, UNSPECIFIED HEADACHE TYPE: ICD-10-CM

## 2023-02-23 DIAGNOSIS — M62.838 MUSCLE SPASM: ICD-10-CM

## 2023-02-23 DIAGNOSIS — S19.9XXD INJURY OF NECK, SUBSEQUENT ENCOUNTER: ICD-10-CM

## 2023-02-23 DIAGNOSIS — M48.02 CERVICAL SPINAL STENOSIS: ICD-10-CM

## 2023-02-23 DIAGNOSIS — G89.29 CHRONIC NONINTRACTABLE HEADACHE, UNSPECIFIED HEADACHE TYPE: ICD-10-CM

## 2023-02-23 DIAGNOSIS — M47.812 CERVICAL SPONDYLOSIS: ICD-10-CM

## 2023-02-23 DIAGNOSIS — M54.2 CHRONIC NECK PAIN: ICD-10-CM

## 2023-02-23 DIAGNOSIS — M50.30 DDD (DEGENERATIVE DISC DISEASE), CERVICAL: ICD-10-CM

## 2023-02-23 PROCEDURE — 72040 X-RAY EXAM NECK SPINE 2-3 VW: CPT

## 2023-02-23 PROCEDURE — 99204 OFFICE O/P NEW MOD 45 MIN: CPT | Performed by: PHYSICAL MEDICINE & REHABILITATION

## 2023-02-23 RX ORDER — TIZANIDINE 4 MG/1
4 TABLET ORAL NIGHTLY PRN
Qty: 30 TABLET | Refills: 2 | Status: SHIPPED | OUTPATIENT
Start: 2023-02-23 | End: 2023-04-24

## 2023-02-23 RX ORDER — MELOXICAM 15 MG/1
15 TABLET ORAL
Qty: 60 TABLET | Refills: 1 | Status: SHIPPED | OUTPATIENT
Start: 2023-02-23

## 2023-02-23 RX ORDER — GABAPENTIN 100 MG/1
100-300 CAPSULE ORAL NIGHTLY PRN
Qty: 90 CAPSULE | Refills: 3 | Status: SHIPPED | OUTPATIENT
Start: 2023-02-23

## 2023-02-23 ASSESSMENT — PAIN SCALES - GENERAL: PAINLEVEL: 2=MINIMAL-SLIGHT

## 2023-02-23 ASSESSMENT — FIBROSIS 4 INDEX: FIB4 SCORE: 0.94

## 2023-02-23 ASSESSMENT — PATIENT HEALTH QUESTIONNAIRE - PHQ9: CLINICAL INTERPRETATION OF PHQ2 SCORE: 0

## 2023-02-23 NOTE — PROGRESS NOTES
New patient note    Interventional spine and Pain  Physiatry (physical medicine and  Rehabilitation)     Date of service: See epic    Chief complaint:   Chief Complaint   Patient presents with    Establish Care     Neck Pain, Door fell on head         Referring provider:     HISTORY    HPI: Christa Juarez MD 58 y.o.  who presents today with Diagnoses of Chronic neck pain, Cervical spinal stenosis, DDD (degenerative disc disease), cervical, Cervical spondylosis, Muscle spasm, and Chronic nonintractable headache, unspecified headache type were pertinent to this visit.    HPI    The patient works as a radiation oncologist.  She has had chronic neck pain for many years.  This has been a constant pain which happens daily however there has been intermittent flares in the past.  Associated with spasms.  Pain is bilateral in the cervical spine throughout the cervical spine.  Also with associated trapezius pain and periscapular pain.    She has worked with physical therapy in the past.  She has home exercise program.  She has worked with an osteopathic doctor who did manipulations which were helpful in the past.  She works with a [employer].    Medications tried in the past include muscle relaxers with Flexeril which were not effective.  She has tried a combination of opioid and benzodiazepine which were effective.  She is also tried NSAIDs.  Topical diclofenac gel.           Medical records review:  I reviewed the note from the referring provider Allegra Luong M.D. including the note dated 8/4/2022..           ROS:   Red Flags ROS:   Fever, Chills, Sweats: Denies  Involuntary Weight Loss: Denies  Bladder Incontinence: Denies  Bowel Incontinence: denies  Saddle Anesthesia: Denies    All other systems reviewed and negative.       PMHx:   Past Medical History:   Diagnosis Date    Anesthesia     Nausea in recovery    Arthritis     High cholesterol          Current Outpatient Medications on File Prior to Visit    Medication Sig Dispense Refill    COVID-19mRNA Bival Vacc Pfizer (PFIZER COVID-19 BIVALENT) 30 MCG/0.3ML Suspension injection Inject  into the shoulder, thigh, or buttocks. 0.3 mL 0    LORazepam (ATIVAN) 1 MG Tab Take 0.5 Tablets by mouth every four hours as needed for Anxiety. For neck spasms      Calcium-Magnesium-Vitamin D (CALCIUM MAGNESIUM PO) Take  by mouth every day.       No current facility-administered medications on file prior to visit.        PSHx:   Past Surgical History:   Procedure Laterality Date    BONE BIOPSY Left 2016    Procedure: WRIST BIOPSY WITH POSSIBLE BONE GRAFT ALLO ;  Surgeon: Benjamin Menezes M.D.;  Location: SURGERY [facility];  Service:     BLEPHAROPLASTY      CHOLECYSTECTOMY      PRIMARY C SECTION  1998           Family history   Family History   Problem Relation Age of Onset    Cancer Other     Cancer Paternal Aunt     Arthritis Mother     Hyperlipidemia Mother     Arthritis Brother     Cancer Brother         Prostate         Medications: reviewed on epic.   Outpatient Medications Marked as Taking for the 23 encounter (Office Visit) with Akira Briscoe M.D.   Medication Sig Dispense Refill    tizanidine (ZANAFLEX) 4 MG Tab Take 1 Tablet by mouth at bedtime as needed (muscle spasms). 30 Tablet 2    gabapentin (NEURONTIN) 100 MG Cap Take 1-3 Capsules by mouth at bedtime as needed (pain). 90 Capsule 3    meloxicam (MOBIC) 15 MG tablet Take 1 Tablet by mouth 1 time a day as needed (pain). Do not take other NSAIDs. No refills. 60 Tablet 1    COVID-19mRNA Bival Vacc Pfizer (PFIZER COVID-19 BIVALENT) 30 MCG/0.3ML Suspension injection Inject  into the shoulder, thigh, or buttocks. 0.3 mL 0    LORazepam (ATIVAN) 1 MG Tab Take 0.5 Tablets by mouth every four hours as needed for Anxiety. For neck spasms      Calcium-Magnesium-Vitamin D (CALCIUM MAGNESIUM PO) Take  by mouth every day.          Allergies:   Allergies   Allergen Reactions    Penicillins Hives  "      Social Hx:   Social History     Socioeconomic History    Marital status:      Spouse name: Not on file    Number of children: Not on file    Years of education: Not on file    Highest education level: Professional school degree (e.g., MD, DDS, DVM, AMISHA)   Occupational History    Not on file   Tobacco Use    Smoking status: Never    Smokeless tobacco: Never   Vaping Use    Vaping Use: Never used   Substance and Sexual Activity    Alcohol use: Yes     Comment: Occasional    Drug use: No    Sexual activity: Yes     Comment: Radiation Oncologist   Other Topics Concern    Not on file   Social History Narrative    Not on file     Social Determinants of Health     Financial Resource Strain: Not on file   Food Insecurity: Not on file   Transportation Needs: Not on file   Physical Activity: Not on file   Stress: Not on file   Social Connections: Not on file   Intimate Partner Violence: Not on file   Housing Stability: Not on file         EXAMINATION     Physical Exam:   Vitals: /60 (BP Location: Right arm, Patient Position: Sitting, BP Cuff Size: Adult)   Pulse 75   Temp 36.3 °C (97.3 °F) (Temporal)   Ht 1.651 m (5' 5\")   Wt 56.7 kg (125 lb)   SpO2 96%     Constitutional:   Body Habitus: Body mass index is 20.8 kg/m².  Cooperation: Fully cooperates with exam  Appearance: Well-groomed, well-nourished, not disheveled     Eyes: No scleral icterus to suggest severe liver disease, no proptosis to suggest severe hyperthyroid    ENT -no obvious auditory deficits, no obvious tongue lesions, tongue midline, no facial droop     Skin -no rashes or lesions noted     Respiratory-  breathing comfortable on room air, no audible wheezing    Cardiovascular- capillary refills less than 2 seconds.     Psychiatric- alert and oriented ×3. Normal affect.     Gait - normal gait, no use of ambulatory device, nonantalgic. .     Musculoskeletal and Neuro -       Cervical spine   Inspection: No deformities of the skin over the " cervical spine. No rashes or lesions.    decreased  active range of motion in all directions, with  pain      Spurling’s sign: negative bilaterally    No signs of muscular atrophy in bilateral upper extremities     No tenderness to palpation of the cervical spine        Key points for the international standards for neurological classification of spinal cord injury (ISNCSCI) to light touch.     Dermatome R L   C4 2 2   C5 2 2   C6 2 2   C7 2 2   C8 2 2   T1 2 2   T2 2 2                                     Motor Exam Upper Extremities   ? Myotome R L   Shoulder flexion C5 5 5   Elbow flexion C5 5 5   Wrist extension C6 5 5   Elbow extension C7 5 5   Finger flexion C8 5 5   Finger abduction T1 5 5     Reflexes  ?  R L   Biceps  2+ 2+   Brachioradialis  2+ 2+     Gallagher’s sign negative bilaterally                MEDICAL DECISION MAKING    Medical records review: see under HPI section.     DATA    Labs:   Lab Results   Component Value Date/Time    SODIUM 142 11/11/2022 01:21 PM    POTASSIUM 4.0 11/11/2022 01:21 PM    CHLORIDE 108 11/11/2022 01:21 PM    CO2 25 11/11/2022 01:21 PM    ANION 9.0 11/11/2022 01:21 PM    GLUCOSE 87 11/11/2022 01:21 PM    BUN 10 11/11/2022 01:21 PM    CREATININE 0.71 11/11/2022 01:21 PM    CALCIUM 8.9 11/11/2022 01:21 PM    ASTSGOT 16 11/11/2022 01:21 PM    ALTSGPT 16 11/11/2022 01:21 PM    TBILIRUBIN 0.5 11/11/2022 01:21 PM    ALBUMIN 4.1 11/11/2022 01:21 PM    TOTPROTEIN 6.6 11/11/2022 01:21 PM    GLOBULIN 2.5 11/11/2022 01:21 PM    AGRATIO 1.6 11/11/2022 01:21 PM   ]    No results found for: PROTHROMBTM, INR     Lab Results   Component Value Date/Time    WBC 5.3 11/11/2022 01:21 PM    RBC 4.20 11/11/2022 01:21 PM    HEMOGLOBIN 13.0 11/11/2022 01:21 PM    HEMATOCRIT 39.5 11/11/2022 01:21 PM    MCV 94.0 11/11/2022 01:21 PM    MCH 31.0 11/11/2022 01:21 PM    MCHC 32.9 (L) 11/11/2022 01:21 PM    MPV 10.3 11/11/2022 01:21 PM    NEUTSPOLYS 61.70 11/11/2022 01:21 PM    LYMPHOCYTES 30.10 11/11/2022  01:21 PM    MONOCYTES 6.80 11/11/2022 01:21 PM    EOSINOPHILS 0.40 11/11/2022 01:21 PM    BASOPHILS 0.60 11/11/2022 01:21 PM        No results found for: HBA1C     Imaging:   I personally reviewed following images, these are my reads  MRI cervical spine 12/21/2020  There is mild central canal stenosis at C5-6 however the CSF flow on both the anterior and the posterior aspect of the cord are minimal and with this type of narrowing this could easily end up being moderate or severe central canal stenosis if this progresses.  At C5-6 on the right there is severe neuroforaminal stenosis.  On the left at C5-6 there is moderate foraminal stenosis.  Degenerative disc disease is worst at C5-6.  There is facet arthropathy at multiple levels in the cervical spine.        IMAGING radiology reads. I reviewed the following radiology reads                  Results for orders placed during the hospital encounter of 12/21/20    MR-CERVICAL SPINE-W/O    Impression  1.  Mild spinal canal narrowing at C5-C6 secondary to broad central disc protrusion and endplate spurring    2.  Foraminal stenoses at C4-5, C5-6, and C6-7      Results for orders placed during the hospital encounter of 12/20/19    MR-LUMBAR SPINE-W/O    Impression  1.  Mild degenerative changes of the lumbar spine  2.  No areas of high-grade central canal or neural foraminal narrowing  3.  Mild LEFT neural foraminal narrowing at L4-L5  4.  Segment 1 hepatic lesion, incompletely assessed but likely a cyst or hemangioma        Results for orders placed during the hospital encounter of 12/20/19    MR-LUMBAR SPINE-W/O    Impression  1.  Mild degenerative changes of the lumbar spine  2.  No areas of high-grade central canal or neural foraminal narrowing  3.  Mild LEFT neural foraminal narrowing at L4-L5  4.  Segment 1 hepatic lesion, incompletely assessed but likely a cyst or hemangioma                                                 Results for orders placed in visit on  07/25/22    DX-FOOT-COMPLETE 3+ RIGHT                               Results for orders placed in visit on 11/19/21    DX-WRIST-COMPLETE 3+ LEFT         Diagnosis   Visit Diagnoses     ICD-10-CM   1. Chronic neck pain  M54.2    G89.29   2. Cervical spinal stenosis  M48.02   3. DDD (degenerative disc disease), cervical  M50.30   4. Cervical spondylosis  M47.812   5. Muscle spasm  M62.838   6. Chronic nonintractable headache, unspecified headache type  R51.9    G89.29           ASSESSMENT AND PLAN:  Christa Juarez MD 58 y.o. female      Christa was seen today for establish care.    Diagnoses and all orders for this visit:    Chronic neck pain  -     tizanidine (ZANAFLEX) 4 MG Tab; Take 1 Tablet by mouth at bedtime as needed (muscle spasms).  -     gabapentin (NEURONTIN) 100 MG Cap; Take 1-3 Capsules by mouth at bedtime as needed (pain).  -     Referral to Physical Therapy  -     Referral for Acupuncture  -     MR-CERVICAL SPINE-W/O; Future  -     Referral to Chiropractic  -     meloxicam (MOBIC) 15 MG tablet; Take 1 Tablet by mouth 1 time a day as needed (pain). Do not take other NSAIDs. No refills.    Cervical spinal stenosis  -     tizanidine (ZANAFLEX) 4 MG Tab; Take 1 Tablet by mouth at bedtime as needed (muscle spasms).  -     gabapentin (NEURONTIN) 100 MG Cap; Take 1-3 Capsules by mouth at bedtime as needed (pain).  -     Referral to Physical Therapy  -     Referral for Acupuncture  -     MR-CERVICAL SPINE-W/O; Future  -     Referral to Chiropractic  -     meloxicam (MOBIC) 15 MG tablet; Take 1 Tablet by mouth 1 time a day as needed (pain). Do not take other NSAIDs. No refills.    DDD (degenerative disc disease), cervical  -     tizanidine (ZANAFLEX) 4 MG Tab; Take 1 Tablet by mouth at bedtime as needed (muscle spasms).  -     gabapentin (NEURONTIN) 100 MG Cap; Take 1-3 Capsules by mouth at bedtime as needed (pain).  -     Referral to Physical Therapy  -     Referral for Acupuncture  -     MR-CERVICAL  SPINE-W/O; Future  -     Referral to Chiropractic  -     meloxicam (MOBIC) 15 MG tablet; Take 1 Tablet by mouth 1 time a day as needed (pain). Do not take other NSAIDs. No refills.    Cervical spondylosis  -     tizanidine (ZANAFLEX) 4 MG Tab; Take 1 Tablet by mouth at bedtime as needed (muscle spasms).  -     gabapentin (NEURONTIN) 100 MG Cap; Take 1-3 Capsules by mouth at bedtime as needed (pain).  -     Referral to Physical Therapy  -     Referral for Acupuncture  -     MR-CERVICAL SPINE-W/O; Future  -     Referral to Chiropractic  -     meloxicam (MOBIC) 15 MG tablet; Take 1 Tablet by mouth 1 time a day as needed (pain). Do not take other NSAIDs. No refills.    Muscle spasm  -     tizanidine (ZANAFLEX) 4 MG Tab; Take 1 Tablet by mouth at bedtime as needed (muscle spasms).  -     gabapentin (NEURONTIN) 100 MG Cap; Take 1-3 Capsules by mouth at bedtime as needed (pain).  -     Referral to Physical Therapy  -     Referral for Acupuncture  -     MR-CERVICAL SPINE-W/O; Future  -     Referral to Chiropractic  -     meloxicam (MOBIC) 15 MG tablet; Take 1 Tablet by mouth 1 time a day as needed (pain). Do not take other NSAIDs. No refills.    Chronic nonintractable headache, unspecified headache type  -     Referral to Physical Therapy  -     Referral for Acupuncture  -     Referral to Neurology  -     Referral to Chiropractic  -     meloxicam (MOBIC) 15 MG tablet; Take 1 Tablet by mouth 1 time a day as needed (pain). Do not take other NSAIDs. No refills.      The patient is neurologically intact.  On reviewing the previous imaging while this technically was mild central canal stenosis this was in a way where minimal progression could result in severe spinal stenosis.  She has had worsening pain.  We discussed new imaging and I ordered that as above as well as the above consultations.    Physical therapy: I ordered physical therapy to focus on strengthening and stretching.     home exercise program: We discussed optimal  ergonomics, chin tucks, improving workstation.    Diagnostic workup: As above    Medications:   As above     Interventional program: I would consider the patient for an epidural steroid injection versus medial branch blocks depending on the results of the above     Referrals:   As above        Follow-up: After the above diagnostic studies           Please note that this dictation was created using voice recognition software. I have made every reasonable attempt to correct obvious errors but there may be errors of grammar and content that I may have overlooked prior to finalization of this note.      Akira Briscoe MD  Physical Medicine and Rehabilitation  Interventional Spine and Sports Physiatry  [facility]          Allegra Luong M.D.

## 2023-02-24 ENCOUNTER — APPOINTMENT (OUTPATIENT)
Dept: RADIOLOGY | Facility: MEDICAL CENTER | Age: 58
End: 2023-02-24
Attending: PHYSICAL MEDICINE & REHABILITATION
Payer: COMMERCIAL

## 2023-02-24 DIAGNOSIS — G89.29 CHRONIC NECK PAIN: ICD-10-CM

## 2023-02-24 DIAGNOSIS — M48.02 CERVICAL SPINAL STENOSIS: ICD-10-CM

## 2023-02-24 DIAGNOSIS — M50.30 DDD (DEGENERATIVE DISC DISEASE), CERVICAL: ICD-10-CM

## 2023-02-24 DIAGNOSIS — M62.838 MUSCLE SPASM: ICD-10-CM

## 2023-02-24 DIAGNOSIS — M54.2 CHRONIC NECK PAIN: ICD-10-CM

## 2023-02-24 DIAGNOSIS — M47.812 CERVICAL SPONDYLOSIS: ICD-10-CM

## 2023-02-24 PROCEDURE — 72141 MRI NECK SPINE W/O DYE: CPT

## 2023-02-28 DIAGNOSIS — S22.020A CLOSED WEDGE COMPRESSION FRACTURE OF T2 VERTEBRA, INITIAL ENCOUNTER (HCC): ICD-10-CM

## 2023-02-28 DIAGNOSIS — M54.2 CHRONIC NECK PAIN: ICD-10-CM

## 2023-02-28 DIAGNOSIS — G89.29 CHRONIC NECK PAIN: ICD-10-CM

## 2023-02-28 NOTE — Clinical Note
HIGH-PRIORITY  Jeff Hutchins. The patient physician.  There was an incidental finding of a subacute T2 compression fracture.  I am out on FMLA.  I sent a referral to you.  Can you expedite the patient's visit with your clinic.  Thank you.  Akira

## 2023-03-01 NOTE — PROGRESS NOTES
I reviewed the MRI cervical spine.  There is a subacute fracture at the T2 vertebral body at the superior portion.  This is new since the previous study.  I placed an urgent referral to spine surgery for consultation.  No chiropractic care for now.  Hold off on physical therapy and conservative treatments.    I will be out on FMLA starting tomorrow.    I sent a high-priority secure epic message to the patient's PCP with the above information.    I sent a high-priority secure epic message to Dr. Galvez asking if he can expedite the patient's care.    Akira Briscoe MD  Interventional Spine and Pain  Physical Medicine and Rehabilitation  [facility]   2/28/2023 9:53 PM

## 2023-03-07 ENCOUNTER — PATIENT MESSAGE (OUTPATIENT)
Dept: PHYSICAL MEDICINE AND REHAB | Facility: MEDICAL CENTER | Age: 58
End: 2023-03-07
Payer: COMMERCIAL

## 2023-03-07 DIAGNOSIS — M50.30 DDD (DEGENERATIVE DISC DISEASE), CERVICAL: ICD-10-CM

## 2023-03-07 DIAGNOSIS — S22.020A CLOSED WEDGE COMPRESSION FRACTURE OF T2 VERTEBRA, INITIAL ENCOUNTER (HCC): ICD-10-CM

## 2023-03-07 DIAGNOSIS — M81.0 OSTEOPOROSIS, UNSPECIFIED OSTEOPOROSIS TYPE, UNSPECIFIED PATHOLOGICAL FRACTURE PRESENCE: ICD-10-CM

## 2023-03-07 DIAGNOSIS — M47.812 CERVICAL SPONDYLOSIS: ICD-10-CM

## 2023-03-09 NOTE — PROGRESS NOTES
I called the patient and discussed the case.    She reports that she reviewed the images with neuroradiology.  She also reviewed the case with her brother who is an orthopedic surgeon as well as with spine surgery with Dr. Galvez.  I advised the patient to continue restrictions from spine surgery including no skiing.  No chiropractic care.    I placed an order for a DEXA as well as a referral to endocrinology        Follow-up with PCP after DEXA and endocrinology referral.

## 2023-03-16 ENCOUNTER — HOSPITAL ENCOUNTER (OUTPATIENT)
Dept: RADIOLOGY | Facility: MEDICAL CENTER | Age: 58
End: 2023-03-16
Attending: PHYSICAL MEDICINE & REHABILITATION
Payer: COMMERCIAL

## 2023-03-16 ENCOUNTER — PATIENT MESSAGE (OUTPATIENT)
Dept: PHYSICAL MEDICINE AND REHAB | Facility: MEDICAL CENTER | Age: 58
End: 2023-03-16

## 2023-03-16 DIAGNOSIS — M50.30 DDD (DEGENERATIVE DISC DISEASE), CERVICAL: ICD-10-CM

## 2023-03-16 DIAGNOSIS — M81.0 OSTEOPOROSIS, UNSPECIFIED OSTEOPOROSIS TYPE, UNSPECIFIED PATHOLOGICAL FRACTURE PRESENCE: ICD-10-CM

## 2023-03-16 DIAGNOSIS — M47.812 CERVICAL SPONDYLOSIS: ICD-10-CM

## 2023-03-16 DIAGNOSIS — S22.020A CLOSED WEDGE COMPRESSION FRACTURE OF T2 VERTEBRA, INITIAL ENCOUNTER (HCC): ICD-10-CM

## 2023-03-16 PROCEDURE — 77080 DXA BONE DENSITY AXIAL: CPT

## 2023-04-06 ENCOUNTER — PHYSICAL THERAPY (OUTPATIENT)
Dept: PHYSICAL THERAPY | Facility: MEDICAL CENTER | Age: 58
End: 2023-04-06
Attending: PHYSICAL MEDICINE & REHABILITATION
Payer: COMMERCIAL

## 2023-04-06 DIAGNOSIS — R51.9 CHRONIC NONINTRACTABLE HEADACHE, UNSPECIFIED HEADACHE TYPE: ICD-10-CM

## 2023-04-06 DIAGNOSIS — G89.29 CHRONIC NECK PAIN: ICD-10-CM

## 2023-04-06 DIAGNOSIS — M47.812 CERVICAL SPONDYLOSIS: ICD-10-CM

## 2023-04-06 DIAGNOSIS — M48.02 CERVICAL SPINAL STENOSIS: ICD-10-CM

## 2023-04-06 DIAGNOSIS — G89.29 CHRONIC NONINTRACTABLE HEADACHE, UNSPECIFIED HEADACHE TYPE: ICD-10-CM

## 2023-04-06 DIAGNOSIS — M54.2 CHRONIC NECK PAIN: ICD-10-CM

## 2023-04-06 DIAGNOSIS — M62.838 MUSCLE SPASM: ICD-10-CM

## 2023-04-06 DIAGNOSIS — M50.30 DDD (DEGENERATIVE DISC DISEASE), CERVICAL: ICD-10-CM

## 2023-04-06 PROCEDURE — 97012 MECHANICAL TRACTION THERAPY: CPT

## 2023-04-06 PROCEDURE — 97162 PT EVAL MOD COMPLEX 30 MIN: CPT

## 2023-04-06 ASSESSMENT — ENCOUNTER SYMPTOMS
QUALITY: DULL ACHE
PAIN SCALE AT HIGHEST: 10
ALLEVIATING FACTORS: ACTIVITY
PAIN SCALE: 3
QUALITY: SHARP
QUALITY: STABBING
QUALITY: ACHING
MIGRAINE HEADACHES: 1
PAIN SCALE AT LOWEST: 0
PAIN TIMING: EVERY MORNING
QUALITY: KNIFE-LIKE

## 2023-04-06 NOTE — OP THERAPY EVALUATION
"  Outpatient Physical Therapy  INITIAL EVALUATION    [facility] Outpatient Physical Therapy  [address]  [place] [address]  Phone:  [phone]  Fax:  [phone]    Date of Evaluation: 04/06/2023    Patient: Christa Juraez MD  YOB: 1965  MRN: [id number]     Referring Provider: Akira Briscoe M.D.  [address]  [address]  [place] [address]   Referring Diagnosis Chronic neck pain [M54.2, G89.29];Cervical spinal stenosis [M48.02];DDD (degenerative disc disease), cervical [M50.30];Cervical spondylosis [M47.812];Muscle spasm [M62.838];Chronic nonintractable headache, unspecified headache type [R51.9, G89.29]     Time Calculation  Start time: 0735  Stop time: 0830 Time Calculation (min): 55 minutes         Chief Complaint: Neck Problem    Visit Diagnoses     ICD-10-CM   1. Chronic neck pain  M54.2    G89.29   2. Cervical spinal stenosis  M48.02   3. DDD (degenerative disc disease), cervical  M50.30   4. Cervical spondylosis  M47.812   5. Muscle spasm  M62.838   6. Chronic nonintractable headache, unspecified headache type  R51.9    G89.29       Date of onset of impairment: 4/6/2003    Subjective:   History of Present Illness:     Date of onset:  4/6/2003    Mechanism of injury:  Per Dr. Briscoe on 2/23/23:  \"HPI: Christa Juarez MD 58 y.o.  who presents today with Diagnoses of Chronic neck pain, Cervical spinal stenosis, DDD (degenerative disc disease), cervical, Cervical spondylosis, Muscle spasm, and Chronic nonintractable headache, unspecified headache type were pertinent to this visit.     HPI     The patient works as a radiation oncologist.  She has had chronic neck pain for many years.  This has been a constant pain which happens daily however there has been intermittent flares in the past.  Associated with spasms.  Pain is bilateral in the cervical spine throughout the cervical spine.  Also with associated trapezius pain and " "periscapular pain.     She has worked with physical therapy in the past.  She has home exercise program.  She has worked with an osteopathic doctor who did manipulations which were helpful in the past.  She works with a [employer].     Medications tried in the past include muscle relaxers with Flexeril which were not effective.  She has tried a combination of opioid and benzodiazepine which were effective.  She is also tried NSAIDs.  Topical diclofenac gel.\"    Pt reports that she has had a 20 year history of neck pain, pt has been taking ativan. Pt reports in January she was walking through a gate at a warehouse and was struck by the drop gate. Pt reports travel will often flare her symptoms significantly. Pt denies her neck pain is related to her migraines.              Headaches:  migraine headaches  Pain:     Current pain rating:  3    At best pain ratin    At worst pain rating:  10    Location:  Lower to mid cervical spine    Quality:  Aching, sharp, stabbing, knife-like and dull ache    Pain timing:  Every morning    Relieving factors:  Activity    Pain Comments::  Painting and arms up makes it worse, walking dog will make worse.  Treatments:     Previous treatment:  Chiropractic, acupuncture, massage and medication    Current treatment:  Heat    Treatment Comments:  Uses moist heat almost every single day.   Patient Goals:     Patient goals for therapy:  Decreased pain, increased motion and increased strength    Other patient goals:  Return to painting, return to pickleball, return to walking dogs comfortably.    Past Medical History:   Diagnosis Date    Anesthesia     Nausea in recovery    Arthritis     High cholesterol      Past Surgical History:   Procedure Laterality Date    BONE BIOPSY Left 2016    Procedure: WRIST BIOPSY WITH POSSIBLE BONE GRAFT ALLO ;  Surgeon: Benjamin Menezes M.D.;  Location: SURGERY [facility];  Service:     BLEPHAROPLASTY      CHOLECYSTECTOMY      PRIMARY C " SECTION  1998         Social History     Tobacco Use    Smoking status: Never    Smokeless tobacco: Never   Substance Use Topics    Alcohol use: Yes     Comment: Occasional     Family and Occupational History     Socioeconomic History    Marital status:      Spouse name: Not on file    Number of children: Not on file    Years of education: Not on file    Highest education level: Professional school degree (e.g., MD, DDS, DVM, AMISHA)   Occupational History    Not on file       Objective     Shoulder Screen    Shoulder active range of motion within functional limits.  Shoulder strength within functional limits.    Neurological Testing     Myotome testing   Cervical (left)   All left cervical myotomes within normal limits    Cervical (right)   All right cervical myotomes within normal limits    Dermatome testing   Cervical (left)   All left cervical dermatomes intact    Cervical (right)   All right cervical dermatomes intact    Active Range of Motion     Cervical Spine   Flexion: decreased  Extension: decreased  Retraction: within functional limits  Left lateral flexion: decreased  Right lateral flexion: decreased    Additional Active Range of Motion Details  R rot: 56  L Rot: 54    Significant pain with sidebending B/L    Alicia Cervical Test     Sitting repeated motions:   Retraction with extension in sitting     Symptoms during testing: produces    Symptoms after testing: no worse  Repeat retraction with extension in sitting     Symptoms during testing: decreases    Symptoms after testing: better    Mechanical response: no effect  Lateral flexion left in sitting     Symptoms during testing: produces    Symptoms after testing: no worse  Repeat lateral flexion left in sitting     Symptoms during testing: decreases    Symptoms after testing: better    Mechanical response: no effect  Repeat lateral flexion right in sitting     Symptoms during testing: decreases    Symptoms after testing: better     Mechanical response: no effect  Flexion in sitting     Symptoms during testing: produces    Symptoms after testing: no worse  Repeat flexion in sitting     Symptoms during testing: decreases    Symptoms after testing: better    Mechanical response: no effect      Therapeutic Exercises (CPT 60888):     1. Cervical flexion, 2x8, Within painfree limits    2. Cervical extension, 2x8, within painfree limits    3. Cervical sidebending, 2x8, within painfree limits      Therapeutic Exercise Summary: HEP:   Cervical flexion 2x8 3/day  Cervical ext 2x8 3/day  Cervical Sb2x8 3/day    Therapeutic Treatments and Modalities:     1. Mechanical Traction (CPT 82382), Cervical spine, 12/5#  10 mins    2. Therapeutic Activities (CPT 90647), Pt education (see below)    Therapeutic Treatment and Modalities Summary: Pt was educated regarding introductory pain neuroscience education concepts, expectations for rehab and exercise program, purpose of exercise program.   Time-based treatments/modalities:    Physical Therapy Timed Treatment Charges  Therapeutic activity minutes (CPT 03656): 10 minutes  Therapeutic exercise minutes (CPT 45178): 10 minutes      Assessment, Response and Plan:   Impairments: activity intolerance, hypersensitivity and limited mobility    Assessment details:  Pt is a 59 y/o F presenting to PT with signs and symptoms consistent with chronic cervical pain exacerbated by recent injury from a drop gate. Pt has deficits of reduced cervical rom, long standing pain, activity intolerance for painting, reading, and recreation. Given pt's age, overall health, current condition and adherence to PT recommendations, anticipated duration of episode is 10-12 weeks.     Goals:   Short Term Goals:   1. Pt will be able to read for 20 minutes pain free or near pain free (2/10 or lower on numeric pain rating scale)   2. Pt will be able to actively rotate cervical spine B/L to 60 degrees pain free or near pain free (2/10 or lower on  numeric pain rating scale)   Short term goal time span:  2-4 weeks      Long Term Goals:    1. Pt will be able to return to all recreational activities pain free or near pain free (2/10 or lower on numeric pain rating scale)  2. Pt will achieve arom of cervical spine within normal limits pain free or near pain free (2/10 or lower on numeric pain rating scale)    Long term goal time span:  1-2 months    Plan:   Therapy options:  Physical therapy treatment to continue  Planned therapy interventions:  Manual Therapy (CPT 00651), Mechanical Traction (CPT 45590), Neuromuscular Re-education (CPT 52267), Therapeutic Exercise (CPT 74682) and Therapeutic Activities (CPT 96002)  Frequency:  1x week  Duration in weeks:  12  Duration in visits:  12  Discussed with:  Patient    Functional Assessment Used  Neck Disability Total: 12     Referring provider co-signature:  I have reviewed this plan of care and my co-signature certifies the need for services.    Certification Period: 04/06/2023 to  06/29/23    Physician Signature: ________________________________ Date: ______________

## 2023-04-07 ENCOUNTER — APPOINTMENT (OUTPATIENT)
Dept: PHYSICAL THERAPY | Facility: MEDICAL CENTER | Age: 58
End: 2023-04-07
Attending: PHYSICAL MEDICINE & REHABILITATION
Payer: COMMERCIAL

## 2023-04-13 ENCOUNTER — APPOINTMENT (OUTPATIENT)
Dept: PHYSICAL THERAPY | Facility: MEDICAL CENTER | Age: 58
End: 2023-04-13
Attending: PHYSICAL MEDICINE & REHABILITATION
Payer: COMMERCIAL

## 2023-04-14 ENCOUNTER — PHYSICAL THERAPY (OUTPATIENT)
Dept: PHYSICAL THERAPY | Facility: MEDICAL CENTER | Age: 58
End: 2023-04-14
Attending: PHYSICAL MEDICINE & REHABILITATION
Payer: COMMERCIAL

## 2023-04-14 DIAGNOSIS — G89.29 CHRONIC NECK PAIN: ICD-10-CM

## 2023-04-14 DIAGNOSIS — M54.2 CHRONIC NECK PAIN: ICD-10-CM

## 2023-04-14 PROCEDURE — 97110 THERAPEUTIC EXERCISES: CPT

## 2023-04-14 PROCEDURE — 97140 MANUAL THERAPY 1/> REGIONS: CPT

## 2023-04-14 NOTE — OP THERAPY DAILY TREATMENT
Outpatient Physical Therapy  DAILY TREATMENT     [facility] Outpatient Physical Therapy  [address]  [place] [address]  Phone:  [phone]  Fax:  [phone]    Date: 04/14/2023    Patient: Christa Juarez MD  YOB: 1965  MRN: [id number]     Time Calculation    Start time: 0903  Stop time: 0946 Time Calculation (min): 43 minutes         Chief Complaint: Neck Pain    Visit #: 2    SUBJECTIVE:  Pt reports signifcant improvement with pain following her initial evaluation. She reports exercises are going well and feels the traction and manual techniques have benefited her especially.          Therapeutic Exercises (CPT 83666):     1. Cervical flexion, 2x8, Within painfree limits HEP    2. Cervical extension, 2x8, within painfree limits HEP    3. Cervical sidebending, 2x8, Reviewed - HEP    4. Chin tucks, Reviewed    5. Rows, 3x15 orange TB, HEP    6. UBE    7. SNAGS, 2x8 (5 second holds) B/L, HEP      Therapeutic Exercise Summary: HEP:   Cervical flexion 2x8 3/day  Cervical ext 2x8 3/day  Cervical Sb2x8 3/day  Rows (3x12-15)  SNAGS 2x8 (5 second holds)    Therapeutic Treatments and Modalities:     1. Mechanical Traction (CPT 74387), Cervical spine, 12/5#  10 mins    2. Therapeutic Activities (CPT 78778), Pt education (see below)    Therapeutic Treatment and Modalities Summary: Pt was educated regarding stretching activity, continued arom within tolerable limits as well as updated exercise program and self management techniques.  Time-based treatments/modalities:    Physical Therapy Timed Treatment Charges  Manual therapy minutes (CPT 46352): 15 minutes  Therapeutic exercise minutes (CPT 01596): 25 minutes      Pain rating (1-10) before treatment:  1  Pain rating (1-10) after treatment:  1    ASSESSMENT:   Response to treatment: Pt had excellent tolerance for today's PT session, she continues to make appropriate progress. Next session will be a progress  note to address lateral epicondalgia symptoms in conjunction with cervical pain.     PLAN/RECOMMENDATIONS:   Plan for treatment: therapy treatment to continue next visit.  Planned interventions for next visit: continue with current treatment, manual therapy (CPT 16621), mechanical traction (CPT 84610), therapeutic activities (CPT 22681), and therapeutic exercise (CPT 59726).          Alert and oriented to person, place and time

## 2023-04-24 ENCOUNTER — OFFICE VISIT (OUTPATIENT)
Dept: PHYSICAL MEDICINE AND REHAB | Facility: MEDICAL CENTER | Age: 58
End: 2023-04-24
Payer: COMMERCIAL

## 2023-04-24 VITALS
HEART RATE: 64 BPM | SYSTOLIC BLOOD PRESSURE: 130 MMHG | TEMPERATURE: 97.4 F | OXYGEN SATURATION: 99 % | BODY MASS INDEX: 21.12 KG/M2 | DIASTOLIC BLOOD PRESSURE: 70 MMHG | WEIGHT: 126.76 LBS | HEIGHT: 65 IN

## 2023-04-24 DIAGNOSIS — M79.10 MYALGIA: ICD-10-CM

## 2023-04-24 DIAGNOSIS — M50.30 DDD (DEGENERATIVE DISC DISEASE), CERVICAL: ICD-10-CM

## 2023-04-24 DIAGNOSIS — E55.9 VITAMIN D DEFICIENCY: ICD-10-CM

## 2023-04-24 DIAGNOSIS — S22.020D CLOSED WEDGE COMPRESSION FRACTURE OF T2 VERTEBRA WITH ROUTINE HEALING, SUBSEQUENT ENCOUNTER: ICD-10-CM

## 2023-04-24 DIAGNOSIS — M47.812 CERVICAL SPONDYLOSIS: ICD-10-CM

## 2023-04-24 DIAGNOSIS — M48.02 CERVICAL SPINAL STENOSIS: ICD-10-CM

## 2023-04-24 PROCEDURE — 99214 OFFICE O/P EST MOD 30 MIN: CPT | Performed by: PHYSICAL MEDICINE & REHABILITATION

## 2023-04-24 ASSESSMENT — FIBROSIS 4 INDEX: FIB4 SCORE: 0.94

## 2023-04-24 ASSESSMENT — PATIENT HEALTH QUESTIONNAIRE - PHQ9: CLINICAL INTERPRETATION OF PHQ2 SCORE: 0

## 2023-04-24 ASSESSMENT — PAIN SCALES - GENERAL: PAINLEVEL: 3=SLIGHT PAIN

## 2023-04-25 NOTE — PROGRESS NOTES
Follow up patient note  Interventional spine and Pain  Physiatry (physical medicine and  Rehabilitation)       Chief complaint:   Chief Complaint   Patient presents with    Follow-Up     2m fv/ review image          HISTORY    Please see new patient note by Dr Briscoe,  for more details.     HPI  Patient identification: Christa Agustín Juarez MD ,  1965,   With Diagnoses of Closed wedge compression fracture of T2 vertebra, initial encounter ([facility]), DDD (degenerative disc disease), cervical, Cervical spondylosis, Cervical spinal stenosis, and Vitamin D deficiency were pertinent to this visit.       Fracture pain is improving significantly.  Pain in the neck is 3 out of 10 in intensity with associated trapezius and periscapular pain.  Denies radiating pain down the arms.  Denies numbness tingling or weakness.    She used Zanaflex 3 times which was effective however she had worsening of headaches and felt fatigued the day after taking it.    Meloxicam is helpful and she uses intermittently as needed.     I reviewed notes from Dr. Leonor MOORE Red Flags :   Fever, Chills, Sweats: Denies  Involuntary Weight Loss: Denies  Bowel/Bladder Incontinence: Denies  Saddle Anesthesia: Denies        PMHx:   Past Medical History:   Diagnosis Date    Anesthesia     Nausea in recovery    Arthritis     High cholesterol        PSHx:   Past Surgical History:   Procedure Laterality Date    BONE BIOPSY Left 2016    Procedure: WRIST BIOPSY WITH POSSIBLE BONE GRAFT ALLO ;  Surgeon: Benjamin Menezes M.D.;  Location: SURGERY [facility];  Service:     BLEPHAROPLASTY      CHOLECYSTECTOMY      PRIMARY C SECTION  1998           Family history   Family History   Problem Relation Age of Onset    Cancer Other     Cancer Paternal Aunt     Arthritis Mother     Hyperlipidemia Mother     Arthritis Brother     Cancer Brother         Prostate         Medications:   Outpatient Medications Marked as Taking for the 23  encounter (Office Visit) with Akira Briscoe M.D.   Medication Sig Dispense Refill    sumatriptan (IMITREX) 100 MG tablet TAKE 1 TABLET BY MOUTH EVERY DAY AS NEEDED FOR HEAD ACHE      ondansetron (ZOFRAN) 4 MG Tab tablet TAKE 1 TABLET BY MOUTH TWICE A DAY AS NEEDED FOR NAUSEA AND VOMITING      meloxicam (MOBIC) 15 MG tablet Take 1 Tablet by mouth 1 time a day as needed (pain). Do not take other NSAIDs. No refills. 60 Tablet 1    LORazepam (ATIVAN) 1 MG Tab Take 0.5 Tablets by mouth every four hours as needed for Anxiety. For neck spasms      Calcium-Magnesium-Vitamin D (CALCIUM MAGNESIUM PO) Take  by mouth every day.          Current Outpatient Medications on File Prior to Visit   Medication Sig Dispense Refill    sumatriptan (IMITREX) 100 MG tablet TAKE 1 TABLET BY MOUTH EVERY DAY AS NEEDED FOR HEAD ACHE      ondansetron (ZOFRAN) 4 MG Tab tablet TAKE 1 TABLET BY MOUTH TWICE A DAY AS NEEDED FOR NAUSEA AND VOMITING      meloxicam (MOBIC) 15 MG tablet Take 1 Tablet by mouth 1 time a day as needed (pain). Do not take other NSAIDs. No refills. 60 Tablet 1    LORazepam (ATIVAN) 1 MG Tab Take 0.5 Tablets by mouth every four hours as needed for Anxiety. For neck spasms      Calcium-Magnesium-Vitamin D (CALCIUM MAGNESIUM PO) Take  by mouth every day.      gabapentin (NEURONTIN) 100 MG Cap Take 1-3 Capsules by mouth at bedtime as needed (pain). 90 Capsule 3    COVID-19mRNA Bival Vacc Pfizer (PFIZER COVID-19 BIVALENT) 30 MCG/0.3ML Suspension injection Inject  into the shoulder, thigh, or buttocks. (Patient not taking: Reported on 4/24/2023) 0.3 mL 0     No current facility-administered medications on file prior to visit.         Allergies:   Allergies   Allergen Reactions    Penicillins Hives    Diagnostic X-Ray Materials Hives, Itching and Rash    Zanaflex [Tizanidine]      headache       Social Hx:   Social History     Socioeconomic History    Marital status:      Spouse name: Not on file    Number of children: Not  "on file    Years of education: Not on file    Highest education level: Professional school degree (e.g., MD, DDS, DVM, AMISHA)   Occupational History    Not on file   Tobacco Use    Smoking status: Never    Smokeless tobacco: Never   Vaping Use    Vaping Use: Never used   Substance and Sexual Activity    Alcohol use: Yes     Comment: Occasional    Drug use: No    Sexual activity: Yes     Comment: Radiation Oncologist   Other Topics Concern    Not on file   Social History Narrative    Not on file     Social Determinants of Health     Financial Resource Strain: Not on file   Food Insecurity: Not on file   Transportation Needs: Not on file   Physical Activity: Not on file   Stress: Not on file   Social Connections: Not on file   Intimate Partner Violence: Not on file   Housing Stability: Not on file         EXAMINATION     Physical Exam:   Vitals: /70 (BP Location: Right arm, Patient Position: Sitting, BP Cuff Size: Adult)   Pulse 64   Temp 36.3 °C (97.4 °F) (Temporal)   Ht 1.651 m (5' 5\")   Wt 57.5 kg (126 lb 12.2 oz)   SpO2 99%     Constitutional:   Body Habitus: Body mass index is 21.09 kg/m².  Cooperation: Fully cooperates with exam  Appearance: Well-groomed no disheveled    Respiratory-  breathing comfortable on room air, no audible wheezing  Cardiovascular- capillary refills less than 2 seconds. No lower extremity edema is noted.   Psychiatric- alert and oriented ×3. Normal affect.    MSK and Neuro: -  Strength is 5 out of 5 in the bilateral upper extremities.  Full range of motion cervical spine.  Mild trigger points of the trapezius muscle and rhomboids.    No areas of tenderness palpation throughout the spine including at T2.  No tenderness to percussion throughout the spine including T2.      MEDICAL DECISION MAKING    DATA    Labs:   Lab Results   Component Value Date/Time    SODIUM 142 11/11/2022 01:21 PM    POTASSIUM 4.0 11/11/2022 01:21 PM    CHLORIDE 108 11/11/2022 01:21 PM    CO2 25 11/11/2022 " 01:21 PM    GLUCOSE 87 11/11/2022 01:21 PM    BUN 10 11/11/2022 01:21 PM    CREATININE 0.71 11/11/2022 01:21 PM        No results found for: PROTHROMBTM, INR     Lab Results   Component Value Date/Time    WBC 5.3 11/11/2022 01:21 PM    RBC 4.20 11/11/2022 01:21 PM    HEMOGLOBIN 13.0 11/11/2022 01:21 PM    HEMATOCRIT 39.5 11/11/2022 01:21 PM    MCV 94.0 11/11/2022 01:21 PM    MCH 31.0 11/11/2022 01:21 PM    MCHC 32.9 (L) 11/11/2022 01:21 PM    MPV 10.3 11/11/2022 01:21 PM    NEUTSPOLYS 61.70 11/11/2022 01:21 PM    LYMPHOCYTES 30.10 11/11/2022 01:21 PM    MONOCYTES 6.80 11/11/2022 01:21 PM    EOSINOPHILS 0.40 11/11/2022 01:21 PM    BASOPHILS 0.60 11/11/2022 01:21 PM        No results found for: HBA1C       Imaging:   I personally reviewed following images    MRI cervical spine 2/20/2023  T2 subacute compression fracture.  Multilevel degenerative changes with facet arthropathy and foraminal stenosis.        I reviewed the following radiology reports                     Results for orders placed in visit on 02/24/23    MR-CERVICAL SPINE-W/O    Impression  1.  There is subacute fracture along the anterior superior corner of T2 vertebral body. Bone marrow edema is noted along the superior portion. There is no disruption of the posterior cortex. The anterior and posterior longitudinal ligaments are intact.  There is no epidural hemorrhage. This is new since the previous study. There is no evidence of spinal cord injury.  2.  Multifocal degenerative disease in the cervical spine as described above. There has been no significant interval change.  3.  There is tiny 4 mm sized left thyroid nodule stable since the previous study.      Results for orders placed during the hospital encounter of 12/20/19    MR-LUMBAR SPINE-W/O    Impression  1.  Mild degenerative changes of the lumbar spine  2.  No areas of high-grade central canal or neural foraminal narrowing  3.  Mild LEFT neural foraminal narrowing at L4-L5  4.  Segment 1  hepatic lesion, incompletely assessed but likely a cyst or hemangioma        Results for orders placed during the hospital encounter of 12/20/19    MR-LUMBAR SPINE-W/O    Impression  1.  Mild degenerative changes of the lumbar spine  2.  No areas of high-grade central canal or neural foraminal narrowing  3.  Mild LEFT neural foraminal narrowing at L4-L5  4.  Segment 1 hepatic lesion, incompletely assessed but likely a cyst or hemangioma                                                                       Results for orders placed during the hospital encounter of 11/11/22    CT-ABDOMEN-PELVIS WITH    Impression  1.  There is no evidence of bowel obstruction or acute inflammation.  2.  There is air in the splenic flexure and descending colon and stool in the right colon but there is no colonic dilatation.  3.  No extraluminal air or free intraperitoneal air.  4.  No abnormal vascular enhancement.  5.  Gallbladder is surgically absent with probable physiologic dilatation of the intrahepatic and extra hepatic biliary tree. No obstructive mass or stone.  6.  Normal appendix.  7.  Incidental benign hepatic hemangiomas.                    Results for orders placed in visit on 02/23/23    DX-CERVICAL SPINE-2 OR 3 VIEWS    Impression  1.  Multilevel facet arthropathy and endplate spurring    2.  Minimal posterior subluxations at C4-5 and C5-6        Results for orders placed in visit on 03/30/23    DX-ELBOW-COMPLETE 3+ LEFT      Results for orders placed in visit on 07/25/22    DX-FOOT-COMPLETE 3+ RIGHT                               Results for orders placed in visit on 11/19/21    DX-WRIST-COMPLETE 3+ LEFT         DIAGNOSIS   Visit Diagnoses     ICD-10-CM   1. Closed wedge compression fracture of T2 vertebra, initial encounter ([facility])  S22.020A   2. DDD (degenerative disc disease), cervical  M50.30   3. Cervical spondylosis  M47.812   4. Cervical spinal stenosis  M48.02   5. Vitamin D deficiency  E55.9         ASSESSMENT and  PLAN:     Christa Juarez MD  1965 female      Christa was seen today for follow-up.    Diagnoses and all orders for this visit:    Closed wedge compression fracture of T2 vertebra with routine healing, subsequent encounter  Comments:  Stable, no signs of acute fracture on exam.    DDD (degenerative disc disease), cervical  Comments:  Stable, continue home exercise program    Cervical spondylosis  Comments:  Stable, continue home exercise program.  Consider medial branch blocks if she fails conservative treatments    Cervical spinal stenosis  Comments:  Stable, continue home exercise program    Vitamin D deficiency  Comments:  I advised the patient to follow-up with her PCP for the results of the vitamin D test.  Orders:  -     VITAMIN D,25 HYDROXY (DEFICIENCY)    Myalgia  Comments:  Continue home exercise program and physical therapy.  Consider trigger point injections if she fails conservative treatments.            Follow up: I am referring the patient back to her PCP.  Patient can follow-up with me as needed.    Thank you for allowing me to participate in the care of this patient. If you have any questions please not hesitate to contact me.             Please note that this dictation was created using voice recognition software. I have made every reasonable attempt to correct obvious errors but there may be errors of grammar and content that I may have overlooked prior to finalization of this note.      Akira Briscoe MD  Interventional Spine and Sports Physiatry  Physical Medicine and Rehabilitation  [facility]

## 2023-04-27 ENCOUNTER — APPOINTMENT (OUTPATIENT)
Dept: PHYSICAL THERAPY | Facility: MEDICAL CENTER | Age: 58
End: 2023-04-27
Attending: PHYSICAL MEDICINE & REHABILITATION
Payer: COMMERCIAL

## 2023-04-27 DIAGNOSIS — G89.29 CHRONIC NECK PAIN: ICD-10-CM

## 2023-04-27 DIAGNOSIS — M54.2 CHRONIC NECK PAIN: ICD-10-CM

## 2023-04-27 PROCEDURE — 97530 THERAPEUTIC ACTIVITIES: CPT

## 2023-04-27 PROCEDURE — 97110 THERAPEUTIC EXERCISES: CPT

## 2023-04-27 NOTE — OP THERAPY DAILY TREATMENT
"  Outpatient Physical Therapy  DAILY TREATMENT     [facility] Outpatient Physical Therapy  [address]  [place] [address]  Phone:  [phone]  Fax:  [phone]    Date: 04/27/2023    Patient: Christa Juarez MD  YOB: 1965  MRN: [id number]     Time Calculation    Start time: 1620  Stop time: 1703 Time Calculation (min): 43 minutes         Chief Complaint: No chief complaint on file.    Visit #: 3    SUBJECTIVE:  Pt reports her neck pain has been slightly flared d/t increase in time travelling.           Therapeutic Exercises (CPT 30278):     1. Cervical flexion, 1x8, Within painfree limits HEP    2. Cervical extension, 1x8, within painfree limits HEP    3. Cervical sidebending, 2x8, NT    4. Chin tuck w lift, 2x10    5. Rows, 3x15 orange TB, HEP    6. UBE, 3 forward 3 backward    7. SNAGS, 2x8 (5 second holds) B/L, NT    8. lumbar/thoracic stretch, 1x10 (15\" holds) (physioball), HEP    9. open books, 1x15 B/L    10. Physioball lumbar stretch, 1x15 (10\" holds), HEP      Therapeutic Exercise Summary: HEP:   Cervical flexion 2x8 3/day  Cervical ext 2x8 3/day  Cervical Sb2x8 3/day  Rows (3x12-15)  SNAGS 2x8 (5 second holds)  Physioball rollout (lumbar stretch) 1x15    Therapeutic Treatments and Modalities:     1. Therapeutic Activities (CPT 41753), Home traction unit    Therapeutic Treatment and Modalities Summary: Pt was educated use of home traction unit and set up.  Time-based treatments/modalities:    Physical Therapy Timed Treatment Charges  Therapeutic activity minutes (CPT 39190): 10 minutes  Therapeutic exercise minutes (CPT 28560): 30 minutes      Pain rating (1-10) before treatment:  2    ASSESSMENT:   Response to treatment: Pt had good tolerance for today's PT session. Pt is progressing appropriately. Pt will continue to benefit from skilled PT to address aforementioned deficits.      PLAN/RECOMMENDATIONS:   Plan for treatment: therapy treatment " to continue next visit.  Planned interventions for next visit: continue with current treatment, manual therapy (CPT 05816), mechanical traction (CPT 64657), therapeutic activities (CPT 18990), and therapeutic exercise (CPT 02258).

## 2023-05-04 ENCOUNTER — PHYSICAL THERAPY (OUTPATIENT)
Dept: PHYSICAL THERAPY | Facility: MEDICAL CENTER | Age: 58
End: 2023-05-04
Attending: PHYSICAL MEDICINE & REHABILITATION
Payer: COMMERCIAL

## 2023-05-04 DIAGNOSIS — G89.29 CHRONIC NECK PAIN: ICD-10-CM

## 2023-05-04 DIAGNOSIS — M54.2 CHRONIC NECK PAIN: ICD-10-CM

## 2023-05-04 PROCEDURE — 97140 MANUAL THERAPY 1/> REGIONS: CPT

## 2023-05-04 PROCEDURE — 97110 THERAPEUTIC EXERCISES: CPT

## 2023-05-04 NOTE — OP THERAPY DAILY TREATMENT
"  Outpatient Physical Therapy  DAILY TREATMENT     [facility] Outpatient Physical Therapy  [address]  [place] [address]  Phone:  [phone]  Fax:  [phone]    Date: 05/04/2023    Patient: Christa Juarez MD  YOB: 1965  MRN: [id number]     Time Calculation    Start time: 1632  Stop time: 1715 Time Calculation (min): 43 minutes         Chief Complaint: Neck Pain    Visit #: 4    SUBJECTIVE:  Pt reports good improvement in neck pain since the onset of PT.           Therapeutic Exercises (CPT 50491):     1. Cervical flexion, 1x8, Within painfree limits HEP    2. Cervical extension, 1x8, within painfree limits HEP    3. Cervical sidebending, 2x8, NT    4. Chin tuck w lift, 2x10 10\" holds, HEP    5. Rows, 3x15 Green TB, HEP    6. UBE, 3 forward 3 backward    7. SNAGS, 2x8 (5 second holds) B/L, NT    8. lumbar/thoracic stretch, 1x10 (15\" holds) (physioball), HEP    9. open books, 1x15 B/L, Reviewed    10. Physioball lumbar stretch, 1x15 (10\" holds), NT    11. Levator scap stretch      Therapeutic Exercise Summary: HEP:   Cervical flexion 2x8 3/day  Cervical ext 2x8 3/day  Cervical Sb2x8 3/day  Rows (3x12-15)  SNAGS 2x8 (5 second holds)  Physioball rollout (lumbar stretch) 1x15  Levator scap stretch 4x15\"    [id number]    Therapeutic Treatments and Modalities:     1. Manual Therapy (CPT 59261), Cervical spine prom, stm of paraspinals, resisted shoulder elevation isometrics    Therapeutic Treatment and Modalities Summary: Pt was educated use of home traction unit and set up.  Time-based treatments/modalities:    Physical Therapy Timed Treatment Charges  Manual therapy minutes (CPT 09929): 10 minutes  Therapeutic exercise minutes (CPT 61877): 30 minutes      Pain rating (1-10) before treatment:  2    ASSESSMENT:   Response to treatment: Pt had good tolerance for today's PT session. Pt continues to make appropriate progress with PT. Pt will continue to benefit " from skilled PT to address aforementioned deficits.      PLAN/RECOMMENDATIONS:   Plan for treatment: therapy treatment to continue next visit.  Planned interventions for next visit: continue with current treatment, manual therapy (CPT 03769), therapeutic activities (CPT 71045), and therapeutic exercise (CPT 21573).

## 2023-05-11 ENCOUNTER — PHYSICAL THERAPY (OUTPATIENT)
Dept: PHYSICAL THERAPY | Facility: MEDICAL CENTER | Age: 58
End: 2023-05-11
Attending: PHYSICAL MEDICINE & REHABILITATION
Payer: COMMERCIAL

## 2023-05-11 DIAGNOSIS — M54.2 CHRONIC NECK PAIN: ICD-10-CM

## 2023-05-11 DIAGNOSIS — G89.29 CHRONIC NECK PAIN: ICD-10-CM

## 2023-05-11 PROCEDURE — 97110 THERAPEUTIC EXERCISES: CPT

## 2023-05-11 NOTE — OP THERAPY DAILY TREATMENT
"  Outpatient Physical Therapy  DAILY TREATMENT     [facility] Outpatient Physical Therapy  [address]  [place] [address]  Phone:  [phone]  Fax:  [phone]    Date: 05/11/2023    Patient: Christa Juarez MD  YOB: 1965  MRN: [id number]     Time Calculation    Start time: 0734  Stop time: 0810 Time Calculation (min): 36 minutes         Chief Complaint: Neck Pain    Visit #: 5    SUBJECTIVE:  Pt reports she had a migraine earlier this week, she reports she went to her neurologist (Dr. Holder) and got medication to manage her migraine.           Therapeutic Exercises (CPT 84164):     1. Cervical flexion, 1x8, Within painfree limits HEP    2. Cervical extension, 1x8, within painfree limits HEP    3. Cervical sidebending, 2x8, NT    4. Chin tuck w lift, 2x10 10\" holds, HEP    5. Rows, 3x15 Green TB, HEP    6. UBE, 3 forward 3 backward    7. SNAGS, 2x8 (5 second holds) B/L, NT    8. lumbar/thoracic stretch, 1x10 (15\" holds) (physioball), NT    9. open books, 1x15 B/L, Reviewed    10. Physioball lumbar stretch, 1x15 (10\" holds), NT    11. Levator scap stretch    12. PNF D1, Pink TB 2x15 B/L, HEP    13. PNF D2, Pink TB 2x15      Therapeutic Exercise Summary: HEP:   Cervical flexion 2x8 3/day  Cervical ext 2x8 3/day  Cervical Sb2x8 3/day  Rows (3x12-15)  SNAGS 2x8 (5 second holds)  Physioball rollout (lumbar stretch) 1x15  Levator scap stretch 4x15\"    [id number]    Therapeutic Treatments and Modalities:     1. Manual Therapy (CPT 25375), Cervical spine prom, stm of paraspinals, resisted shoulder elevation isometrics    Therapeutic Treatment and Modalities Summary: Pt was educated use of home traction unit and set up.    Time-based treatments/modalities:    Physical Therapy Timed Treatment Charges  Manual therapy minutes (CPT 16734): 8 minutes  Therapeutic exercise minutes (CPT 16347): 26 minutes      Pain rating (1-10) before treatment:  0    ASSESSMENT: "   Response to treatment: Pt had good tolerance for today's PT session. Pt continues to progress appropriately. Pt will continue to benefit from skilled PT to address aforementioned deficits.      PLAN/RECOMMENDATIONS:   Plan for treatment: therapy treatment to continue next visit.  Planned interventions for next visit: continue with current treatment, neuromuscular re-education (CPT 91628), therapeutic activities (CPT 24122), and therapeutic exercise (CPT 80208).

## 2023-05-11 NOTE — OP THERAPY PROGRESS SUMMARY
Outpatient Physical Therapy  PROGRESS SUMMARY NOTE      [facility] Outpatient Physical Therapy  [address]  [place] [address]  Phone:  [phone]  Fax:  [phone]    Date of Visit: 05/11/2023    Patient: Christa Juarez MD  YOB: 1965  MRN: [id number]     Referring Provider: Akira Briscoe M.D.  [address]  [place] [address]   Referring Diagnosis Headache, unspecified [R51.9];Other chronic pain [G89.29];Other muscle spasm [M62.838];Spondylosis without myelopathy or radiculopathy, cervical region [M47.812];Other cervical disc degeneration, unspecified cervical region [M50.30];Spinal stenosis, cervical region [M48.02];Cervicalgia [M54.2]     Visit Diagnoses     ICD-10-CM   1. Chronic neck pain  M54.2    G89.29       Rehab Potential: excellent    Progress Report Period: 4/6/23-5/11/23    Functional Assessment Used  Neck Disability Total: 9       Objective Findings and Assessment:   Patient progression towards goals: Pt has achieved all short term goals, Pt still has mild increase in pain with all normal activities rating pain 3-4/10.    Objective findings and assessment details: Cervical AROM WNL in all planes    Goals:   Short Term Goals:   1. Pt will be able to perform all regular daily activities and return to all recreational activities pain free or near pain free (2/10 or lower on numeric pain rating scale)    Short term goal time span:  2-4 weeks    Plan:   Planned therapy interventions:  Manual Therapy (CPT 98330), Therapeutic Activities (CPT 29761) and Therapeutic Exercise (CPT 21129)  Frequency:  1x week  Duration in weeks:  4      Referring provider co-signature:  I have reviewed this plan of care and my co-signature certifies the need for services.     Certification Period: 05/11/2023 to 06/08/23    Physician Signature: ________________________________ Date: ______________

## 2023-05-18 ENCOUNTER — APPOINTMENT (OUTPATIENT)
Dept: PHYSICAL THERAPY | Facility: MEDICAL CENTER | Age: 58
End: 2023-05-18
Attending: PHYSICAL MEDICINE & REHABILITATION
Payer: COMMERCIAL

## 2023-05-25 ENCOUNTER — APPOINTMENT (OUTPATIENT)
Dept: PHYSICAL THERAPY | Facility: MEDICAL CENTER | Age: 58
End: 2023-05-25
Attending: PHYSICAL MEDICINE & REHABILITATION
Payer: COMMERCIAL

## 2023-05-25 DIAGNOSIS — G89.29 CHRONIC NECK PAIN: ICD-10-CM

## 2023-05-25 DIAGNOSIS — M54.2 CHRONIC NECK PAIN: ICD-10-CM

## 2023-05-25 PROCEDURE — 97140 MANUAL THERAPY 1/> REGIONS: CPT

## 2023-05-25 PROCEDURE — 97110 THERAPEUTIC EXERCISES: CPT

## 2023-05-25 NOTE — OP THERAPY DAILY TREATMENT
"  Outpatient Physical Therapy  DAILY TREATMENT     [facility] Outpatient Physical Therapy  [address]  [place] [address]  Phone:  [phone]  Fax:  [phone]    Date: 05/25/2023    Patient: Christa Juarez MD  YOB: 1965  MRN: [id number]     Time Calculation                   Chief Complaint: Neck Problem    Visit #: 6    SUBJECTIVE:  Pt reports moderate improvement since their last PT session. Pt reports home exercise program is going well, she has brought in two more head harness units to have assembled for home/travel use.            Therapeutic Exercises (CPT 43332):     1. Cervical flexion, 1x8, Within painfree limits HEP    2. Cervical extension, 1x8, within painfree limits HEP    3. Cervical sidebending, 2x8, NT    4. Chin tuck w lift, 2x10 10\" holds, HEP    5. Rows, 3x15 Green TB, HEP    6. UBE, 3 forward 3 backward    7. SNAGS, 2x8 (5 second holds) B/L, NT    8. lumbar/thoracic stretch, 1x10 (15\" holds) (physioball), NT    9. open books, 1x15 B/L, Reviewed    10. Physioball lumbar stretch, 1x15 (10\" holds), NT    11. Levator scap stretch, 3x10\", HEP    12. PNF D1, Pink TB 2x15 B/L, NT    13. PNF D2, Pink TB 2x15, NT      Therapeutic Exercise Summary: HEP:   Cervical flexion 2x8 3/day  Cervical ext 2x8 3/day  Cervical Sb2x8 3/day  Rows (3x12-15)  SNAGS 2x8 (5 second holds)  Physioball rollout (lumbar stretch) 1x15  Levator scap stretch 4x15\"        Therapeutic Treatments and Modalities:     1. Manual Therapy (CPT 43845), Cervical spine prom, stm of paraspinals, resisted shoulder elevation isometrics    Therapeutic Treatment and Modalities Summary: Pt was educated regarding expectations for potential discharge in next 1-2 visits.    Time-based treatments/modalities:           Pain rating (1-10) before treatment:  2  Pain rating (1-10) after treatment:  1    ASSESSMENT:   Response to treatment: Pt had good tolerance for today's PT session. Pt " continues to progress appropriately, she will benefit from 1-2 more PT appts to ensure safe discharge.      PLAN/RECOMMENDATIONS:   Plan for treatment: therapy treatment to continue next visit.  Planned interventions for next visit: continue with current treatment, manual therapy (CPT 59334), therapeutic activities (CPT 12749), and therapeutic exercise (CPT 69179).

## 2023-06-01 ENCOUNTER — PHYSICAL THERAPY (OUTPATIENT)
Dept: PHYSICAL THERAPY | Facility: MEDICAL CENTER | Age: 58
End: 2023-06-01
Attending: PHYSICAL MEDICINE & REHABILITATION
Payer: COMMERCIAL

## 2023-06-01 DIAGNOSIS — G89.29 CHRONIC NECK PAIN: ICD-10-CM

## 2023-06-01 DIAGNOSIS — M54.2 CHRONIC NECK PAIN: ICD-10-CM

## 2023-06-01 PROCEDURE — 97140 MANUAL THERAPY 1/> REGIONS: CPT

## 2023-06-01 PROCEDURE — 97110 THERAPEUTIC EXERCISES: CPT

## 2023-06-01 NOTE — OP THERAPY DAILY TREATMENT
"  Outpatient Physical Therapy  DAILY TREATMENT     [facility] Outpatient Physical Therapy  [address]  [place] [address]  Phone:  [phone]  Fax:  [phone]    Date: 06/01/2023    Patient: Christa Juarez MD  YOB: 1965  MRN: [id number]     Time Calculation    Start time: 0733  Stop time: 0808 Time Calculation (min): 35 minutes         Chief Complaint: Neck Pain    Visit #: 7    SUBJECTIVE:  Pt reports good improvement since their last PT session. Pt reports home exercise program is going somewhat consistently.       OBJECTIVE:    Neck Disability Total: 8       Therapeutic Exercises (CPT 54301):     1. Cervical flexion, 1x8, Within painfree limits HEP    2. Cervical extension, 1x8, within painfree limits HEP    3. Cervical sidebending, 2x8, NT    4. Chin tuck w lift, 2x10 10\" holds, HEP    5. Rows, 3x15 Green TB, HEP    6. UBE, 3 forward 3 backward    7. SNAGS, 2x8 (5 second holds) B/L, NT    8. lumbar/thoracic stretch, 1x10 (15\" holds) (physioball), NT    9. open books, 1x15 B/L, HEP    10. Physioball lumbar stretch, 1x15 (10\" holds), NT    11. Levator scap stretch, 3x10\", HEP    12. PNF D1, Pink TB 2x15 B/L, HEP    13. PNF D2, Pink TB 2x15, HEP      Therapeutic Exercise Summary: HEP:   Cervical flexion 2x8 3/day  Cervical ext 2x8 3/day  Cervical Sb2x8 3/day  Rows (3x12-15)  SNAGS 2x8 (5 second holds)  Physioball rollout (lumbar stretch) 1x15  Levator scap stretch 4x15\"        Therapeutic Treatments and Modalities:     1. Manual Therapy (CPT 25911), Cervical spine prom, stm of paraspinals, resisted shoulder elevation isometrics    Therapeutic Treatment and Modalities Summary: Pt was educated regarding taking home exercise program independent at home, case will be left open for 30 days to ensure no continual flare ups of current symptoms.     Time-based treatments/modalities:    Physical Therapy Timed Treatment Charges  Manual therapy minutes (CPT " 90671): 10 minutes  Therapeutic exercise minutes (CPT 20957): 20 minutes      Pain rating (1-10) before treatment:  2    ASSESSMENT:   Response to treatment: Pt had good tolerance for today's PT session. Pt is ready to take current HEP completely independent at home and continue with independent self-management.       PLAN/RECOMMENDATIONS:   Plan for treatment: no further treatment needed.  Planned interventions for next visit: continue with current treatment, manual therapy (CPT 68745), therapeutic activities (CPT 18552), and therapeutic exercise (CPT 82925).

## 2023-06-08 ENCOUNTER — APPOINTMENT (OUTPATIENT)
Dept: PHYSICAL THERAPY | Facility: MEDICAL CENTER | Age: 58
End: 2023-06-08
Attending: PHYSICAL MEDICINE & REHABILITATION
Payer: COMMERCIAL

## 2023-06-19 ENCOUNTER — APPOINTMENT (RX ONLY)
Dept: URBAN - METROPOLITAN AREA CLINIC 6 | Facility: CLINIC | Age: 58
Setting detail: DERMATOLOGY
End: 2023-06-19

## 2023-06-19 DIAGNOSIS — L72.0 EPIDERMAL CYST: ICD-10-CM

## 2023-06-19 PROBLEM — D23.39 OTHER BENIGN NEOPLASM OF SKIN OF OTHER PARTS OF FACE: Status: ACTIVE | Noted: 2023-06-19

## 2023-06-19 PROCEDURE — ? BENIGN DESTRUCTION COSMETIC

## 2023-06-19 PROCEDURE — 99212 OFFICE O/P EST SF 10 MIN: CPT

## 2023-06-19 PROCEDURE — ? COUNSELING

## 2023-06-19 PROCEDURE — ? EXTRACTIONS

## 2023-06-19 ASSESSMENT — LOCATION DETAILED DESCRIPTION DERM: LOCATION DETAILED: RIGHT ORAL COMMISSURE

## 2023-06-19 ASSESSMENT — LOCATION SIMPLE DESCRIPTION DERM: LOCATION SIMPLE: RIGHT LIP

## 2023-06-19 ASSESSMENT — LOCATION ZONE DERM: LOCATION ZONE: LIP

## 2023-06-19 NOTE — PROCEDURE: EXTRACTIONS
Render Number Of Lesions Treated: no
Post-Care Instructions: I reviewed with the patient in detail post-care instructions. Patient is to keep the treatment areas dry overnight, and then apply bacitracin twice daily until healed. Patient may apply hydrogen peroxide soaks to remove any crusting.
Extraction Method: 30 gauge needle and comedo extractor
Detail Level: Detailed
Consent was obtained and risks were reviewed including but not limited to scarring, infection, bleeding, scabbing, incomplete removal, and allergy to anesthesia.
Prep Text (Optional): Prior to removal the treatment areas were prepped in the usual fashion.
Acne Type: A milial cyst

## 2023-07-05 ENCOUNTER — TELEPHONE (OUTPATIENT)
Dept: PHYSICAL THERAPY | Facility: MEDICAL CENTER | Age: 58
End: 2023-07-05
Payer: COMMERCIAL

## 2023-07-05 NOTE — OP THERAPY DISCHARGE SUMMARY
Outpatient Physical Therapy  DISCHARGE SUMMARY NOTE      [facility] Outpatient Physical Therapy  [address]  [place] [address]  Phone:  [phone]  Fax:  [phone]    Date of Visit: 07/05/2023    Patient: Christa Juarez MD  YOB: 1965  MRN: [id number]     Referring Provider: No referring provider defined for this encounter.   Referring Diagnosis No admission diagnoses are documented for this encounter.         Functional Assessment Used        Your patient is being discharged from Physical Therapy with the following comments:   Goals met    Comments:       Limitations Remaining:      Recommendations:      Suleiman Fraser, PT    Date: 7/5/2023

## 2023-08-24 ENCOUNTER — APPOINTMENT (RX ONLY)
Dept: URBAN - METROPOLITAN AREA CLINIC 6 | Facility: CLINIC | Age: 58
Setting detail: DERMATOLOGY
End: 2023-08-24

## 2023-08-24 DIAGNOSIS — L21.8 OTHER SEBORRHEIC DERMATITIS: ICD-10-CM

## 2023-08-24 DIAGNOSIS — D18.0 HEMANGIOMA: ICD-10-CM

## 2023-08-24 DIAGNOSIS — L81.4 OTHER MELANIN HYPERPIGMENTATION: ICD-10-CM

## 2023-08-24 DIAGNOSIS — D485 NEOPLASM OF UNCERTAIN BEHAVIOR OF SKIN: ICD-10-CM

## 2023-08-24 DIAGNOSIS — L82.1 OTHER SEBORRHEIC KERATOSIS: ICD-10-CM

## 2023-08-24 DIAGNOSIS — Z71.89 OTHER SPECIFIED COUNSELING: ICD-10-CM

## 2023-08-24 DIAGNOSIS — D22 MELANOCYTIC NEVI: ICD-10-CM

## 2023-08-24 DIAGNOSIS — L259 CONTACT DERMATITIS AND OTHER ECZEMA, UNSPECIFIED CAUSE: ICD-10-CM | Status: STABLE

## 2023-08-24 DIAGNOSIS — Z87.2 PERSONAL HISTORY OF DISEASES OF THE SKIN AND SUBCUTANEOUS TISSUE: ICD-10-CM | Status: STABLE

## 2023-08-24 DIAGNOSIS — Z85.828 PERSONAL HISTORY OF OTHER MALIGNANT NEOPLASM OF SKIN: ICD-10-CM | Status: STABLE

## 2023-08-24 PROBLEM — D18.01 HEMANGIOMA OF SKIN AND SUBCUTANEOUS TISSUE: Status: ACTIVE | Noted: 2023-08-24

## 2023-08-24 PROBLEM — L23.9 ALLERGIC CONTACT DERMATITIS, UNSPECIFIED CAUSE: Status: ACTIVE | Noted: 2023-08-24

## 2023-08-24 PROBLEM — D22.5 MELANOCYTIC NEVI OF TRUNK: Status: ACTIVE | Noted: 2023-08-24

## 2023-08-24 PROBLEM — D48.5 NEOPLASM OF UNCERTAIN BEHAVIOR OF SKIN: Status: ACTIVE | Noted: 2023-08-24

## 2023-08-24 PROCEDURE — 99214 OFFICE O/P EST MOD 30 MIN: CPT | Mod: 25

## 2023-08-24 PROCEDURE — 11102 TANGNTL BX SKIN SINGLE LES: CPT

## 2023-08-24 PROCEDURE — ? PRESCRIPTION MEDICATION MANAGEMENT

## 2023-08-24 PROCEDURE — ? COUNSELING

## 2023-08-24 PROCEDURE — ? SUNSCREEN TREATMENT REGIMEN

## 2023-08-24 PROCEDURE — ? PRESCRIPTION

## 2023-08-24 PROCEDURE — ? BIOPSY BY SHAVE METHOD

## 2023-08-24 RX ORDER — FLUOCINOLONE ACETONIDE 0.11 MG/ML
1 OIL TOPICAL QD
Qty: 118.28 | Refills: 3 | Status: ERX | COMMUNITY
Start: 2023-08-24

## 2023-08-24 RX ADMIN — FLUOCINOLONE ACETONIDE 1: 0.11 OIL TOPICAL at 00:00

## 2023-08-24 ASSESSMENT — LOCATION SIMPLE DESCRIPTION DERM
LOCATION SIMPLE: LEFT RING FINGER
LOCATION SIMPLE: ABDOMEN
LOCATION SIMPLE: RIGHT HAND
LOCATION SIMPLE: LEFT FOREHEAD
LOCATION SIMPLE: RIGHT UPPER ARM
LOCATION SIMPLE: CHEST
LOCATION SIMPLE: LEFT HAND
LOCATION SIMPLE: RIGHT SHOULDER
LOCATION SIMPLE: LEFT FOOT
LOCATION SIMPLE: LEFT UPPER BACK

## 2023-08-24 ASSESSMENT — LOCATION DETAILED DESCRIPTION DERM
LOCATION DETAILED: LEFT LATERAL DORSAL FOOT
LOCATION DETAILED: LEFT PROXIMAL DORSAL RING FINGER
LOCATION DETAILED: PERIUMBILICAL SKIN
LOCATION DETAILED: RIGHT LATERAL SUPERIOR CHEST
LOCATION DETAILED: LEFT INFERIOR FOREHEAD
LOCATION DETAILED: LEFT SUPERIOR MEDIAL FOREHEAD
LOCATION DETAILED: RIGHT ANTERIOR SHOULDER
LOCATION DETAILED: EPIGASTRIC SKIN
LOCATION DETAILED: MIDDLE STERNUM
LOCATION DETAILED: RIGHT RADIAL DORSAL HAND
LOCATION DETAILED: LEFT SUPERIOR MEDIAL UPPER BACK
LOCATION DETAILED: LEFT RADIAL DORSAL HAND
LOCATION DETAILED: RIGHT LATERAL PROXIMAL UPPER ARM

## 2023-08-24 ASSESSMENT — LOCATION ZONE DERM
LOCATION ZONE: HAND
LOCATION ZONE: FEET
LOCATION ZONE: TRUNK
LOCATION ZONE: FACE
LOCATION ZONE: FINGER
LOCATION ZONE: ARM

## 2023-08-24 NOTE — PROCEDURE: PRESCRIPTION MEDICATION MANAGEMENT
Render In Strict Bullet Format?: No
Detail Level: Zone
Initiate Treatment: Derma-Smoothe Scalp Oil
Continue Regimen: clobetasol PRN
Plan: recommend avoiding wearing ring

## 2023-09-24 ENCOUNTER — TELEPHONE (OUTPATIENT)
Dept: MEDICAL GROUP | Facility: MEDICAL CENTER | Age: 58
End: 2023-09-24
Payer: COMMERCIAL

## 2023-09-24 DIAGNOSIS — M54.2 NECK PAIN: ICD-10-CM

## 2023-09-24 RX ORDER — LORAZEPAM 1 MG/1
1 TABLET ORAL EVERY 6 HOURS PRN
Qty: 30 TABLET | Refills: 2 | Status: SHIPPED | OUTPATIENT
Start: 2023-09-24 | End: 2023-10-24

## 2023-09-26 ENCOUNTER — IMMUNIZATION (OUTPATIENT)
Dept: OCCUPATIONAL MEDICINE | Facility: CLINIC | Age: 58
End: 2023-09-26

## 2023-09-26 DIAGNOSIS — Z23 NEED FOR VACCINATION: Primary | ICD-10-CM

## 2023-09-26 PROCEDURE — 90686 IIV4 VACC NO PRSV 0.5 ML IM: CPT | Performed by: PREVENTIVE MEDICINE

## 2023-09-28 NOTE — PROGRESS NOTES
This medical record contains text that has been entered with the assistance of computer voice recognition and dictation software.  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 30 Tablet 2   • Calcium-Magnesium-Vitamin D (CALCIUM MAGNESIUM PO) Take  by mouth every day.       No current facility-administered medications for this visit.     Patient Active Problem List    Diagnosis Date Noted   • Moderate sun exposure 2022   • Pain of foot 2022   • Preventative health care 2022   • Basal cell carcinoma 2020   • Healthcare maintenance 2018   • Seborrheic keratoses 2018   • Neck pain 2018     Past Surgical History:   Procedure Laterality Date   • BONE BIOPSY Left 2016    Procedure: WRIST BIOPSY WITH POSSIBLE BONE GRAFT ALLO ;  Surgeon: Benjamin Menezes M.D.;  Location: SURGERY [facility];  Service:    • BLEPHAROPLASTY     • CHOLECYSTECTOMY     • PRIMARY C SECTION            Social History     Tobacco Use   • Smoking status: Never Smoker   • Smokeless tobacco: Never Used   Vaping Use   • Vaping Use: Never used   Substance Use Topics   • Alcohol use: Yes     Comment: Occasional   • Drug use: No     Family History   Problem Relation Age of Onset   • Cancer Other    • Cancer Paternal Aunt    • Arthritis Mother    • Hyperlipidemia Mother    • Arthritis Brother    • Cancer Brother         Prostate           ROS    all review of system completed and negative except for those listed above     Objective:     /60 (BP Location: Left arm, Patient Position:  "Sitting, BP Cuff Size: Adult)   Pulse 60   Temp 36.8 °C (98.3 °F) (Temporal)   Resp 16   Ht 1.651 m (5' 5\")   Wt 55 kg (121 lb 4.1 oz)   SpO2 97%  Body mass index is 20.18 kg/m².  Physical Exam:    Constitutional: Alert, no distress.  Skin: Warm, dry, good turgor, no rashes in visible areas.  Eye: Equal, round and reactive, conjunctiva clear, lids normal.  ENMT: Lips without lesions, good dentition, oropharynx clear.  Neck: Trachea midline, no masses, no thyromegaly. No cervical or supraclavicular lymphadenopathy.  Respiratory: Unlabored respiratory effort, lungs clear to auscultation, no wheezes, no ronchi.  Cardiovascular: Normal S1, S2, no murmur, no edema.  Abdomen: Soft, non-tender, no masses, no hepatosplenomegaly.  Psych: Alert and oriented x3, normal affect and mood.        Assessment and Plan:   The following treatment plan was discussed        Problem List Items Addressed This Visit     Neck pain     Doing stretches regularly   Takes ativan occasionally   Risk benefit   Consider physiatry consult in the future with x rays prior            Relevant Medications    LORazepam (ATIVAN) 1 MG Tab    Moderate sun exposure     See's derm regularly Dr. Danette Paredes   Referral placed   q6 mo as she has a BCC recently                Relevant Orders    Referral to Dermatology    Pain of foot     Doing PT for metatarsalgia that shes been experiencing lately              Relevant Orders    Referral to Physical Therapy    Preventative health care     Age appropriate prev health care discussed   Cancer screening discussed   Vaccines discussed   Labs offered   Blood pressure at goal     Anticipatory guidance including SPF and other skin protective measures, dental hygiene and care, regular exercise, diet, stress and family planning advice discussed.                 Other Visit Diagnoses     Screening for colon cancer        Relevant Orders    Referral to GI for Colonoscopy    Screening for diabetes mellitus (DM)    "     Relevant Orders    Lipid Profile    Basic Metabolic Panel    Screening for ischemic heart disease        Relevant Orders    Lipid Profile    Basic Metabolic Panel                Instructed to follow up if symptoms worsen or fail to improve, ER/UC precautions discussed as well    Allegra Luong MD  [facility], [address]   [place] [address]  Phone: [phone]                yes

## 2023-12-12 ENCOUNTER — TELEMEDICINE (OUTPATIENT)
Dept: MEDICAL GROUP | Facility: MEDICAL CENTER | Age: 58
End: 2023-12-12
Payer: COMMERCIAL

## 2023-12-12 DIAGNOSIS — E78.00 ELEVATED LDL CHOLESTEROL LEVEL: ICD-10-CM

## 2023-12-12 DIAGNOSIS — M54.2 NECK PAIN: ICD-10-CM

## 2023-12-12 DIAGNOSIS — M77.12 EPICONDYLITIS, LATERAL, LEFT: ICD-10-CM

## 2023-12-12 DIAGNOSIS — F41.8 SITUATIONAL ANXIETY: ICD-10-CM

## 2023-12-12 DIAGNOSIS — Z63.9 FAMILY PROBLEMS: ICD-10-CM

## 2023-12-12 DIAGNOSIS — F41.8 ANTICIPATORY ANXIETY: ICD-10-CM

## 2023-12-12 DIAGNOSIS — Z00.00 PREVENTATIVE HEALTH CARE: ICD-10-CM

## 2023-12-12 PROCEDURE — 99214 OFFICE O/P EST MOD 30 MIN: CPT | Mod: 95 | Performed by: PHYSICIAN ASSISTANT

## 2023-12-12 RX ORDER — METHOCARBAMOL 500 MG/1
500 TABLET, FILM COATED ORAL 3 TIMES DAILY PRN
Qty: 30 TABLET | Refills: 1 | Status: SHIPPED | OUTPATIENT
Start: 2023-12-12

## 2023-12-12 RX ORDER — PROPRANOLOL HYDROCHLORIDE 20 MG/1
TABLET ORAL
Qty: 30 TABLET | Refills: 2 | Status: SHIPPED | OUTPATIENT
Start: 2023-12-12 | End: 2024-01-09

## 2023-12-12 RX ORDER — LORAZEPAM 1 MG/1
1 TABLET ORAL
Qty: 30 TABLET | Refills: 0 | Status: SHIPPED | OUTPATIENT
Start: 2023-12-12 | End: 2024-03-26

## 2023-12-12 SDOH — SOCIAL STABILITY - SOCIAL INSECURITY: PROBLEM RELATED TO PRIMARY SUPPORT GROUP, UNSPECIFIED: Z63.9

## 2023-12-12 NOTE — PROGRESS NOTES
This visit was conducted via Zoom using secure and encrypted videoconferencing technology.   The patient was in a private location outside of their home in the state of [place].    The patient's identity was confirmed and verbal consent was obtained for this virtual visit.     Patient's identity was verified.          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 Patient is presenting to discuss:    Patient has chronic neck pain and left epicondylitis, has seen Dr. Briscoe for it before.  Gets occasional neck pain and spasm, has tried tizanidine which gave her headaches and therefore she has not been taking it anymore.     Patient has a slightly elevated LDL cholesterol, no significant family history of CVD, currently not taking any statins.  No SOB or dyspnea on exertion, no CP, non-smoker nondiabetic    Patient has some family problems, her nephew has mental health problems, he is coming to visit him for holidays and it really affects their family dynamic.  She can get very anxious over this family gatherings  Patient has tried lorazepam as needed for situational anxiety,  Will like to try therapist        Past medical, surgical, family, and social history is reviewed in Epic chart by me today.   Medications and allergies reviewed and updated in Epic chart by me today.          Objective:     There were no vitals taken for this visit. There is no height or weight on file to calculate BMI.   Physical Exam:  Pain: sounds comfortable   Constitutional: Alert, no distress.  Eye: pupils Equal, conjunctiva clear,   Respiratory: Unlabored respiratory effort, speaking in full sentences, no audible wheezes.           Assessment and Plan:   The following treatment plan was discussed      1. Preventative health care    - Comp Metabolic Panel; Future  - CBC WITH DIFFERENTIAL; Future  - Lipid Profile; Future  - TSH WITH REFLEX TO FT4;  Future  - VITAMIN D,25 HYDROXY (DEFICIENCY); Future    2. Anticipatory anxiety    - propranolol (INDERAL) 20 MG Tab; Take 1 Tablet by mouth 30 min before the event for anticipatory anxiety.  Dispense: 30 Tablet; Refill: 2    3. Family problems  Dr. Patel  - Referral to Psychology  - Referral to Behavioral Health    4. Epicondylitis, lateral, left  Dr. Briscoe  - Referral to Pain Clinic    5. Neck pain    - Referral to Pain Clinic  - methocarbamol (ROBAXIN) 500 MG Tab; Take 1 Tablet by mouth 3 times a day as needed (neck pain).  Dispense: 30 Tablet; Refill: 1    6. Elevated LDL cholesterol level  Last lab results were reviewed by me today and discussed w patient.    - CT-CARDIAC SCORING; Future              F/U prn

## 2023-12-14 ENCOUNTER — HOSPITAL ENCOUNTER (OUTPATIENT)
Dept: LAB | Facility: MEDICAL CENTER | Age: 58
End: 2023-12-14
Attending: PHYSICIAN ASSISTANT
Payer: COMMERCIAL

## 2023-12-14 ENCOUNTER — HOSPITAL ENCOUNTER (OUTPATIENT)
Dept: RADIOLOGY | Facility: MEDICAL CENTER | Age: 58
End: 2023-12-14
Attending: PHYSICIAN ASSISTANT
Payer: COMMERCIAL

## 2023-12-14 DIAGNOSIS — E78.00 ELEVATED LDL CHOLESTEROL LEVEL: ICD-10-CM

## 2023-12-14 DIAGNOSIS — Z00.00 PREVENTATIVE HEALTH CARE: ICD-10-CM

## 2023-12-14 LAB
25(OH)D3 SERPL-MCNC: 103 NG/ML (ref 30–100)
ALBUMIN SERPL BCP-MCNC: 4.6 G/DL (ref 3.2–4.9)
ALBUMIN/GLOB SERPL: 1.6 G/DL
ALP SERPL-CCNC: 55 U/L (ref 30–99)
ALT SERPL-CCNC: 15 U/L (ref 2–50)
ANION GAP SERPL CALC-SCNC: 8 MMOL/L (ref 7–16)
AST SERPL-CCNC: 15 U/L (ref 12–45)
BASOPHILS # BLD AUTO: 0.9 % (ref 0–1.8)
BASOPHILS # BLD: 0.04 K/UL (ref 0–0.12)
BILIRUB SERPL-MCNC: 0.3 MG/DL (ref 0.1–1.5)
BUN SERPL-MCNC: 12 MG/DL (ref 8–22)
CALCIUM ALBUM COR SERPL-MCNC: 9.2 MG/DL (ref 8.5–10.5)
CALCIUM SERPL-MCNC: 9.7 MG/DL (ref 8.5–10.5)
CHLORIDE SERPL-SCNC: 101 MMOL/L (ref 96–112)
CHOLEST SERPL-MCNC: 253 MG/DL (ref 100–199)
CO2 SERPL-SCNC: 29 MMOL/L (ref 20–33)
CREAT SERPL-MCNC: 0.78 MG/DL (ref 0.5–1.4)
EOSINOPHIL # BLD AUTO: 0.05 K/UL (ref 0–0.51)
EOSINOPHIL NFR BLD: 1.1 % (ref 0–6.9)
ERYTHROCYTE [DISTWIDTH] IN BLOOD BY AUTOMATED COUNT: 46.8 FL (ref 35.9–50)
GFR SERPLBLD CREATININE-BSD FMLA CKD-EPI: 86 ML/MIN/1.73 M 2
GLOBULIN SER CALC-MCNC: 2.9 G/DL (ref 1.9–3.5)
GLUCOSE SERPL-MCNC: 89 MG/DL (ref 65–99)
HCT VFR BLD AUTO: 42.6 % (ref 37–47)
HDLC SERPL-MCNC: 91 MG/DL
HGB BLD-MCNC: 14 G/DL (ref 12–16)
IMM GRANULOCYTES # BLD AUTO: 0 K/UL (ref 0–0.11)
IMM GRANULOCYTES NFR BLD AUTO: 0 % (ref 0–0.9)
LDLC SERPL CALC-MCNC: 152 MG/DL
LYMPHOCYTES # BLD AUTO: 1.68 K/UL (ref 1–4.8)
LYMPHOCYTES NFR BLD: 38.4 % (ref 22–41)
MCH RBC QN AUTO: 31 PG (ref 27–33)
MCHC RBC AUTO-ENTMCNC: 32.9 G/DL (ref 32.2–35.5)
MCV RBC AUTO: 94.2 FL (ref 81.4–97.8)
MONOCYTES # BLD AUTO: 0.44 K/UL (ref 0–0.85)
MONOCYTES NFR BLD AUTO: 10 % (ref 0–13.4)
NEUTROPHILS # BLD AUTO: 2.17 K/UL (ref 1.82–7.42)
NEUTROPHILS NFR BLD: 49.6 % (ref 44–72)
NRBC # BLD AUTO: 0 K/UL
NRBC BLD-RTO: 0 /100 WBC (ref 0–0.2)
PLATELET # BLD AUTO: 281 K/UL (ref 164–446)
PMV BLD AUTO: 10.8 FL (ref 9–12.9)
POTASSIUM SERPL-SCNC: 4 MMOL/L (ref 3.6–5.5)
PROT SERPL-MCNC: 7.5 G/DL (ref 6–8.2)
RBC # BLD AUTO: 4.52 M/UL (ref 4.2–5.4)
SODIUM SERPL-SCNC: 138 MMOL/L (ref 135–145)
TRIGL SERPL-MCNC: 49 MG/DL (ref 0–149)
TSH SERPL DL<=0.005 MIU/L-ACNC: 1.85 UIU/ML (ref 0.38–5.33)
WBC # BLD AUTO: 4.4 K/UL (ref 4.8–10.8)

## 2023-12-14 PROCEDURE — 85025 COMPLETE CBC W/AUTO DIFF WBC: CPT

## 2023-12-14 PROCEDURE — 36415 COLL VENOUS BLD VENIPUNCTURE: CPT

## 2023-12-14 PROCEDURE — 80053 COMPREHEN METABOLIC PANEL: CPT

## 2023-12-14 PROCEDURE — 80061 LIPID PANEL: CPT

## 2023-12-14 PROCEDURE — 82306 VITAMIN D 25 HYDROXY: CPT

## 2023-12-14 PROCEDURE — 84443 ASSAY THYROID STIM HORMONE: CPT

## 2023-12-14 PROCEDURE — 4410556 CT-CARDIAC SCORING (SELF PAY ONLY)

## 2024-01-04 ENCOUNTER — OFFICE VISIT (OUTPATIENT)
Dept: PHYSICAL MEDICINE AND REHAB | Facility: MEDICAL CENTER | Age: 59
End: 2024-01-04
Payer: COMMERCIAL

## 2024-01-04 VITALS
SYSTOLIC BLOOD PRESSURE: 118 MMHG | WEIGHT: 127 LBS | TEMPERATURE: 97 F | HEART RATE: 81 BPM | OXYGEN SATURATION: 98 % | BODY MASS INDEX: 21.16 KG/M2 | HEIGHT: 65 IN | DIASTOLIC BLOOD PRESSURE: 80 MMHG

## 2024-01-04 DIAGNOSIS — M25.522 CHRONIC ELBOW PAIN, LEFT: ICD-10-CM

## 2024-01-04 DIAGNOSIS — G89.29 CHRONIC ELBOW PAIN, LEFT: ICD-10-CM

## 2024-01-04 DIAGNOSIS — M47.812 CERVICAL SPONDYLOSIS: ICD-10-CM

## 2024-01-04 DIAGNOSIS — M79.10 MYALGIA: ICD-10-CM

## 2024-01-04 DIAGNOSIS — M54.12 CERVICAL RADICULOPATHY: ICD-10-CM

## 2024-01-04 DIAGNOSIS — M48.02 CERVICAL SPINAL STENOSIS: ICD-10-CM

## 2024-01-04 PROCEDURE — 99214 OFFICE O/P EST MOD 30 MIN: CPT | Performed by: PHYSICAL MEDICINE & REHABILITATION

## 2024-01-04 PROCEDURE — 3074F SYST BP LT 130 MM HG: CPT | Performed by: PHYSICAL MEDICINE & REHABILITATION

## 2024-01-04 PROCEDURE — 1125F AMNT PAIN NOTED PAIN PRSNT: CPT | Performed by: PHYSICAL MEDICINE & REHABILITATION

## 2024-01-04 PROCEDURE — 3079F DIAST BP 80-89 MM HG: CPT | Performed by: PHYSICAL MEDICINE & REHABILITATION

## 2024-01-04 ASSESSMENT — PAIN SCALES - GENERAL: PAINLEVEL: 1=MINIMAL PAIN

## 2024-01-04 ASSESSMENT — PATIENT HEALTH QUESTIONNAIRE - PHQ9: CLINICAL INTERPRETATION OF PHQ2 SCORE: 0

## 2024-01-04 ASSESSMENT — FIBROSIS 4 INDEX: FIB4 SCORE: 0.83

## 2024-01-04 NOTE — PROGRESS NOTES
Follow up patient note  Interventional spine and Pain  Physiatry (physical medicine and  Rehabilitation)       Chief complaint:   Chief Complaint   Patient presents with    Neck Pain          HISTORY    Please see new patient note by Dr Briscoe,  for more details.     HPI  Patient identification: Christa Agustín Juarez MD ,  1965,   With Diagnoses of Myalgia, likely weaker back muscles, Cervical radiculopathy, stable, Cervical spondylosis , stable, Cervical spinal stenosis, stable, and Chronic elbow pain, left. no signs of tendonitis. were pertinent to this visit.     Chronic left elbow pain for years. Pain is worse on the posterior aspect of the elbow and lateral elbow. She saw orthopedic surgeon Dr Menezes. She had a triceps injection done by Dr Randolph. She was previously doing push ups and yoga. She has been unable to do exercises because of elbow pain.         ROS Red Flags :   Fever, Chills, Sweats: Denies  Involuntary Weight Loss: Denies  Bowel/Bladder Incontinence: Denies  Saddle Anesthesia: Denies        PMHx:   Past Medical History:   Diagnosis Date    Anesthesia     Nausea in recovery    Arthritis     High cholesterol        PSHx:   Past Surgical History:   Procedure Laterality Date    BONE BIOPSY Left 2016    Procedure: WRIST BIOPSY WITH POSSIBLE BONE GRAFT ALLO ;  Surgeon: Benjamin Menezes M.D.;  Location: SURGERY [facility];  Service:     BLEPHAROPLASTY  2008    CHOLECYSTECTOMY      PRIMARY C SECTION  1998           Family history   Family History   Problem Relation Age of Onset    Cancer Other     Cancer Paternal Aunt     Arthritis Mother     Hyperlipidemia Mother     Arthritis Brother     Cancer Brother         Prostate         Medications:   Outpatient Medications Marked as Taking for the 24 encounter (Office Visit) with Akira Briscoe M.D.   Medication Sig Dispense Refill    propranolol (INDERAL) 20 MG Tab Take 1 Tablet by mouth 30 min before the event for  anticipatory anxiety. 30 Tablet 2    methocarbamol (ROBAXIN) 500 MG Tab Take 1 Tablet by mouth 3 times a day as needed (neck pain). 30 Tablet 1    LORazepam (ATIVAN) 1 MG Tab Take 1 Tablet by mouth 1 time a day as needed for Anxiety for up to 30 days. 30 Tablet 0    sumatriptan (IMITREX) 100 MG tablet TAKE 1 TABLET BY MOUTH EVERY DAY AS NEEDED FOR HEAD ACHE      meloxicam (MOBIC) 15 MG tablet Take 1 Tablet by mouth 1 time a day as needed (pain). Do not take other NSAIDs. No refills. 60 Tablet 1    Calcium-Magnesium-Vitamin D (CALCIUM MAGNESIUM PO) Take  by mouth every day.          Current Outpatient Medications on File Prior to Visit   Medication Sig Dispense Refill    propranolol (INDERAL) 20 MG Tab Take 1 Tablet by mouth 30 min before the event for anticipatory anxiety. 30 Tablet 2    methocarbamol (ROBAXIN) 500 MG Tab Take 1 Tablet by mouth 3 times a day as needed (neck pain). 30 Tablet 1    LORazepam (ATIVAN) 1 MG Tab Take 1 Tablet by mouth 1 time a day as needed for Anxiety for up to 30 days. 30 Tablet 0    sumatriptan (IMITREX) 100 MG tablet TAKE 1 TABLET BY MOUTH EVERY DAY AS NEEDED FOR HEAD ACHE      meloxicam (MOBIC) 15 MG tablet Take 1 Tablet by mouth 1 time a day as needed (pain). Do not take other NSAIDs. No refills. 60 Tablet 1    Calcium-Magnesium-Vitamin D (CALCIUM MAGNESIUM PO) Take  by mouth every day.      ondansetron (ZOFRAN) 4 MG Tab tablet TAKE 1 TABLET BY MOUTH TWICE A DAY AS NEEDED FOR NAUSEA AND VOMITING      gabapentin (NEURONTIN) 100 MG Cap Take 1-3 Capsules by mouth at bedtime as needed (pain). 90 Capsule 3    COVID-19mRNA Bival Vacc Pfizer (PFIZER COVID-19 BIVALENT) 30 MCG/0.3ML Suspension injection Inject  into the shoulder, thigh, or buttocks. (Patient not taking: Reported on 4/24/2023) 0.3 mL 0     No current facility-administered medications on file prior to visit.         Allergies:   Allergies   Allergen Reactions    Penicillins Hives    Diagnostic X-Ray Materials Hives, Itching  and Rash    Zanaflex [Tizanidine]      headache       Social Hx:   Social History     Socioeconomic History    Marital status:      Spouse name: Not on file    Number of children: Not on file    Years of education: Not on file    Highest education level: Professional school degree (e.g., MD, DDS, DVM, AMISHA)   Occupational History    Not on file   Tobacco Use    Smoking status: Never    Smokeless tobacco: Never   Vaping Use    Vaping Use: Never used   Substance and Sexual Activity    Alcohol use: Yes     Comment: Occasional    Drug use: No    Sexual activity: Yes     Comment: Radiation Oncologist   Other Topics Concern    Not on file   Social History Narrative    Not on file     Social Determinants of Health     Financial Resource Strain: Low Risk  (8/2/2020)    Overall Financial Resource Strain (CARDIA)     Difficulty of Paying Living Expenses: Not hard at all   Food Insecurity: No Food Insecurity (8/2/2020)    Hunger Vital Sign     Worried About Running Out of Food in the Last Year: Never true     Ran Out of Food in the Last Year: Never true   Transportation Needs: No Transportation Needs (8/2/2020)    PRAPARE - Transportation     Lack of Transportation (Medical): No     Lack of Transportation (Non-Medical): No   Physical Activity: Sufficiently Active (8/2/2020)    Exercise Vital Sign     Days of Exercise per Week: 4 days     Minutes of Exercise per Session: 50 min   Stress: No Stress Concern Present (8/2/2020)    [place] [employer] - Occupational Stress Questionnaire     Feeling of Stress : Not at all   Social Connections: Moderately Isolated (8/2/2020)    Social Connection and Isolation Panel [NHANES]     Frequency of Communication with Friends and Family: More than three times a week     Frequency of Social Gatherings with Friends and Family: Three times a week     Attends [employer] Services: Never     Active Member of Clubs or Organizations: No     Attends Club or Organization  "Meetings: Never     Marital Status:    Intimate Partner Violence: Not on file   Housing Stability: Low Risk  (8/2/2020)    Housing Stability Vital Sign     Unable to Pay for Housing in the Last Year: No     Number of Places Lived in the Last Year: 1     Unstable Housing in the Last Year: No         EXAMINATION     Physical Exam:   Vitals: /80 (BP Location: Left arm, Patient Position: Sitting, BP Cuff Size: Adult)   Pulse 81   Temp 36.1 °C (97 °F) (Temporal)   Ht 1.651 m (5' 5\")   Wt 57.6 kg (127 lb)   SpO2 98%     Constitutional:   Body Habitus: Body mass index is 21.13 kg/m².  Cooperation: Fully cooperates with exam  Appearance: Well-groomed no disheveled    Respiratory-  breathing comfortable on room air, no audible wheezing  Cardiovascular- capillary refills less than 2 seconds. No lower extremity edema is noted.   Psychiatric- alert and oriented ×3. Normal affect.    MSK and Neuro: -    left elbow exam  Inspection: No rashes, swelling or deformities. No swelling around the olecranon.  Palpation:  No tenderness palpation to the lateral epicondyle, medial epicondyle, olecranon, cubital fossa, common flexor tendon.  Range of motion: Normal in the elbow in flexion and extension.  Special tests:  Resisted wrist extension (Cozens test): negative  Resisted wrist flexion: Negative     Cervical spine    full  active range of motion with flexion, lateral flexion, and rotation bilaterally.   There is full  active range of motion with cervical extension.      Palpation:   Tenderness to palpation throughout the cervical spine: negative bilaterally        Cervical spine Special tests  Neuro tension  Spurling's maneuver negative bilaterally    Cervical facet loading maneuver  negative bilaterally        Key points for the international standards for neurological classification of spinal cord injury (ISNCSCI) to light touch.     Dermatome R L   C4 2 2   C5 2 2   C6 2 2   C7 2 2   C8 2 2   T1 2 2   T2 2 2      " "                                   Motor Exam Upper Extremities   ? Myotome R L   Shoulder flexion C5 5 5   Elbow flexion C5 5 5   Wrist extension C6 5 5   Elbow extension C7 5 5   Finger flexion C8 5 5   Finger abduction T1 5 5           MEDICAL DECISION MAKING    DATA    Labs:   Lab Results   Component Value Date/Time    SODIUM 138 12/14/2023 08:15 AM    POTASSIUM 4.0 12/14/2023 08:15 AM    CHLORIDE 101 12/14/2023 08:15 AM    CO2 29 12/14/2023 08:15 AM    GLUCOSE 89 12/14/2023 08:15 AM    BUN 12 12/14/2023 08:15 AM    CREATININE 0.78 12/14/2023 08:15 AM        No results found for: \"PROTHROMBTM\", \"INR\"     Lab Results   Component Value Date/Time    WBC 4.4 (L) 12/14/2023 08:15 AM    RBC 4.52 12/14/2023 08:15 AM    HEMOGLOBIN 14.0 12/14/2023 08:15 AM    HEMATOCRIT 42.6 12/14/2023 08:15 AM    MCV 94.2 12/14/2023 08:15 AM    MCH 31.0 12/14/2023 08:15 AM    MCHC 32.9 12/14/2023 08:15 AM    MPV 10.8 12/14/2023 08:15 AM    NEUTSPOLYS 49.60 12/14/2023 08:15 AM    LYMPHOCYTES 38.40 12/14/2023 08:15 AM    MONOCYTES 10.00 12/14/2023 08:15 AM    EOSINOPHILS 1.10 12/14/2023 08:15 AM    BASOPHILS 0.90 12/14/2023 08:15 AM        No results found for: \"HBA1C\"       Imaging:   I personally reviewed following images    MRI cervical spine 2/20/2023  T2 subacute compression fracture.  Multilevel degenerative changes with facet arthropathy and foraminal stenosis.    MRI left elbow 11/3/2023      I reviewed the following radiology reports                     Results for orders placed in visit on 02/24/23    MR-CERVICAL SPINE-W/O  FINDINGS:  There is subacute fracture along the anterior superior corner of T2 vertebral body. Bone marrow edema is noted along the superior portion. There is no disruption of the posterior cortex. The anterior and posterior longitudinal ligaments are intact. There   is no epidural hemorrhage. There is no evidence of spinal cord injury.     The cervical spine maintains normal height and alignment. There is no " fracture or dislocation. There is no pathologic marrow infiltration. There is no focal osseous lesion.     At the level of C2-3,there is no spinal or neural foraminal stenosis.     At the level of C3-4,there is minimal disc bulge. There is no spinal or neural foraminal stenosis.     At the level of C4-5,there is disc degeneration. There is diffuse disc bulge. Bilateral uncinate joint arthropathy is seen. There is effacement of the ventral subarachnoid space. There is severe right and mild left neural foraminal stenosis.     At the level of C5-6,there is disc degeneration. Bilateral uncinate joint arthropathy is seen. There is severe bilateral neural foraminal stenosis. There is effacement of the ventral subarachnoid space. There is flattening of the anterior spinal cord   contour. There is mild central canal stenosis.     At the level of C6-7,there is disc degeneration. There is diffuse disc bulge. Bilateral uncinate joint arthropathy is seen. There is moderate bilateral neural foraminal stenosis. There is no central canal stenosis.     At the level of C7-T1,there is no spinal or neural foraminal stenosis.     The visualized posterior fossa structures appear normal within limits.  The cervical spinal cord does not demonstrate any mass or abnormal T2 signal intensity.     There is tiny 4 mm sized left thyroid nodule.    Impression  1.  There is subacute fracture along the anterior superior corner of T2 vertebral body. Bone marrow edema is noted along the superior portion. There is no disruption of the posterior cortex. The anterior and posterior longitudinal ligaments are intact.  There is no epidural hemorrhage. This is new since the previous study. There is no evidence of spinal cord injury.  2.  Multifocal degenerative disease in the cervical spine as described above. There has been no significant interval change.  3.  There is tiny 4 mm sized left thyroid nodule stable since the previous study.      Results for  orders placed during the hospital encounter of 12/20/19    MR-LUMBAR SPINE-W/O    Impression  1.  Mild degenerative changes of the lumbar spine  2.  No areas of high-grade central canal or neural foraminal narrowing  3.  Mild LEFT neural foraminal narrowing at L4-L5  4.  Segment 1 hepatic lesion, incompletely assessed but likely a cyst or hemangioma        Results for orders placed during the hospital encounter of 12/20/19    MR-LUMBAR SPINE-W/O    Impression  1.  Mild degenerative changes of the lumbar spine  2.  No areas of high-grade central canal or neural foraminal narrowing  3.  Mild LEFT neural foraminal narrowing at L4-L5  4.  Segment 1 hepatic lesion, incompletely assessed but likely a cyst or hemangioma                                                                       Results for orders placed during the hospital encounter of 11/11/22    CT-ABDOMEN-PELVIS WITH    Impression  1.  There is no evidence of bowel obstruction or acute inflammation.  2.  There is air in the splenic flexure and descending colon and stool in the right colon but there is no colonic dilatation.  3.  No extraluminal air or free intraperitoneal air.  4.  No abnormal vascular enhancement.  5.  Gallbladder is surgically absent with probable physiologic dilatation of the intrahepatic and extra hepatic biliary tree. No obstructive mass or stone.  6.  Normal appendix.  7.  Incidental benign hepatic hemangiomas.                    Results for orders placed in visit on 02/23/23    DX-CERVICAL SPINE-2 OR 3 VIEWS    Impression  1.  Multilevel facet arthropathy and endplate spurring    2.  Minimal posterior subluxations at C4-5 and C5-6        Results for orders placed in visit on 03/30/23    DX-ELBOW-COMPLETE 3+ LEFT      Results for orders placed in visit on 07/25/22    DX-FOOT-COMPLETE 3+ RIGHT                               Results for orders placed in visit on 11/19/21    DX-WRIST-COMPLETE 3+ LEFT         DIAGNOSIS   Visit Diagnoses      ICD-10-CM   1. Myalgia, likely weaker back muscles  M79.10   2. Cervical radiculopathy, stable  M54.12   3. Cervical spondylosis , stable  M47.812   4. Cervical spinal stenosis, stable  M48.02   5. Chronic elbow pain, left. no signs of tendonitis.  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    Diagnoses and all orders for this visit:    Myalgia, likely weaker back muscles  -     Referral to Physical Therapy    Cervical radiculopathy, stable  -     Referral to Physical Therapy    Cervical spondylosis , stable  -     Referral to Physical Therapy    Cervical spinal stenosis, stable  -     Referral to Physical Therapy    Chronic elbow pain, left. no signs of tendonitis.  -     Referral to Physical Therapy      We discussed additions to the home exercise program.     Medications: continue gabapentin as needed    Follow up: as needed with me      Thank you for allowing me to participate in the care of this patient. If you have any questions please not hesitate to contact me.             Please note that this dictation was created using voice recognition software. I have made every reasonable attempt to correct obvious errors but there may be errors of grammar and content that I may have overlooked prior to finalization of this note.      Akira Briscoe MD  Interventional Spine and Sports Physiatry  Physical Medicine and Rehabilitation  [facility]

## 2024-01-04 NOTE — PATIENT INSTRUCTIONS
Slow down your lifts with lighter weights but more time under tension on the muscles.  Focus on perfect form.  Slow down the lift both in eccentric and concentric motions.    Include dead hangs and build up to 1 minute of dead hang on a pull-up bar.  After this you can incorporate active and 1 arm movements.    Prior to pickleball play even in recreational play, remember to warm up significantly and treat this like it is a sport and you are playing in a game

## 2024-01-06 DIAGNOSIS — F41.8 ANTICIPATORY ANXIETY: ICD-10-CM

## 2024-01-09 RX ORDER — PROPRANOLOL HYDROCHLORIDE 20 MG/1
TABLET ORAL
Qty: 90 TABLET | Refills: 0 | Status: SHIPPED | OUTPATIENT
Start: 2024-01-09

## 2024-01-18 ENCOUNTER — PHYSICAL THERAPY (OUTPATIENT)
Dept: PHYSICAL THERAPY | Facility: MEDICAL CENTER | Age: 59
End: 2024-01-18
Attending: PHYSICAL MEDICINE & REHABILITATION
Payer: COMMERCIAL

## 2024-01-18 DIAGNOSIS — M79.10 MYALGIA: ICD-10-CM

## 2024-01-18 DIAGNOSIS — M54.12 CERVICAL RADICULOPATHY: ICD-10-CM

## 2024-01-18 DIAGNOSIS — M47.812 CERVICAL SPONDYLOSIS: ICD-10-CM

## 2024-01-18 DIAGNOSIS — G89.29 CHRONIC ELBOW PAIN, LEFT: ICD-10-CM

## 2024-01-18 DIAGNOSIS — M25.522 CHRONIC ELBOW PAIN, LEFT: ICD-10-CM

## 2024-01-18 DIAGNOSIS — M48.02 CERVICAL SPINAL STENOSIS: ICD-10-CM

## 2024-01-18 PROCEDURE — 97012 MECHANICAL TRACTION THERAPY: CPT

## 2024-01-18 PROCEDURE — 97161 PT EVAL LOW COMPLEX 20 MIN: CPT

## 2024-01-18 ASSESSMENT — ENCOUNTER SYMPTOMS
ALLEVIATING FACTORS: PHYSICAL THERAPY
PAIN TIMING: IN THE EVENING
PAIN SCALE AT LOWEST: 1
QUALITY: SHOOTING
PAIN TIMING: INTERMITTENT
EXACERBATED BY: ACTIVITY
PAIN SCALE: 4
EXACERBATED BY: LIFTING
ALLEVIATING FACTORS: HOME EXERCISE PROGRAM
ALLEVIATING FACTORS: STRETCHING
QUALITY: ACHING
PAIN TIMING: IN THE MORNING
QUALITY: DISCOMFORT
QUALITY: SHARP
QUALITY: DULL ACHE
PAIN SCALE AT HIGHEST: 7
EXACERBATED BY: SLEEPING

## 2024-01-19 NOTE — OP THERAPY EVALUATION
Outpatient Physical Therapy  INITIAL EVALUATION    [facility]  [address]  [place] [address]  Phone:  [phone]  Fax:  [phone]    Date of Evaluation: 2024    Patient: Christa Juarez MD  YOB: 1965  MRN: [id number]     Referring Provider: Akira Briscoe M.D.  [address]  [place] [address]   Referring Diagnosis Cervical radiculopathy [M54.12];Cervical spondylosis [M47.812];Cervical spinal stenosis [M48.02];Chronic elbow pain, left [M25.522, G89.29];Myalgia [M79.10]     Time Calculation  Start time: 1116  Stop time: 1200 Time Calculation (min): 44 minutes         Chief Complaint: Neck Pain    Visit Diagnoses     ICD-10-CM   1. Cervical radiculopathy  M54.12   2. Cervical spondylosis  M47.812   3. Cervical spinal stenosis  M48.02   4. Chronic elbow pain, left  M25.522    G89.29   5. Myalgia  M79.10       Date of onset of impairment: 2023    Subjective:   History of Present Illness:     Date of onset:  2023    Mechanism of injury:  Pt returns to  for a similar chief as last year. Pt reports he life has become very busy, as a result her exercises became inconsistent and she is in hopes to get stronger and improve on her neck pain. Pt does have reports of median and ulnar n distribution symptoms down her arm and into her hand.   Sleep disturbance:  Interrupted sleep  Pain:     Current pain ratin    At best pain ratin    At worst pain ratin    Quality:  Sharp, shooting, discomfort, dull ache and aching    Pain timing:  In the evening, in the morning and intermittent    Relieving factors:  Stretching, home exercise program and physical therapy    Aggravating factors:  Activity, lifting and sleeping    Progression:  Worsening  Social Support:     Lives with:  Spouse  Hand dominance:  Right  Treatments:     Previous treatment:  Physical therapy  Patient Goals:     Patient  goals for therapy:  Decreased pain      Past Medical History:   Diagnosis Date    Anesthesia     Nausea in recovery    Arthritis     High cholesterol      Past Surgical History:   Procedure Laterality Date    BONE BIOPSY Left 2016    Procedure: WRIST BIOPSY WITH POSSIBLE BONE GRAFT ALLO ;  Surgeon: Benjamin Menezes M.D.;  Location: SURGERY [facility];  Service:     BLEPHAROPLASTY  2008    CHOLECYSTECTOMY      PRIMARY C SECTION  1998         Social History     Tobacco Use    Smoking status: Never    Smokeless tobacco: Never   Substance Use Topics    Alcohol use: Yes     Comment: Occasional     Family and Occupational History     Socioeconomic History    Marital status:      Spouse name: Not on file    Number of children: Not on file    Years of education: Not on file    Highest education level: Professional school degree (e.g., MD, DDS, DVM, AMISHA)   Occupational History    Not on file       Objective     Postural Observations  Seated posture: fair  Standing posture: fair  Correction of posture: makes symptoms better      Shoulder Screen    Shoulder active range of motion within functional limits.  Shoulder strength within functional limits.    Active Range of Motion     Cervical Spine   Flexion: within functional limits  Extension: decreased  Left lateral flexion: decreased  Right lateral flexion: decreased  Left rotation: decreased  Right rotation: decreased    Upper Cervical   Flexion: within functional limits  Extension: decreased  Left lateral flexion: decreased  Right lateral flexion: decreased  Left rotation: decreased  Right rotation: decreased    Passive Range of Motion     Cervical Spine   Flexion: within functional limits  Extension: within functional limits  Retraction: within functional limits  Left lateral flexion: within functional limits  Right lateral flexion: within functional limits  Left rotation: within functional limits  Right rotation: within functional  limits    Upper Cervical   Flexion: within functional limits  Extension: within functional limits  Left lateral flexion: within functional limits  Right lateral flexion: within functional limits  Left rotation: within functional limits  Right rotation: within functional limits    Tests   Cervical spine   Positive cervical spine distraction.    Left Shoulder   Positive Spurling's sign.         Therapeutic Exercises (CPT 88981):     1. SNAG's, HEP    2. Rows, HEP    3. Extensions, HEP      Therapeutic Exercise Summary: Pt was educated today regarding current condition, expectations for rehab potential and appropriate exercise program for home. HEP was given to patient in print out form and instructions were communicated to pt.       Therapeutic Treatments and Modalities:     1. Mechanical Traction (CPT 98578), cervical, 12#/8 15 mins    Time-based treatments/modalities:           Assessment, Response and Plan:   Impairments: activity intolerance, hypersensitivity and limited mobility    Assessment details:  Pt is a 57 y/o F presenting to PT with signs and symptoms consistent with cervicalgia and radicular symptoms consistent with degenerative and stenotic changes of the cervical spine. Pt has deficits of generalized posterior extensor weakness, cervical and UE discomfort, decreased AROM of cervical spine. Given pt's age, overall health, current condition and adherence to PT recommendations, anticipated duration of episode is 8 weeks.      Prognosis: fair    Goals:   Short Term Goals:   1. Pt will achieve 7 or lower on NDI  2. Pt will achieve AROM of cervical spine rotation WNL's  Short term goal time span:  2-4 weeks      Long Term Goals:    1. Pt will achieve 5 or lower on NDI  2. Pt will be able to return to all recreational and ADL's pain free or near pain free (2/10 or lower on numeric pain rating scale)    Long term goal time span:  6-8 weeks    Plan:   Therapy options:  Physical therapy treatment to  continue  Planned therapy interventions:  Neuromuscular Re-education (CPT 02066), Mechanical Traction (CPT 82723), Manual Therapy (CPT 92428), Therapeutic Activities (CPT 58461) and Therapeutic Exercise (CPT 77683)  Frequency:  2x week  Duration in weeks:  8  Discussed with:  Patient      Functional Assessment Used        Referring provider co-signature:  I have reviewed this plan of care and my co-signature certifies the need for services.    Certification Period: 01/18/2024 to  03/18/24    Physician Signature: ________________________________ Date: ______________

## 2024-01-25 ENCOUNTER — PHYSICAL THERAPY (OUTPATIENT)
Dept: PHYSICAL THERAPY | Facility: MEDICAL CENTER | Age: 59
End: 2024-01-25
Attending: PHYSICAL MEDICINE & REHABILITATION
Payer: COMMERCIAL

## 2024-01-25 DIAGNOSIS — M54.12 CERVICAL RADICULOPATHY: ICD-10-CM

## 2024-01-25 PROCEDURE — 97110 THERAPEUTIC EXERCISES: CPT

## 2024-01-25 PROCEDURE — 97530 THERAPEUTIC ACTIVITIES: CPT

## 2024-01-25 PROCEDURE — 97140 MANUAL THERAPY 1/> REGIONS: CPT

## 2024-01-25 NOTE — OP THERAPY DAILY TREATMENT
"  Outpatient Physical Therapy  DAILY TREATMENT     [facility] Outpatient Physical Therapy  [address]  [place] [address]  Phone:  [phone]  Fax:  [phone]    Date: 01/25/2024    Patient: Christa Juarez MD  YOB: 1965  MRN: [id number]     Time Calculation    Start time: 1202  Stop time: 1240 Time Calculation (min): 38 minutes         Chief Complaint: Neck Pain    Visit #: 2    SUBJECTIVE:  Pt reports her elbow is getting slightly better, however notes self-awareness that she may have overdone some of the exercises at home, contributing to a flare of symptoms.           Therapeutic Exercises (CPT 93470):     1. SNAG's, Reviewed, HEP    2. Rows, BTB x15, HEP    3. Extensions, BTB x10, HEP    4. Levator scap stretch, 3x 15\" B/L, HEP      Therapeutic Exercise Summary: Pt was educated today regarding current condition, expectations for rehab potential and appropriate exercise program for home. HEP was given to patient in print out form and instructions were communicated to pt.       Therapeutic Treatments and Modalities:     1. Therapeutic Activities (CPT 31388), review of johnson traction unit for home    2. Manual Therapy (CPT 08245), cervical spine, STM paraspinals, Lateral glides B/L    Time-based treatments/modalities:    Physical Therapy Timed Treatment Charges  Manual therapy minutes (CPT 71498): 10 minutes  Therapeutic activity minutes (CPT 98738): 15 minutes  Therapeutic exercise minutes (CPT 76590): 13 minutes      Pain rating (1-10) before treatment:  2      ASSESSMENT:   Response to treatment: Pt had good tolerance for today's PT session. Pt is progressing appropriately. Pt will continue to benefit from skilled PT to address aforementioned deficits.      PLAN/RECOMMENDATIONS:   Plan for treatment: therapy treatment to continue next visit.  Planned interventions for next visit: continue with current treatment, manual therapy (CPT 06424), " neuromuscular re-education (CPT 91625), therapeutic activities (CPT 11296), and therapeutic exercise (CPT 67536).

## 2024-02-02 ENCOUNTER — PHYSICAL THERAPY (OUTPATIENT)
Dept: PHYSICAL THERAPY | Facility: MEDICAL CENTER | Age: 59
End: 2024-02-02
Attending: PHYSICAL MEDICINE & REHABILITATION
Payer: COMMERCIAL

## 2024-02-02 DIAGNOSIS — M54.12 CERVICAL RADICULOPATHY: ICD-10-CM

## 2024-02-02 PROCEDURE — 97110 THERAPEUTIC EXERCISES: CPT

## 2024-02-02 PROCEDURE — 97140 MANUAL THERAPY 1/> REGIONS: CPT

## 2024-02-02 NOTE — OP THERAPY DAILY TREATMENT
Outpatient Physical Therapy  DAILY TREATMENT     [facility] Outpatient Physical Therapy  [address]  [place] [address]  Phone:  [phone]  Fax:  [phone]    Date: 02/02/2024    Patient: Christa Juarez MD  YOB: 1965  MRN: [id number]     Time Calculation                   Chief Complaint: Neck Pain    Visit #: 3    SUBJECTIVE:  Pt reports some improvement in neck pain generally, especially when she is consistently using her home traction device. Pt also reports that during row activity her elbow pain is increased.             Therapeutic Exercises (CPT 65667):     1. SNAG's, Reviewed, HEP    2. Rows, Reviewed, HEP    3. Extensions, NT, HEP    4. Levator scap stretch, Reviewed - No overpressure as needed, HEP    5. Tricep extensions with slow eccentric phase, PTB 2x10, HEP      Therapeutic Exercise Summary: Pt was educated today regarding current condition, expectations for rehab potential and appropriate exercise program for home. HEP was given to patient in print out form and instructions were communicated to pt.       Therapeutic Treatments and Modalities:     1. Manual Therapy (CPT 32247), L elbow, STM and graston to triceps insertion as well as medial epicondyle bundle.    Time-based treatments/modalities:           Pain rating (1-10) before treatment:  2    ASSESSMENT:   Response to treatment: Today's session focused on addressing elbow pain as it relates to management neck and upper thoracic symptoms. Pt will continue to benefit from skilled PT to address aforementioned deficits.     PLAN/RECOMMENDATIONS:   Plan for treatment: therapy treatment to continue next visit.  Planned interventions for next visit: continue with current treatment, manual therapy (CPT 67098), therapeutic activities (CPT 11041), and therapeutic exercise (CPT 37319).

## 2024-02-06 ENCOUNTER — HOSPITAL ENCOUNTER (OUTPATIENT)
Dept: RADIOLOGY | Facility: MEDICAL CENTER | Age: 59
End: 2024-02-06
Attending: FAMILY MEDICINE
Payer: COMMERCIAL

## 2024-02-06 DIAGNOSIS — Z12.31 VISIT FOR SCREENING MAMMOGRAM: ICD-10-CM

## 2024-02-06 PROCEDURE — 77067 SCR MAMMO BI INCL CAD: CPT

## 2024-02-08 ENCOUNTER — PHYSICAL THERAPY (OUTPATIENT)
Dept: PHYSICAL THERAPY | Facility: MEDICAL CENTER | Age: 59
End: 2024-02-08
Attending: FAMILY MEDICINE
Payer: COMMERCIAL

## 2024-02-08 DIAGNOSIS — M54.12 CERVICAL RADICULOPATHY: ICD-10-CM

## 2024-02-08 PROCEDURE — 97012 MECHANICAL TRACTION THERAPY: CPT

## 2024-02-08 PROCEDURE — 97140 MANUAL THERAPY 1/> REGIONS: CPT

## 2024-02-09 ENCOUNTER — TELEPHONE (OUTPATIENT)
Dept: MEDICAL GROUP | Facility: MEDICAL CENTER | Age: 59
End: 2024-02-09
Payer: COMMERCIAL

## 2024-02-09 DIAGNOSIS — Z00.00 PREVENTATIVE HEALTH CARE: ICD-10-CM

## 2024-02-09 NOTE — TELEPHONE ENCOUNTER
Pt called and inquired information about her Hep B vaccine. 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   I informed pt that I did not show on the [facility]'s portal of her Hep B vaccine record nor anywhere on her chart however the health dept. may have it on archives.  Pt asked if she ought to get a titer or re start the series.  Dr. Luong, please advise...

## 2024-02-09 NOTE — OP THERAPY DAILY TREATMENT
Outpatient Physical Therapy  DAILY TREATMENT     [facility] Outpatient Physical Therapy  [address]  [place] [address]  Phone:  [phone]  Fax:  [phone]    Date: 02/08/2024    Patient: Christa Juarez MD  YOB: 1965  MRN: [id number]     Time Calculation    Start time: 1626  Stop time: 1710 Time Calculation (min): 44 minutes         Chief Complaint: Neck Pain    Visit #: 4    SUBJECTIVE:  Pt reports her arm pain has reduced but is noticing some increased neck pain.            Therapeutic Exercises (CPT 24967):       Therapeutic Exercise Summary: Pt was educated today regarding use of symptom modification techniques until neck pain reduces to where therex is tolerable.    Therapeutic Treatments and Modalities:     1. Manual Therapy (CPT 44391), Cervical, Soft tissue mob paraspinals, PROM of cervical flexion, lateral flexion and rotation    2. Mechanical Traction (CPT 37221), cervical spine    Time-based treatments/modalities:    Physical Therapy Timed Treatment Charges  Manual therapy minutes (CPT 99906): 20 minutes      Pain rating (1-10) before treatment:  3    ASSESSMENT:   Response to treatment: Pt had good tolerance for manual and traction techniques. Pt will continue to benefit from skilled PT to address aforementioned deficits.     PLAN/RECOMMENDATIONS:   Plan for treatment: therapy treatment to continue next visit.  Planned interventions for next visit: continue with current treatment, manual therapy (CPT 81018), mechanical traction (CPT 50133), therapeutic activities (CPT 86543), and therapeutic exercise (CPT 12812).

## 2024-02-13 ENCOUNTER — HOSPITAL ENCOUNTER (OUTPATIENT)
Dept: LAB | Facility: MEDICAL CENTER | Age: 59
End: 2024-02-13
Attending: FAMILY MEDICINE
Payer: COMMERCIAL

## 2024-02-13 DIAGNOSIS — Z00.00 PREVENTATIVE HEALTH CARE: ICD-10-CM

## 2024-02-13 LAB — HBV SURFACE AB SERPL IA-ACNC: 38 MIU/ML (ref 0–10)

## 2024-02-13 PROCEDURE — 86706 HEP B SURFACE ANTIBODY: CPT

## 2024-02-13 PROCEDURE — 36415 COLL VENOUS BLD VENIPUNCTURE: CPT

## 2024-02-15 ENCOUNTER — PHYSICAL THERAPY (OUTPATIENT)
Dept: PHYSICAL THERAPY | Facility: MEDICAL CENTER | Age: 59
End: 2024-02-15
Attending: FAMILY MEDICINE
Payer: COMMERCIAL

## 2024-02-15 DIAGNOSIS — M54.12 CERVICAL RADICULOPATHY: ICD-10-CM

## 2024-02-15 PROCEDURE — 97140 MANUAL THERAPY 1/> REGIONS: CPT

## 2024-02-15 NOTE — OP THERAPY DAILY TREATMENT
"  Outpatient Physical Therapy  DAILY TREATMENT     [facility] Outpatient Physical Therapy  [address]  [place] [address]  Phone:  [phone]  Fax:  [phone]    Date: 02/15/2024    Patient: Christa Juarez MD  YOB: 1965  MRN: [id number]     Time Calculation    Start time: 0736  Stop time: 0812 Time Calculation (min): 36 minutes         Chief Complaint: Neck Pain    Visit #: 5    SUBJECTIVE:  Pt reports she is feeling ok today, however notices that during exercises her pain increases slightly.           Therapeutic Exercises (CPT 62764):     1. SNAG's, Reviewed, HEP    2. Rows, BTB x15, HEP    3. Extensions, BTB x10, HEP    4. Levator scap stretch, 3x 15\" B/L, HEP      Therapeutic Exercise Summary: Pt was educated today regarding current condition, expectations for rehab potential and appropriate exercise program for home. HEP was given to patient in print out form and instructions were communicated to pt.       Therapeutic Treatments and Modalities:     1. Manual Therapy (CPT 95731), graston - cervical spine, Cervical paraspinals, UT's    Time-based treatments/modalities:    Physical Therapy Timed Treatment Charges  Manual therapy minutes (CPT 22050): 26 minutes  Therapeutic exercise minutes (CPT 29574): 10 minutes        ASSESSMENT:   Response to treatment: Pt had good tolerance for today's PT session. Progression back to increased exercised based therapy is recommended for upcoming appt's. Pt will continue to benefit from skilled PT to address aforementioned deficits.      PLAN/RECOMMENDATIONS:   Plan for treatment: therapy treatment to continue next visit.  Planned interventions for next visit: continue with current treatment, manual therapy (CPT 19406), neuromuscular re-education (CPT 29883), therapeutic activities (CPT 29199), and therapeutic exercise (CPT 97090).         "

## 2024-02-22 ENCOUNTER — PHYSICAL THERAPY (OUTPATIENT)
Dept: PHYSICAL THERAPY | Facility: MEDICAL CENTER | Age: 59
End: 2024-02-22
Attending: PHYSICAL MEDICINE & REHABILITATION
Payer: COMMERCIAL

## 2024-02-22 DIAGNOSIS — M54.12 CERVICAL RADICULOPATHY: ICD-10-CM

## 2024-02-22 PROCEDURE — 97140 MANUAL THERAPY 1/> REGIONS: CPT

## 2024-02-22 PROCEDURE — 97112 NEUROMUSCULAR REEDUCATION: CPT

## 2024-02-22 NOTE — OP THERAPY DAILY TREATMENT
Outpatient Physical Therapy  DAILY TREATMENT     [facility] Outpatient Physical Therapy  [address]  [place] [address]  Phone:  [phone]  Fax:  [phone]    Date: 02/22/2024    Patient: Christa Juarez MD  YOB: 1965  MRN: [id number]     Time Calculation    Start time: 0900  Stop time: 0945 Time Calculation (min): 45 minutes         Chief Complaint: Neck Pain    Visit #: 6    SUBJECTIVE:  Pt reports overall improvement of neck pain, however reports some slight aggravation of L side of neck.     OBJECTIVE:  Current objective measures:   JPE testing: L 0/3, R 1/3 (moderately closer to target with each trial on R vs. L)          Therapeutic Treatments and Modalities:     1. Manual Therapy (CPT 01059), Cervical spine, STM, PROM of sidebending and rotation (grade 2-3) glides with lateral flexion    2. Neuromuscular Re-education (CPT 96804), cervical spine, Laser joint position testing and drills    Therapeutic Treatment and Modalities Summary: X drill for laser on door (3x), CW/CCW door frame 4x.     Pt was educated regarding laser testing for JPE and connection to pain and joint position with regard to improving neuromuscular control and proprioceptive response at cervical spine.     Time-based treatments/modalities:    Physical Therapy Timed Treatment Charges  Manual therapy minutes (CPT 80606): 12 minutes  Neuromusc re-ed, balance, coor, post minutes (CPT 07771): 30 minutes      Pain rating (1-10) before treatment:  2    ASSESSMENT:   Response to treatment: Pt had good tolerance for today's PT session. Pt was tolerant of all joint position neuromuscular re-training with laser unit. Pt will continue to benefit from skilled PT to address aforementioned deficits.      PLAN/RECOMMENDATIONS:   Plan for treatment: therapy treatment to continue next visit.  Planned interventions for next visit: continue with current treatment, manual therapy (CPT 29860),  neuromuscular re-education (CPT 49436), therapeutic activities (CPT 79284), and therapeutic exercise (CPT 43940).

## 2024-02-29 ENCOUNTER — APPOINTMENT (RX ONLY)
Dept: URBAN - METROPOLITAN AREA CLINIC 6 | Facility: CLINIC | Age: 59
Setting detail: DERMATOLOGY
End: 2024-02-29

## 2024-02-29 DIAGNOSIS — Z87.2 PERSONAL HISTORY OF DISEASES OF THE SKIN AND SUBCUTANEOUS TISSUE: ICD-10-CM

## 2024-02-29 DIAGNOSIS — D22 MELANOCYTIC NEVI: ICD-10-CM

## 2024-02-29 DIAGNOSIS — L82.1 OTHER SEBORRHEIC KERATOSIS: ICD-10-CM

## 2024-02-29 DIAGNOSIS — L81.4 OTHER MELANIN HYPERPIGMENTATION: ICD-10-CM

## 2024-02-29 DIAGNOSIS — L57.0 ACTINIC KERATOSIS: ICD-10-CM

## 2024-02-29 DIAGNOSIS — D18.0 HEMANGIOMA: ICD-10-CM

## 2024-02-29 DIAGNOSIS — Z85.828 PERSONAL HISTORY OF OTHER MALIGNANT NEOPLASM OF SKIN: ICD-10-CM

## 2024-02-29 DIAGNOSIS — Z71.89 OTHER SPECIFIED COUNSELING: ICD-10-CM

## 2024-02-29 DIAGNOSIS — D485 NEOPLASM OF UNCERTAIN BEHAVIOR OF SKIN: ICD-10-CM

## 2024-02-29 DIAGNOSIS — L70.0 ACNE VULGARIS: ICD-10-CM

## 2024-02-29 PROBLEM — D18.01 HEMANGIOMA OF SKIN AND SUBCUTANEOUS TISSUE: Status: ACTIVE | Noted: 2024-02-29

## 2024-02-29 PROBLEM — D22.5 MELANOCYTIC NEVI OF TRUNK: Status: ACTIVE | Noted: 2024-02-29

## 2024-02-29 PROBLEM — D48.5 NEOPLASM OF UNCERTAIN BEHAVIOR OF SKIN: Status: ACTIVE | Noted: 2024-02-29

## 2024-02-29 PROCEDURE — ? SUNSCREEN TREATMENT REGIMEN

## 2024-02-29 PROCEDURE — ? LIQUID NITROGEN

## 2024-02-29 PROCEDURE — 17003 DESTRUCT PREMALG LES 2-14: CPT

## 2024-02-29 PROCEDURE — 11102 TANGNTL BX SKIN SINGLE LES: CPT

## 2024-02-29 PROCEDURE — 11103 TANGNTL BX SKIN EA SEP/ADDL: CPT

## 2024-02-29 PROCEDURE — ? BIOPSY BY SHAVE METHOD

## 2024-02-29 PROCEDURE — 99213 OFFICE O/P EST LOW 20 MIN: CPT | Mod: 25

## 2024-02-29 PROCEDURE — ? COUNSELING

## 2024-02-29 PROCEDURE — 17000 DESTRUCT PREMALG LESION: CPT | Mod: 59

## 2024-02-29 PROCEDURE — ? PRESCRIPTION

## 2024-02-29 PROCEDURE — ? LIQUID NITROGEN (COSMETIC)

## 2024-02-29 PROCEDURE — ? MEDICATION COUNSELING

## 2024-02-29 RX ORDER — HYDROQUINONE 4 %
CREAM (GRAM) TOPICAL QD
Qty: 1 | Refills: 2 | Status: ERX | COMMUNITY
Start: 2024-02-29

## 2024-02-29 RX ORDER — TRETIONIN 0.5 MG/G
CREAM TOPICAL QPM
Qty: 45 | Refills: 2 | Status: ERX | COMMUNITY
Start: 2024-02-29

## 2024-02-29 RX ADMIN — TRETIONIN: 0.5 CREAM TOPICAL at 00:00

## 2024-02-29 RX ADMIN — Medication: at 00:00

## 2024-02-29 ASSESSMENT — LOCATION DETAILED DESCRIPTION DERM
LOCATION DETAILED: LEFT LATERAL DORSAL FOOT
LOCATION DETAILED: LEFT SUPERIOR MEDIAL LOWER BACK
LOCATION DETAILED: LEFT SUPERIOR MEDIAL FOREHEAD
LOCATION DETAILED: LEFT LATERAL SUPERIOR CHEST
LOCATION DETAILED: LEFT MID PREAURICULAR CHEEK
LOCATION DETAILED: LEFT RADIAL DORSAL HAND
LOCATION DETAILED: RIGHT RADIAL DORSAL HAND
LOCATION DETAILED: RIGHT CENTRAL MALAR CHEEK
LOCATION DETAILED: LEFT SUPERIOR FOREHEAD
LOCATION DETAILED: RIGHT LATERAL SUPERIOR CHEST
LOCATION DETAILED: EPIGASTRIC SKIN
LOCATION DETAILED: MIDDLE STERNUM
LOCATION DETAILED: RIGHT SUPERIOR FOREHEAD
LOCATION DETAILED: LEFT INFERIOR FOREHEAD
LOCATION DETAILED: RIGHT LATERAL PROXIMAL UPPER ARM
LOCATION DETAILED: LEFT SUPERIOR MEDIAL UPPER BACK
LOCATION DETAILED: LEFT INFERIOR LATERAL FOREHEAD
LOCATION DETAILED: LEFT ANTERIOR DISTAL THIGH
LOCATION DETAILED: PERIUMBILICAL SKIN

## 2024-02-29 ASSESSMENT — LOCATION SIMPLE DESCRIPTION DERM
LOCATION SIMPLE: ABDOMEN
LOCATION SIMPLE: RIGHT UPPER ARM
LOCATION SIMPLE: LEFT FOREHEAD
LOCATION SIMPLE: CHEST
LOCATION SIMPLE: RIGHT FOREHEAD
LOCATION SIMPLE: LEFT CHEEK
LOCATION SIMPLE: LEFT THIGH
LOCATION SIMPLE: LEFT FOOT
LOCATION SIMPLE: LEFT UPPER BACK
LOCATION SIMPLE: RIGHT CHEEK
LOCATION SIMPLE: LEFT HAND
LOCATION SIMPLE: LEFT LOWER BACK
LOCATION SIMPLE: RIGHT HAND

## 2024-02-29 ASSESSMENT — LOCATION ZONE DERM
LOCATION ZONE: ARM
LOCATION ZONE: HAND
LOCATION ZONE: FACE
LOCATION ZONE: FEET
LOCATION ZONE: LEG
LOCATION ZONE: TRUNK

## 2024-02-29 NOTE — PROCEDURE: BIOPSY BY SHAVE METHOD
Detail Level: Detailed
Depth Of Biopsy: dermis
Was A Bandage Applied: Yes
Size Of Lesion In Cm: 0
Biopsy Type: H and E
Biopsy Method: Dermablade
Anesthesia Type: 1% lidocaine with epinephrine and a 1:10 solution of 8.4% sodium bicarbonate
Anesthesia Volume In Cc: 0.5
Hemostasis: Drysol
Wound Care: Vaseline
Dressing: bandage
Destruction After The Procedure: No
Type Of Destruction Used: Curettage
Curettage Text: The wound bed was treated with curettage after the biopsy was performed.
Cryotherapy Text: The wound bed was treated with cryotherapy after the biopsy was performed.
Electrodesiccation Text: The wound bed was treated with electrodesiccation after the biopsy was performed.
Electrodesiccation And Curettage Text: The wound bed was treated with electrodesiccation and curettage after the biopsy was performed.
Silver Nitrate Text: The wound bed was treated with silver nitrate after the biopsy was performed.
Lab: [id number]
Lab Facility: 
Consent: Verbal consent was obtained and risks were reviewed including but not limited to scarring, infection, bleeding, scabbing, incomplete removal, nerve damage and allergy to anesthesia.
Post-Care Instructions: I reviewed with the patient in detail post-care instructions. Patient is to keep the biopsy site dry overnight, and then apply bacitracin twice daily until healed. Patient may apply hydrogen peroxide soaks to remove any crusting.
Notification Instructions: Patient will be notified of biopsy results. However, patient instructed to call the office if not contacted within 2 weeks.
Billing Type: Third-Party Bill
Information: Selecting Yes will display possible errors in your note based on the variables you have selected. This validation is only offered as a suggestion for you. PLEASE NOTE THAT THE VALIDATION TEXT WILL BE REMOVED WHEN YOU FINALIZE YOUR NOTE. IF YOU WANT TO FAX A PRELIMINARY NOTE YOU WILL NEED TO TOGGLE THIS TO 'NO' IF YOU DO NOT WANT IT IN YOUR FAXED NOTE.

## 2024-02-29 NOTE — PROCEDURE: LIQUID NITROGEN
Consent: The patient's verbal consent was obtained including but not limited to risks of crusting, scabbing, blistering, scarring, darker or lighter pigmentary change, recurrence, incomplete removal and infection.
Show Aperture Variable?: Yes
Number Of Freeze-Thaw Cycles: 2 freeze-thaw cycles
Render Post-Care Instructions In Note?: no
Post-Care Instructions: I reviewed with the patient in detail post-care instructions. Patient is to wear sunprotection, and avoid picking at any of the treated lesions. Pt may apply Vaseline to crusted or scabbing areas.
Detail Level: Detailed
Duration Of Freeze Thaw-Cycle (Seconds): 10

## 2024-02-29 NOTE — PROCEDURE: MEDICATION COUNSELING
Topical Steroids Applications Pregnancy And Lactation Text: Most topical steroids are considered safe to use during pregnancy and lactation.  Any topical steroid applied to the breast or nipple should be washed off before breastfeeding.
Cimetidine Counseling:  I discussed with the patient the risks of Cimetidine including but not limited to gynecomastia, headache, diarrhea, nausea, drowsiness, arrhythmias, pancreatitis, skin rashes, psychosis, bone marrow suppression and kidney toxicity.
Olanzapine Pregnancy And Lactation Text: This medication is pregnancy category C.   There are no adequate and well controlled trials with olanzapine in pregnant females.  Olanzapine should be used during pregnancy only if the potential benefit justifies the potential risk to the fetus.   In a study in lactating healthy women, olanzapine was excreted in breast milk.  It is recommended that women taking olanzapine should not breast feed.
Propranolol Counseling:  I discussed with the patient the risks of propranolol including but not limited to low heart rate, low blood pressure, low blood sugar, restlessness and increased cold sensitivity. They should call the office if they experience any of these side effects.
Rituxan Pregnancy And Lactation Text: This medication is Pregnancy Category C and it isn't know if it is safe during pregnancy. It is unknown if this medication is excreted in breast milk but similar antibodies are known to be excreted.
Klisyri Pregnancy And Lactation Text: It is unknown if this medication can harm a developing fetus or if it is excreted in breast milk.
Valtrex Pregnancy And Lactation Text: this medication is Pregnancy Category B and is considered safe during pregnancy. This medication is not directly found in breast milk but it's metabolite acyclovir is present.
Prednisone Pregnancy And Lactation Text: This medication is Pregnancy Category C and it isn't know if it is safe during pregnancy. This medication is excreted in breast milk.
Doxepin Counseling:  Patient advised that the medication is sedating and not to drive a car after taking this medication. Patient informed of potential adverse effects including but not limited to dry mouth, urinary retention, and blurry vision.  The patient verbalized understanding of the proper use and possible adverse effects of doxepin.  All of the patient's questions and concerns were addressed.
Taltz Counseling: I discussed with the patient the risks of ixekizumab including but not limited to immunosuppression, serious infections, worsening of inflammatory bowel disease and drug reactions.  The patient understands that monitoring is required including a PPD at baseline and must alert us or the primary physician if symptoms of infection or other concerning signs are noted.
Benzoyl Peroxide Pregnancy And Lactation Text: This medication is Pregnancy Category C. It is unknown if benzoyl peroxide is excreted in breast milk.
Clofazimine Pregnancy And Lactation Text: This medication is Pregnancy Category C and isn't considered safe during pregnancy. It is excreted in breast milk.
Qbrexza Pregnancy And Lactation Text: There is no available data on Qbrexza use in pregnant women.  There is no available data on Qbrexza use in lactation.
Sotyktu Counseling:  I discussed the most common side effects of Sotyktu including: common cold, sore throat, sinus infections, cold sores, canker sores, folliculitis, and acne.  I also discussed more serious side effects of Sotyktu including but not limited to: serious allergic reactions; increased risk for infections such as TB; cancers such as lymphomas; rhabdomyolysis and elevated CPK; and elevated triglycerides and liver enzymes. 
Tazorac Counseling:  Patient advised that medication is irritating and drying.  Patient may need to apply sparingly and wash off after an hour before eventually leaving it on overnight.  The patient verbalized understanding of the proper use and possible adverse effects of tazorac.  All of the patient's questions and concerns were addressed.
Hydroxychloroquine Counseling:  I discussed with the patient that a baseline ophthalmologic exam is needed at the start of therapy and every year thereafter while on therapy. A CBC may also be warranted for monitoring.  The side effects of this medication were discussed with the patient, including but not limited to agranulocytosis, aplastic anemia, seizures, rashes, retinopathy, and liver toxicity. Patient instructed to call the office should any adverse effect occur.  The patient verbalized understanding of the proper use and possible adverse effects of Plaquenil.  All the patient's questions and concerns were addressed.
Libtayo Pregnancy And Lactation Text: This medication is contraindicated in pregnancy and when breast feeding.
Elidel Counseling: Patient may experience a mild burning sensation during topical application. Elidel is not approved in children less than 2 years of age. There have been case reports of hematologic and skin malignancies in patients using topical calcineurin inhibitors although causality is questionable.
Doxycycline Counseling:  Patient counseled regarding possible photosensitivity and increased risk for sunburn.  Patient instructed to avoid sunlight, if possible.  When exposed to sunlight, patients should wear protective clothing, sunglasses, and sunscreen.  The patient was instructed to call the office immediately if the following severe adverse effects occur:  hearing changes, easy bruising/bleeding, severe headache, or vision changes.  The patient verbalized understanding of the proper use and possible adverse effects of doxycycline.  All of the patient's questions and concerns were addressed.
Adbry Pregnancy And Lactation Text: It is unknown if this medication will adversely affect pregnancy or breast feeding.
Isotretinoin Pregnancy And Lactation Text: This medication is Pregnancy Category X and is considered extremely dangerous during pregnancy. It is unknown if it is excreted in breast milk.
Enbrel Counseling:  I discussed with the patient the risks of etanercept including but not limited to myelosuppression, immunosuppression, autoimmune hepatitis, demyelinating diseases, lymphoma, and infections.  The patient understands that monitoring is required including a PPD at baseline and must alert us or the primary physician if symptoms of infection or other concerning signs are noted.
Birth Control Pills Counseling: Birth Control Pill Counseling: I discussed with the patient the potential side effects of OCPs including but not limited to increased risk of stroke, heart attack, thrombophlebitis, deep venous thrombosis, hepatic adenomas, breast changes, GI upset, headaches, and depression.  The patient verbalized understanding of the proper use and possible adverse effects of OCPs. All of the patient's questions and concerns were addressed.
Quinolones Pregnancy And Lactation Text: This medication is Pregnancy Category C and it isn't know if it is safe during pregnancy. It is also excreted in breast milk.
Taltz Pregnancy And Lactation Text: The risk during pregnancy and breastfeeding is uncertain with this medication.
Doxepin Pregnancy And Lactation Text: This medication is Pregnancy Category C and it isn't known if it is safe during pregnancy. It is also excreted in breast milk and breast feeding isn't recommended.
Minoxidil Counseling: Minoxidil is a topical medication which can increase blood flow where it is applied. It is uncertain how this medication increases hair growth. Side effects are uncommon and include stinging and allergic reactions.
Oral Minoxidil Counseling- I discussed with the patient the risks of oral minoxidil including but not limited to shortness of breath, swelling of the feet or ankles, dizziness, lightheadedness, unwanted hair growth and allergic reaction.  The patient verbalized understanding of the proper use and possible adverse effects of oral minoxidil.  All of the patient's questions and concerns were addressed.
Use Enhanced Medication Counseling?: No
Topical Sulfur Applications Counseling: Topical Sulfur Counseling: Patient counseled that this medication may cause skin irritation or allergic reactions.  In the event of skin irritation, the patient was advised to reduce the amount of the drug applied or use it less frequently.   The patient verbalized understanding of the proper use and possible adverse effects of topical sulfur application.  All of the patient's questions and concerns were addressed.
Griseofulvin Counseling:  I discussed with the patient the risks of griseofulvin including but not limited to photosensitivity, cytopenia, liver damage, nausea/vomiting and severe allergy.  The patient understands that this medication is best absorbed when taken with a fatty meal (e.g., ice cream or french fries).
Zoryve Pregnancy And Lactation Text: It is unknown if this medication can cause problems during pregnancy and breastfeeding.
Odomzo Counseling- I discussed with the patient the risks of Odomzo including but not limited to nausea, vomiting, diarrhea, constipation, weight loss, changes in the sense of taste, decreased appetite, muscle spasms, and hair loss.  The patient verbalized understanding of the proper use and possible adverse effects of Odomzo.  All of the patient's questions and concerns were addressed.
Tazorac Pregnancy And Lactation Text: This medication is not safe during pregnancy. It is unknown if this medication is excreted in breast milk.
Hydroxychloroquine Pregnancy And Lactation Text: This medication has been shown to cause fetal harm but it isn't assigned a Pregnancy Risk Category. There are small amounts excreted in breast milk.
Sotyktu Pregnancy And Lactation Text: There is insufficient data to evaluate whether or not Sotyktu is safe to use during pregnancy.   It is not known if Sotyktu passes into breast milk and whether or not it is safe to use when breastfeeding.  
Propranolol Pregnancy And Lactation Text: This medication is Pregnancy Category C and it isn't known if it is safe during pregnancy. It is excreted in breast milk.
Elidel Pregnancy And Lactation Text: This medication is Pregnancy Category C. It is unknown if this medication is excreted in breast milk.
Siliq Counseling:  I discussed with the patient the risks of Siliq including but not limited to new or worsening depression, suicidal thoughts and behavior, immunosuppression, malignancy, posterior leukoencephalopathy syndrome, and serious infections.  The patient understands that monitoring is required including a PPD at baseline and must alert us or the primary physician if symptoms of infection or other concerning signs are noted. There is also a special program designed to monitor depression which is required with Siliq.
Birth Control Pills Pregnancy And Lactation Text: This medication should be avoided if pregnant and for the first 30 days post-partum.
Azithromycin Counseling:  I discussed with the patient the risks of azithromycin including but not limited to GI upset, allergic reaction, drug rash, diarrhea, and yeast infections.
Enbrel Pregnancy And Lactation Text: This medication is Pregnancy Category B and is considered safe during pregnancy. It is unknown if this medication is excreted in breast milk.
Colchicine Counseling:  Patient counseled regarding adverse effects including but not limited to stomach upset (nausea, vomiting, stomach pain, or diarrhea).  Patient instructed to limit alcohol consumption while taking this medication.  Colchicine may reduce blood counts especially with prolonged use.  The patient understands that monitoring of kidney function and blood counts may be required, especially at baseline. The patient verbalized understanding of the proper use and possible adverse effects of colchicine.  All of the patient's questions and concerns were addressed.
High Dose Vitamin A Counseling: Side effects reviewed, pt to contact office should one occur.
Rhofade Counseling: Rhofade is a topical medication which can decrease superficial blood flow where applied. Side effects are uncommon and include stinging, redness and allergic reactions.
Rifampin Counseling: I discussed with the patient the risks of rifampin including but not limited to liver damage, kidney damage, red-orange body fluids, nausea/vomiting and severe allergy.
Cellcept Pregnancy And Lactation Text: This medication is Pregnancy Category D and isn't considered safe during pregnancy. It is unknown if this medication is excreted in breast milk.
Carac Counseling:  I discussed with the patient the risks of Carac including but not limited to erythema, scaling, itching, weeping, crusting, and pain.
Zyclara Counseling:  I discussed with the patient the risks of imiquimod including but not limited to erythema, scaling, itching, weeping, crusting, and pain.  Patient understands that the inflammatory response to imiquimod is variable from person to person and was educated regarded proper titration schedule.  If flu-like symptoms develop, patient knows to discontinue the medication and contact us.
Hydroxyzine Counseling: Patient advised that the medication is sedating and not to drive a car after taking this medication.  Patient informed of potential adverse effects including but not limited to dry mouth, urinary retention, and blurry vision.  The patient verbalized understanding of the proper use and possible adverse effects of hydroxyzine.  All of the patient's questions and concerns were addressed.
Griseofulvin Pregnancy And Lactation Text: This medication is Pregnancy Category X and is known to cause serious birth defects. It is unknown if this medication is excreted in breast milk but breast feeding should be avoided.
Doxycycline Pregnancy And Lactation Text: This medication is Pregnancy Category D and not consider safe during pregnancy. It is also excreted in breast milk but is considered safe for shorter treatment courses.
Bimzelx Counseling:  I discussed with the patient the risks of Bimzelx including but not limited to depression, immunosuppression, allergic reactions and infections.  The patient understands that monitoring is required including a PPD at baseline and must alert us or the primary physician if symptoms of infection or other concerning signs are noted.
Cibinqo Counseling: I discussed with the patient the risks of Cibinqo therapy including but not limited to common cold, nausea, headache, cold sores, increased blood CPK levels, dizziness, UTIs, fatigue, acne, and vomitting. Live vaccines should be avoided.  This medication has been linked to serious infections; higher rate of mortality; malignancy and lymphoproliferative disorders; major adverse cardiovascular events; thrombosis; thrombocytopenia and lymphopenia; lipid elevations; and retinal detachment.
Eucrisa Counseling: Patient may experience a mild burning sensation during topical application. Eucrisa is not approved in children less than 2 years of age.
Tremfya Counseling: I discussed with the patient the risks of guselkumab including but not limited to immunosuppression, serious infections, and drug reactions.  The patient understands that monitoring is required including a PPD at baseline and must alert us or the primary physician if symptoms of infection or other concerning signs are noted.
Topical Clindamycin Counseling: Patient counseled that this medication may cause skin irritation or allergic reactions.  In the event of skin irritation, the patient was advised to reduce the amount of the drug applied or use it less frequently.   The patient verbalized understanding of the proper use and possible adverse effects of clindamycin.  All of the patient's questions and concerns were addressed.
Odomzo Pregnancy And Lactation Text: This medication is Pregnancy Category X and is absolutely contraindicated during pregnancy. It is unknown if it is excreted in breast milk.
Topical Sulfur Applications Pregnancy And Lactation Text: This medication is Pregnancy Category C and has an unknown safety profile during pregnancy. It is unknown if this topical medication is excreted in breast milk.
Minoxidil Pregnancy And Lactation Text: This medication has not been assigned a Pregnancy Risk Category but animal studies failed to show danger with the topical medication. It is unknown if the medication is excreted in breast milk.
Oral Minoxidil Pregnancy And Lactation Text: This medication should only be used when clearly needed if you are pregnant, attempting to become pregnant or breast feeding.
Xeljanz Counseling: I discussed with the patient the risks of Xeljanz therapy including increased risk of infection, liver issues, headache, diarrhea, or cold symptoms. Live vaccines should be avoided. They were instructed to call if they have any problems.
Low Dose Naltrexone Counseling- I discussed with the patient the potential risks and side effects of low dose naltrexone including but not limited to: more vivid dreams, headaches, nausea, vomiting, abdominal pain, fatigue, dizziness, and anxiety.
Rifampin Pregnancy And Lactation Text: This medication is Pregnancy Category C and it isn't know if it is safe during pregnancy. It is also excreted in breast milk and should not be used if you are breast feeding.
Humira Counseling:  I discussed with the patient the risks of adalimumab including but not limited to myelosuppression, immunosuppression, autoimmune hepatitis, demyelinating diseases, lymphoma, and serious infections.  The patient understands that monitoring is required including a PPD at baseline and must alert us or the primary physician if symptoms of infection or other concerning signs are noted.
Carac Pregnancy And Lactation Text: This medication is Pregnancy Category X and contraindicated in pregnancy and in women who may become pregnant. It is unknown if this medication is excreted in breast milk.
Cyclophosphamide Counseling:  I discussed with the patient the risks of cyclophosphamide including but not limited to hair loss, hormonal abnormalities, decreased fertility, abdominal pain, diarrhea, nausea and vomiting, bone marrow suppression and infection. The patient understands that monitoring is required while taking this medication.
Rhofade Pregnancy And Lactation Text: This medication has not been assigned a Pregnancy Risk Category. It is unknown if the medication is excreted in breast milk.
SSKI Counseling:  I discussed with the patient the risks of SSKI including but not limited to thyroid abnormalities, metallic taste, GI upset, fever, headache, acne, arthralgias, paraesthesias, lymphadenopathy, easy bleeding, arrhythmias, and allergic reaction.
Bimzelx Pregnancy And Lactation Text: This medication crosses the placenta and the safety is uncertain during pregnancy. It is unknown if this medication is present in breast milk.
Cibinqo Pregnancy And Lactation Text: It is unknown if this medication will adversely affect pregnancy or breast feeding.  You should not take this medication if you are currently pregnant or planning a pregnancy or while breastfeeding.
Erythromycin Counseling:  I discussed with the patient the risks of erythromycin including but not limited to GI upset, allergic reaction, drug rash, diarrhea, increase in liver enzymes, and yeast infections.
Spironolactone Counseling: Patient advised regarding risks of diarrhea, abdominal pain, hyperkalemia, birth defects (for female patients), liver toxicity and renal toxicity. The patient may need blood work to monitor liver and kidney function and potassium levels while on therapy. The patient verbalized understanding of the proper use and possible adverse effects of spironolactone.  All of the patient's questions and concerns were addressed.
High Dose Vitamin A Pregnancy And Lactation Text: High dose vitamin A therapy is contraindicated during pregnancy and breast feeding.
Wartpeel Counseling:  I discussed with the patient the risks of Wartpeel including but not limited to erythema, scaling, itching, weeping, crusting, and pain.
Otezla Counseling: The side effects of Otezla were discussed with the patient, including but not limited to worsening or new depression, weight loss, diarrhea, nausea, upper respiratory tract infection, and headache. Patient instructed to call the office should any adverse effect occur.  The patient verbalized understanding of the proper use and possible adverse effects of Otezla.  All the patient's questions and concerns were addressed.
Itraconazole Counseling:  I discussed with the patient the risks of itraconazole including but not limited to liver damage, nausea/vomiting, neuropathy, and severe allergy.  The patient understands that this medication is best absorbed when taken with acidic beverages such as non-diet cola or ginger ale.  The patient understands that monitoring is required including baseline LFTs and repeat LFTs at intervals.  The patient understands that they are to contact us or the primary physician if concerning signs are noted.
Mirvaso Counseling: Mirvaso is a topical medication which can decrease superficial blood flow where applied. Side effects are uncommon and include stinging, redness and allergic reactions.
Hydroxyzine Pregnancy And Lactation Text: This medication is not safe during pregnancy and should not be taken. It is also excreted in breast milk and breast feeding isn't recommended.
Azithromycin Pregnancy And Lactation Text: This medication is considered safe during pregnancy and is also secreted in breast milk.
Cyclophosphamide Pregnancy And Lactation Text: This medication is Pregnancy Category D and it isn't considered safe during pregnancy. This medication is excreted in breast milk.
Sski Pregnancy And Lactation Text: This medication is Pregnancy Category D and isn't considered safe during pregnancy. It is excreted in breast milk.
Simponi Counseling:  I discussed with the patient the risks of golimumab including but not limited to myelosuppression, immunosuppression, autoimmune hepatitis, demyelinating diseases, lymphoma, and serious infections.  The patient understands that monitoring is required including a PPD at baseline and must alert us or the primary physician if symptoms of infection or other concerning signs are noted.
Low Dose Naltrexone Pregnancy And Lactation Text: Naltrexone is pregnancy category C.  There have been no adequate and well-controlled studies in pregnant women.  It should be used in pregnancy only if the potential benefit justifies the potential risk to the fetus.   Limited data indicates that naltrexone is minimally excreted into breastmilk.
Calcipotriene Counseling:  I discussed with the patient the risks of calcipotriene including but not limited to erythema, scaling, itching, and irritation.
Topical Clindamycin Pregnancy And Lactation Text: This medication is Pregnancy Category B and is considered safe during pregnancy. It is unknown if it is excreted in breast milk.
Sarecycline Counseling: Patient advised regarding possible photosensitivity and discoloration of the teeth, skin, lips, tongue and gums.  Patient instructed to avoid sunlight, if possible.  When exposed to sunlight, patients should wear protective clothing, sunglasses, and sunscreen.  The patient was instructed to call the office immediately if the following severe adverse effects occur:  hearing changes, easy bruising/bleeding, severe headache, or vision changes.  The patient verbalized understanding of the proper use and possible adverse effects of sarecycline.  All of the patient's questions and concerns were addressed.
Solaraze Counseling:  I discussed with the patient the risks of Solaraze including but not limited to erythema, scaling, itching, weeping, crusting, and pain.
Dapsone Counseling: I discussed with the patient the risks of dapsone including but not limited to hemolytic anemia, agranulocytosis, rashes, methemoglobinemia, kidney failure, peripheral neuropathy, headaches, GI upset, and liver toxicity.  Patients who start dapsone require monitoring including baseline LFTs and weekly CBCs for the first month, then every month thereafter.  The patient verbalized understanding of the proper use and possible adverse effects of dapsone.  All of the patient's questions and concerns were addressed.
Erythromycin Pregnancy And Lactation Text: This medication is Pregnancy Category B and is considered safe during pregnancy. It is also excreted in breast milk.
Cimzia Counseling:  I discussed with the patient the risks of Cimzia including but not limited to immunosuppression, allergic reactions and infections.  The patient understands that monitoring is required including a PPD at baseline and must alert us or the primary physician if symptoms of infection or other concerning signs are noted.
Litfulo Counseling: I discussed with the patient the risks of Litfulo therapy including but not limited to upper respiratory tract infections, shingles, cold sores, and nausea. Live vaccines should be avoided.  This medication has been linked to serious infections; higher rate of mortality; malignancy and lymphoproliferative disorders; major adverse cardiovascular events; thrombosis; gastrointestinal perforations; neutropenia; lymphopenia; anemia; liver enzyme elevations; and lipid elevations.
Xelgeorgiaz Pregnancy And Lactation Text: This medication is Pregnancy Category D and is not considered safe during pregnancy.  The risk during breast feeding is also uncertain.
Dutasteride Male Counseling: Dustasteride Counseling:  I discussed with the patient the risks of use of dutasteride including but not limited to decreased libido, decreased ejaculate volume, and gynecomastia. Women who can become pregnant should not handle medication.  All of the patient's questions and concerns were addressed.
Bactrim Counseling:  I discussed with the patient the risks of sulfa antibiotics including but not limited to GI upset, allergic reaction, drug rash, diarrhea, dizziness, photosensitivity, and yeast infections.  Rarely, more serious reactions can occur including but not limited to aplastic anemia, agranulocytosis, methemoglobinemia, blood dyscrasias, liver or kidney failure, lung infiltrates or desquamative/blistering drug rashes.
Spironolactone Pregnancy And Lactation Text: This medication can cause feminization of the male fetus and should be avoided during pregnancy. The active metabolite is also found in breast milk.
Otezla Pregnancy And Lactation Text: This medication is Pregnancy Category C and it isn't known if it is safe during pregnancy. It is unknown if it is excreted in breast milk.
Opioid Counseling: I discussed with the patient the potential side effects of opioids including but not limited to addiction, altered mental status, and depression. I stressed avoiding alcohol, benzodiazepines, muscle relaxants and sleep aids unless specifically okayed by a physician. The patient verbalized understanding of the proper use and possible adverse effects of opioids. All of the patient's questions and concerns were addressed. They were instructed to flush the remaining pills down the toilet if they did not need them for pain.
Thalidomide Counseling: I discussed with the patient the risks of thalidomide including but not limited to birth defects, anxiety, weakness, chest pain, dizziness, cough and severe allergy.
Calcipotriene Pregnancy And Lactation Text: The use of this medication during pregnancy or lactation is not recommended as there is insufficient data.
Xolair Counseling:  Patient informed of potential adverse effects including but not limited to fever, muscle aches, rash and allergic reactions.  The patient verbalized understanding of the proper use and possible adverse effects of Xolair.  All of the patient's questions and concerns were addressed.
Ilumya Counseling: I discussed with the patient the risks of tildrakizumab including but not limited to immunosuppression, malignancy, posterior leukoencephalopathy syndrome, and serious infections.  The patient understands that monitoring is required including a PPD at baseline and must alert us or the primary physician if symptoms of infection or other concerning signs are noted.
Cantharidin Counseling:  I discussed with the patient the risks of Cantharidin including but not limited to pain, redness, burning, itching, and blistering.
Dapsone Pregnancy And Lactation Text: This medication is Pregnancy Category C and is not considered safe during pregnancy or breast feeding.
Solaraze Pregnancy And Lactation Text: This medication is Pregnancy Category B and is considered safe. There is some data to suggest avoiding during the third trimester. It is unknown if this medication is excreted in breast milk.
Albendazole Counseling:  I discussed with the patient the risks of albendazole including but not limited to cytopenia, kidney damage, nausea/vomiting and severe allergy.  The patient understands that this medication is being used in an off-label manner.
Cyclosporine Counseling:  I discussed with the patient the risks of cyclosporine including but not limited to hypertension, gingival hyperplasia,myelosuppression, immunosuppression, liver damage, kidney damage, neurotoxicity, lymphoma, and serious infections. The patient understands that monitoring is required including baseline blood pressure, CBC, CMP, lipid panel and uric acid, and then 1-2 times monthly CMP and blood pressure.
Topical Ketoconazole Counseling: Patient counseled that this medication may cause skin irritation or allergic reactions.  In the event of skin irritation, the patient was advised to reduce the amount of the drug applied or use it less frequently.   The patient verbalized understanding of the proper use and possible adverse effects of ketoconazole.  All of the patient's questions and concerns were addressed.
Hydroquinone Counseling:  Patient advised that medication may result in skin irritation, lightening (hypopigmentation), dryness, and burning.  In the event of skin irritation, the patient was advised to reduce the amount of the drug applied or use it less frequently.  Rarely, spots that are treated with hydroquinone can become darker (pseudoochronosis).  Should this occur, patient instructed to stop medication and call the office. The patient verbalized understanding of the proper use and possible adverse effects of hydroquinone.  All of the patient's questions and concerns were addressed.
Niacinamide Counseling: I recommended taking niacin or niacinamide, also know as vitamin B3, twice daily. Recent evidence suggests that taking vitamin B3 (500 mg twice daily) can reduce the risk of actinic keratoses and non-melanoma skin cancers. Side effects of vitamin B3 include flushing and headache.
Bactrim Pregnancy And Lactation Text: This medication is Pregnancy Category D and is known to cause fetal risk.  It is also excreted in breast milk.
Aklief counseling:  Patient advised to apply a pea-sized amount only at bedtime and wait 30 minutes after washing their face before applying.  If too drying, patient may add a non-comedogenic moisturizer.  The most commonly reported side effects including irritation, redness, scaling, dryness, stinging, burning, itching, and increased risk of sunburn.  The patient verbalized understanding of the proper use and possible adverse effects of retinoids.  All of the patient's questions and concerns were addressed.
Litfulo Pregnancy And Lactation Text: Based on animal studies, Lifulo may cause embryo-fetal harm when administered to pregnant women.  The medication should not be used in pregnancy.  Breastfeeding is not recommended during treatment.
Metronidazole Counseling:  I discussed with the patient the risks of metronidazole including but not limited to seizures, nausea/vomiting, a metallic taste in the mouth, nausea/vomiting and severe allergy.
Sarecycline Pregnancy And Lactation Text: This medication is Pregnancy Category D and not consider safe during pregnancy. It is also excreted in breast milk.
Xolair Pregnancy And Lactation Text: This medication is Pregnancy Category B and is considered safe during pregnancy. This medication is excreted in breast milk.
Opzelura Counseling:  I discussed with the patient the risks of Opzelura including but not limited to nasopharngitis, bronchitis, ear infection, eosinophila, hives, diarrhea, folliculitis, tonsillitis, and rhinorrhea.  Taken orally, this medication has been linked to serious infections; higher rate of mortality; malignancy and lymphoproliferative disorders; major adverse cardiovascular events; thrombosis; thrombocytopenia, anemia, and neutropenia; and lipid elevations.
Oxybutynin Counseling:  I discussed with the patient the risks of oxybutynin including but not limited to skin rash, drowsiness, dry mouth, difficulty urinating, and blurred vision.
Winlevi Counseling:  I discussed with the patient the risks of topical clascoterone including but not limited to erythema, scaling, itching, and stinging. Patient voiced their understanding.
Cimzia Pregnancy And Lactation Text: This medication crosses the placenta but can be considered safe in certain situations. Cimzia may be excreted in breast milk.
Dutasteride Female Counseling: Dutasteride Counseling:  I discussed with the patient the risks of use of dutasteride including but not limited to decreased libido and sexual dysfunction. Explained the teratogenic nature of the medication and stressed the importance of not getting pregnant during treatment. All of the patient's questions and concerns were addressed.
Cantharidin Pregnancy And Lactation Text: This medication has not been proven safe during pregnancy. It is unknown if this medication is excreted in breast milk.
Ketoconazole Counseling:   Patient counseled regarding improving absorption with orange juice.  Adverse effects include but are not limited to breast enlargement, headache, diarrhea, nausea, upset stomach, liver function test abnormalities, taste disturbance, and stomach pain.  There is a rare possibility of liver failure that can occur when taking ketoconazole. The patient understands that monitoring of LFTs may be required, especially at baseline. The patient verbalized understanding of the proper use and possible adverse effects of ketoconazole.  All of the patient's questions and concerns were addressed.
Acitretin Counseling:  I discussed with the patient the risks of acitretin including but not limited to hair loss, dry lips/skin/eyes, liver damage, hyperlipidemia, depression/suicidal ideation, photosensitivity.  Serious rare side effects can include but are not limited to pancreatitis, pseudotumor cerebri, bony changes, clot formation/stroke/heart attack.  Patient understands that alcohol is contraindicated since it can result in liver toxicity and significantly prolong the elimination of the drug by many years.
Niacinamide Pregnancy And Lactation Text: These medications are considered safe during pregnancy.
Opioid Pregnancy And Lactation Text: These medications can lead to premature delivery and should be avoided during pregnancy. These medications are also present in breast milk in small amounts.
Azathioprine Counseling:  I discussed with the patient the risks of azathioprine including but not limited to myelosuppression, immunosuppression, hepatotoxicity, lymphoma, and infections.  The patient understands that monitoring is required including baseline LFTs, Creatinine, possible TPMP genotyping and weekly CBCs for the first month and then every 2 weeks thereafter.  The patient verbalized understanding of the proper use and possible adverse effects of azathioprine.  All of the patient's questions and concerns were addressed.
Skyrizi Counseling: I discussed with the patient the risks of risankizumab-rzaa including but not limited to immunosuppression, and serious infections.  The patient understands that monitoring is required including a PPD at baseline and must alert us or the primary physician if symptoms of infection or other concerning signs are noted.
Aklief Pregnancy And Lactation Text: It is unknown if this medication is safe to use during pregnancy.  It is unknown if this medication is excreted in breast milk.  Breastfeeding women should use the topical cream on the smallest area of the skin for the shortest time needed while breastfeeding.  Do not apply to nipple and areola.
Cephalexin Counseling: I counseled the patient regarding use of cephalexin as an antibiotic for prophylactic and/or therapeutic purposes. Cephalexin (commonly prescribed under brand name Keflex) is a cephalosporin antibiotic which is active against numerous classes of bacteria, including most skin bacteria. Side effects may include nausea, diarrhea, gastrointestinal upset, rash, hives, yeast infections, and in rare cases, hepatitis, kidney disease, seizures, fever, confusion, neurologic symptoms, and others. Patients with severe allergies to penicillin medications are cautioned that there is about a 10% incidence of cross-reactivity with cephalosporins. When possible, patients with penicillin allergies should use alternatives to cephalosporins for antibiotic therapy.
Gabapentin Counseling: I discussed with the patient the risks of gabapentin including but not limited to dizziness, somnolence, fatigue and ataxia.
Olumiant Counseling: I discussed with the patient the risks of Olumiant therapy including but not limited to upper respiratory tract infections, shingles, cold sores, and nausea. Live vaccines should be avoided.  This medication has been linked to serious infections; higher rate of mortality; malignancy and lymphoproliferative disorders; major adverse cardiovascular events; thrombosis; gastrointestinal perforations; neutropenia; lymphopenia; anemia; liver enzyme elevations; and lipid elevations.
Tetracycline Counseling: Patient counseled regarding possible photosensitivity and increased risk for sunburn.  Patient instructed to avoid sunlight, if possible.  When exposed to sunlight, patients should wear protective clothing, sunglasses, and sunscreen.  The patient was instructed to call the office immediately if the following severe adverse effects occur:  hearing changes, easy bruising/bleeding, severe headache, or vision changes.  The patient verbalized understanding of the proper use and possible adverse effects of tetracycline.  All of the patient's questions and concerns were addressed. Patient understands to avoid pregnancy while on therapy due to potential birth defects.
Soolantra Counseling: I discussed with the patients the risks of topial Soolantra. This is a medicine which decreases the number of mites and inflammation in the skin. You experience burning, stinging, eye irritation or allergic reactions.  Please call our office if you develop any problems from using this medication.
Albendazole Pregnancy And Lactation Text: This medication is Pregnancy Category C and it isn't known if it is safe during pregnancy. It is also excreted in breast milk.
5-Fu Counseling: 5-Fluorouracil Counseling:  I discussed with the patient the risks of 5-fluorouracil including but not limited to erythema, scaling, itching, weeping, crusting, and pain.
Ketoconazole Pregnancy And Lactation Text: This medication is Pregnancy Category C and it isn't know if it is safe during pregnancy. It is also excreted in breast milk and breast feeding isn't recommended.
Acitretin Pregnancy And Lactation Text: This medication is Pregnancy Category X and should not be given to women who are pregnant or may become pregnant in the future. This medication is excreted in breast milk.
Cosentyx Counseling:  I discussed with the patient the risks of Cosentyx including but not limited to worsening of Crohn's disease, immunosuppression, allergic reactions and infections.  The patient understands that monitoring is required including a PPD at baseline and must alert us or the primary physician if symptoms of infection or other concerning signs are noted.
Metronidazole Pregnancy And Lactation Text: This medication is Pregnancy Category B and considered safe during pregnancy.  It is also excreted in breast milk.
Dutasteride Pregnancy And Lactation Text: This medication is absolutely contraindicated in women, especially during pregnancy and breast feeding. Feminization of male fetuses is possible if taking while pregnant.
Imiquimod Counseling:  I discussed with the patient the risks of imiquimod including but not limited to erythema, scaling, itching, weeping, crusting, and pain.  Patient understands that the inflammatory response to imiquimod is variable from person to person and was educated regarded proper titration schedule.  If flu-like symptoms develop, patient knows to discontinue the medication and contact us.
Tranexamic Acid Counseling:  Patient advised of the small risk of bleeding problems with tranexamic acid. They were also instructed to call if they developed any nausea, vomiting or diarrhea. All of the patient's questions and concerns were addressed.
Nsaids Counseling: NSAID Counseling: I discussed with the patient that NSAIDs should be taken with food. Prolonged use of NSAIDs can result in the development of stomach ulcers.  Patient advised to stop taking NSAIDs if abdominal pain occurs.  The patient verbalized understanding of the proper use and possible adverse effects of NSAIDs.  All of the patient's questions and concerns were addressed.
Topical Metronidazole Counseling: Metronidazole is a topical antibiotic medication. You may experience burning, stinging, redness, or allergic reactions.  Please call our office if you develop any problems from using this medication.
Opzelura Pregnancy And Lactation Text: There is insufficient data to evaluate drug-associated risk for major birth defects, miscarriage, or other adverse maternal or fetal outcomes.  There is a pregnancy registry that monitors pregnancy outcomes in pregnant persons exposed to the medication during pregnancy.  It is unknown if this medication is excreted in breast milk.  Do not breastfeed during treatment and for about 4 weeks after the last dose.
Winlevi Pregnancy And Lactation Text: This medication is considered safe during pregnancy and breastfeeding.
Soolantra Pregnancy And Lactation Text: This medication is Pregnancy Category C. This medication is considered safe during breast feeding.
Ivermectin Counseling:  Patient instructed to take medication on an empty stomach with a full glass of water.  Patient informed of potential adverse effects including but not limited to nausea, diarrhea, dizziness, itching, and swelling of the extremities or lymph nodes.  The patient verbalized understanding of the proper use and possible adverse effects of ivermectin.  All of the patient's questions and concerns were addressed.
Methotrexate Counseling:  Patient counseled regarding adverse effects of methotrexate including but not limited to nausea, vomiting, abnormalities in liver function tests. Patients may develop mouth sores, rash, diarrhea, and abnormalities in blood counts. The patient understands that monitoring is required including LFT's and blood counts.  There is a rare possibility of scarring of the liver and lung problems that can occur when taking methotrexate. Persistent nausea, loss of appetite, pale stools, dark urine, cough, and shortness of breath should be reported immediately. Patient advised to discontinue methotrexate treatment at least three months before attempting to become pregnant.  I discussed the need for folate supplements while taking methotrexate.  These supplements can decrease side effects during methotrexate treatment. The patient verbalized understanding of the proper use and possible adverse effects of methotrexate.  All of the patient's questions and concerns were addressed.
Finasteride Male Counseling: Finasteride Counseling:  I discussed with the patient the risks of use of finasteride including but not limited to decreased libido, decreased ejaculate volume, gynecomastia, and depression. Women should not handle medication.  All of the patient's questions and concerns were addressed.
Detail Level: Simple
Arava Counseling:  Patient counseled regarding adverse effects of Arava including but not limited to nausea, vomiting, abnormalities in liver function tests. Patients may develop mouth sores, rash, diarrhea, and abnormalities in blood counts. The patient understands that monitoring is required including LFTs and blood counts.  There is a rare possibility of scarring of the liver and lung problems that can occur when taking methotrexate. Persistent nausea, loss of appetite, pale stools, dark urine, cough, and shortness of breath should be reported immediately. Patient advised to discontinue Arava treatment and consult with a physician prior to attempting conception. The patient will have to undergo a treatment to eliminate Arava from the body prior to conception.
Bexarotene Counseling:  I discussed with the patient the risks of bexarotene including but not limited to hair loss, dry lips/skin/eyes, liver abnormalities, hyperlipidemia, pancreatitis, depression/suicidal ideation, photosensitivity, drug rash/allergic reactions, hypothyroidism, anemia, leukopenia, infection, cataracts, and teratogenicity.  Patient understands that they will need regular blood tests to check lipid profile, liver function tests, white blood cell count, thyroid function tests and pregnancy test if applicable.
Infliximab Counseling:  I discussed with the patient the risks of infliximab including but not limited to myelosuppression, immunosuppression, autoimmune hepatitis, demyelinating diseases, lymphoma, and serious infections.  The patient understands that monitoring is required including a PPD at baseline and must alert us or the primary physician if symptoms of infection or other concerning signs are noted.
Minocycline Counseling: Patient advised regarding possible photosensitivity and discoloration of the teeth, skin, lips, tongue and gums.  Patient instructed to avoid sunlight, if possible.  When exposed to sunlight, patients should wear protective clothing, sunglasses, and sunscreen.  The patient was instructed to call the office immediately if the following severe adverse effects occur:  hearing changes, easy bruising/bleeding, severe headache, or vision changes.  The patient verbalized understanding of the proper use and possible adverse effects of minocycline.  All of the patient's questions and concerns were addressed.
Olumiant Pregnancy And Lactation Text: Based on animal studies, Olumiant may cause embryo-fetal harm when administered to pregnant women.  The medication should not be used in pregnancy.  Breastfeeding is not recommended during treatment.
Erivedge Counseling- I discussed with the patient the risks of Erivedge including but not limited to nausea, vomiting, diarrhea, constipation, weight loss, changes in the sense of taste, decreased appetite, muscle spasms, and hair loss.  The patient verbalized understanding of the proper use and possible adverse effects of Erivedge.  All of the patient's questions and concerns were addressed.
Azelaic Acid Counseling: Patient counseled that medicine may cause skin irritation and to avoid applying near the eyes.  In the event of skin irritation, the patient was advised to reduce the amount of the drug applied or use it less frequently.   The patient verbalized understanding of the proper use and possible adverse effects of azelaic acid.  All of the patient's questions and concerns were addressed.
VTAMA Counseling: I discussed with the patient that VTAMA is not for use in the eyes, mouth or mouth. They should call the office if they develop any signs of allergic reactions to VTAMA. The patient verbalized understanding of the proper use and possible adverse effects of VTAMA.  All of the patient's questions and concerns were addressed.
Picato Counseling:  I discussed with the patient the risks of Picato including but not limited to erythema, scaling, itching, weeping, crusting, and pain.
Terbinafine Counseling: Patient counseling regarding adverse effects of terbinafine including but not limited to headache, diarrhea, rash, upset stomach, liver function test abnormalities, itching, taste/smell disturbance, nausea, abdominal pain, and flatulence.  There is a rare possibility of liver failure that can occur when taking terbinafine.  The patient understands that a baseline LFT and kidney function test may be required. The patient verbalized understanding of the proper use and possible adverse effects of terbinafine.  All of the patient's questions and concerns were addressed.
Cephalexin Pregnancy And Lactation Text: This medication is Pregnancy Category B and considered safe during pregnancy.  It is also excreted in breast milk but can be used safely for shorter doses.
Methotrexate Pregnancy And Lactation Text: This medication is Pregnancy Category X and is known to cause fetal harm. This medication is excreted in breast milk.
Stelara Counseling:  I discussed with the patient the risks of ustekinumab including but not limited to immunosuppression, malignancy, posterior leukoencephalopathy syndrome, and serious infections.  The patient understands that monitoring is required including a PPD at baseline and must alert us or the primary physician if symptoms of infection or other concerning signs are noted.
Drysol Counseling:  I discussed with the patient the risks of drysol/aluminum chloride including but not limited to skin rash, itching, irritation, burning.
Topical Retinoid counseling:  Patient advised to apply a pea-sized amount only at bedtime and wait 30 minutes after washing their face before applying.  If too drying, patient may add a non-comedogenic moisturizer. The patient verbalized understanding of the proper use and possible adverse effects of retinoids.  All of the patient's questions and concerns were addressed.
Tranexamic Acid Pregnancy And Lactation Text: It is unknown if this medication is safe during pregnancy or breast feeding.
Topical Metronidazole Pregnancy And Lactation Text: This medication is Pregnancy Category B and considered safe during pregnancy.  It is also considered safe to use while breastfeeding.
Cellcept Counseling:  I discussed with the patient the risks of mycophenolate mofetil including but not limited to infection/immunosuppression, GI upset, hypokalemia, hypercholesterolemia, bone marrow suppression, lymphoproliferative disorders, malignancy, GI ulceration/bleed/perforation, colitis, interstitial lung disease, kidney failure, progressive multifocal leukoencephalopathy, and birth defects.  The patient understands that monitoring is required including a baseline creatinine and regular CBC testing. In addition, patient must alert us immediately if symptoms of infection or other concerning signs are noted.
Rinvoq Counseling: I discussed with the patient the risks of Rinvoq therapy including but not limited to upper respiratory tract infections, shingles, cold sores, bronchitis, nausea, cough, fever, acne, and headache. Live vaccines should be avoided.  This medication has been linked to serious infections; higher rate of mortality; malignancy and lymphoproliferative disorders; major adverse cardiovascular events; thrombosis; thrombocytopenia, anemia, and neutropenia; lipid elevations; liver enzyme elevations; and gastrointestinal perforations.
Glycopyrrolate Counseling:  I discussed with the patient the risks of glycopyrrolate including but not limited to skin rash, drowsiness, dry mouth, difficulty urinating, and blurred vision.
Dupixent Counseling: I discussed with the patient the risks of dupilumab including but not limited to eye infection and irritation, cold sores, injection site reactions, worsening of asthma, allergic reactions and increased risk of parasitic infection.  Live vaccines should be avoided while taking dupilumab. Dupilumab will also interact with certain medications such as warfarin and cyclosporine. The patient understands that monitoring is required and they must alert us or the primary physician if symptoms of infection or other concerning signs are noted.
Nsaids Pregnancy And Lactation Text: These medications are considered safe up to 30 weeks gestation. It is excreted in breast milk.
Bexarotene Pregnancy And Lactation Text: This medication is Pregnancy Category X and should not be given to women who are pregnant or may become pregnant. This medication should not be used if you are breast feeding.
Terbinafine Pregnancy And Lactation Text: This medication is Pregnancy Category B and is considered safe during pregnancy. It is also excreted in breast milk and breast feeding isn't recommended.
Finasteride Female Counseling: Finasteride Counseling:  I discussed with the patient the risks of use of finasteride including but not limited to decreased libido and sexual dysfunction. Explained the teratogenic nature of the medication and stressed the importance of not getting pregnant during treatment. All of the patient's questions and concerns were addressed.
Clindamycin Counseling: I counseled the patient regarding use of clindamycin as an antibiotic for prophylactic and/or therapeutic purposes. Clindamycin is active against numerous classes of bacteria, including skin bacteria. Side effects may include nausea, diarrhea, gastrointestinal upset, rash, hives, yeast infections, and in rare cases, colitis.
Protopic Pregnancy And Lactation Text: This medication is Pregnancy Category C. It is unknown if this medication is excreted in breast milk when applied topically.
Azelaic Acid Pregnancy And Lactation Text: This medication is considered safe during pregnancy and breast feeding.
Valtrex Counseling: I discussed with the patient the risks of valacyclovir including but not limited to kidney damage, nausea, vomiting and severe allergy.  The patient understands that if the infection seems to be worsening or is not improving, they are to call.
Fluconazole Counseling:  Patient counseled regarding adverse effects of fluconazole including but not limited to headache, diarrhea, nausea, upset stomach, liver function test abnormalities, taste disturbance, and stomach pain.  There is a rare possibility of liver failure that can occur when taking fluconazole.  The patient understands that monitoring of LFTs and kidney function test may be required, especially at baseline. The patient verbalized understanding of the proper use and possible adverse effects of fluconazole.  All of the patient's questions and concerns were addressed.
Klisyri Counseling:  I discussed with the patient the risks of Klisyri including but not limited to erythema, scaling, itching, weeping, crusting, and pain.
Rituxan Counseling:  I discussed with the patient the risks of Rituxan infusions. Side effects can include infusion reactions, severe drug rashes including mucocutaneous reactions, reactivation of latent hepatitis and other infections and rarely progressive multifocal leukoencephalopathy.  All of the patient's questions and concerns were addressed.
Glycopyrrolate Pregnancy And Lactation Text: This medication is Pregnancy Category B and is considered safe during pregnancy. It is unknown if it is excreted breast milk.
Prednisone Counseling:  I discussed with the patient the risks of prolonged use of prednisone including but not limited to weight gain, insomnia, osteoporosis, mood changes, diabetes, susceptibility to infection, glaucoma and high blood pressure.  In cases where prednisone use is prolonged, patients should be monitored with blood pressure checks, serum glucose levels and an eye exam.  Additionally, the patient may need to be placed on GI prophylaxis, PCP prophylaxis, and calcium and vitamin D supplementation and/or a bisphosphonate.  The patient verbalized understanding of the proper use and the possible adverse effects of prednisone.  All of the patient's questions and concerns were addressed.
Topical Steroids Counseling: I discussed with the patient that prolonged use of topical steroids can result in the increased appearance of superficial blood vessels (telangiectasias), lightening (hypopigmentation) and thinning of the skin (atrophy).  Patient understands to avoid using high potency steroids in skin folds, the groin or the face.  The patient verbalized understanding of the proper use and possible adverse effects of topical steroids.  All of the patient's questions and concerns were addressed.
Olanzapine Counseling- I discussed with the patient the common side effects of olanzapine including but are not limited to: lack of energy, dry mouth, increased appetite, sleepiness, tremor, constipation, dizziness, changes in behavior, or restlessness.  Explained that teenagers are more likely to experience headaches, abdominal pain, pain in the arms or legs, tiredness, and sleepiness.  Serious side effects include but are not limited: increased risk of death in elderly patients who are confused, have memory loss, or dementia-related psychosis; hyperglycemia; increased cholesterol and triglycerides; and weight gain.
Qbrexza Counseling:  I discussed with the patient the risks of Qbrexza including but not limited to headache, mydriasis, blurred vision, dry eyes, nasal dryness, dry mouth, dry throat, dry skin, urinary hesitation, and constipation.  Local skin reactions including erythema, burning, stinging, and itching can also occur.
Quinolones Counseling:  I discussed with the patient the risks of fluoroquinolones including but not limited to GI upset, allergic reaction, drug rash, diarrhea, dizziness, photosensitivity, yeast infections, liver function test abnormalities, tendonitis/tendon rupture.
Clofazimine Counseling:  I discussed with the patient the risks of clofazimine including but not limited to skin and eye pigmentation, liver damage, nausea/vomiting, gastrointestinal bleeding and allergy.
Clindamycin Pregnancy And Lactation Text: This medication can be used in pregnancy if certain situations. Clindamycin is also present in breast milk.
Libtayo Counseling- I discussed with the patient the risks of Libtayo including but not limited to nausea, vomiting, diarrhea, and bone or muscle pain.  The patient verbalized understanding of the proper use and possible adverse effects of Libtayo.  All of the patient's questions and concerns were addressed.
Benzoyl Peroxide Counseling: Patient counseled that medicine may cause skin irritation and bleach clothing.  In the event of skin irritation, the patient was advised to reduce the amount of the drug applied or use it less frequently.   The patient verbalized understanding of the proper use and possible adverse effects of benzoyl peroxide.  All of the patient's questions and concerns were addressed.
Dupixent Pregnancy And Lactation Text: This medication likely crosses the placenta but the risk for the fetus is uncertain. This medication is excreted in breast milk.
Rinvoq Pregnancy And Lactation Text: Based on animal studies, Rinvoq may cause embryo-fetal harm when administered to pregnant women.  The medication should not be used in pregnancy.  Breastfeeding is not recommended during treatment and for 6 days after the last dose.
Adbry Counseling: I discussed with the patient the risks of tralokinumab including but not limited to eye infection and irritation, cold sores, injection site reactions, worsening of asthma, allergic reactions and increased risk of parasitic infection.  Live vaccines should be avoided while taking tralokinumab. The patient understands that monitoring is required and they must alert us or the primary physician if symptoms of infection or other concerning signs are noted.
Finasteride Pregnancy And Lactation Text: This medication is absolutely contraindicated during pregnancy. It is unknown if it is excreted in breast milk.
Zoryve Counseling:  I discussed with the patient that Zoryve is not for use in the eyes, mouth or vagina. The most commonly reported side effects include diarrhea, headache, insomnia, application site pain, upper respiratory tract infections, and urinary tract infections.  All of the patient's questions and concerns were addressed.
Protopic Counseling: Patient may experience a mild burning sensation during topical application. Protopic is not approved in children less than 2 years of age. There have been case reports of hematologic and skin malignancies in patients using topical calcineurin inhibitors although causality is questionable.
Isotretinoin Counseling: Patient should get monthly blood tests, not donate blood, not drive at night if vision affected, not share medication, and not undergo elective surgery for 6 months after tx completed. Side effects reviewed, pt to contact office should one occur.

## 2024-02-29 NOTE — PROCEDURE: LIQUID NITROGEN (COSMETIC)
Post-Care Instructions: I reviewed with the patient in detail post-care instructions. Patient is to wear sunprotection, and avoid picking at any of the treated lesions. Pt may apply Vaseline to crusted or scabbing areas.
Spray Paint Text: The liquid nitrogen was applied to the skin utilizing a spray paint frosting technique.
Price (Use Numbers Only, No Special Characters Or $): 0
Show Spray Paint Technique Variable?: Yes
Spray Paint Technique: No
Detail Level: Detailed
Billing Information: Bill by Static Price
Consent: The patient's consent was obtained including but not limited to risks of crusting, scabbing, blistering, scarring, darker or lighter pigmentary change, recurrence, incomplete removal and infection. The patient understands that the procedure is cosmetic in nature and is not covered by insurance.

## 2024-02-29 NOTE — PROCEDURE: COUNSELING
Detail Level: Detailed
Detail Level: Generalized
Detail Level: Zone
Topical Retinoid counseling:  Patient advised to apply a pea-sized amount only at bedtime and wait 30 minutes after washing their face before applying.  If too drying, patient may add a non-comedogenic moisturizer. The patient verbalized understanding of the proper use and possible adverse effects of retinoids.  All of the patient's questions and concerns were addressed.
Topical Sulfur Applications Pregnancy And Lactation Text: This medication is Pregnancy Category C and has an unknown safety profile during pregnancy. It is unknown if this topical medication is excreted in breast milk.
Dapsone Pregnancy And Lactation Text: This medication is Pregnancy Category C and is not considered safe during pregnancy or breast feeding.
Topical Clindamycin Pregnancy And Lactation Text: This medication is Pregnancy Category B and is considered safe during pregnancy. It is unknown if it is excreted in breast milk.
Birth Control Pills Pregnancy And Lactation Text: This medication should be avoided if pregnant and for the first 30 days post-partum.
Isotretinoin Counseling: Patient should get monthly blood tests, not donate blood, not drive at night if vision affected, not share medication, and not undergo elective surgery for 6 months after tx completed. Side effects reviewed, pt to contact office should one occur.
Include Pregnancy/Lactation Warning?: No
Spironolactone Pregnancy And Lactation Text: This medication can cause feminization of the male fetus and should be avoided during pregnancy. The active metabolite is also found in breast milk.
Benzoyl Peroxide Counseling: Patient counseled that medicine may cause skin irritation and bleach clothing.  In the event of skin irritation, the patient was advised to reduce the amount of the drug applied or use it less frequently.   The patient verbalized understanding of the proper use and possible adverse effects of benzoyl peroxide.  All of the patient's questions and concerns were addressed.
High Dose Vitamin A Counseling: Side effects reviewed, pt to contact office should one occur.
Azithromycin Pregnancy And Lactation Text: This medication is considered safe during pregnancy and is also secreted in breast milk.
Sarecycline Pregnancy And Lactation Text: This medication is Pregnancy Category D and not consider safe during pregnancy. It is also excreted in breast milk.
Azelaic Acid Counseling: Patient counseled that medicine may cause skin irritation and to avoid applying near the eyes.  In the event of skin irritation, the patient was advised to reduce the amount of the drug applied or use it less frequently.   The patient verbalized understanding of the proper use and possible adverse effects of azelaic acid.  All of the patient's questions and concerns were addressed.
Erythromycin Counseling:  I discussed with the patient the risks of erythromycin including but not limited to GI upset, allergic reaction, drug rash, diarrhea, increase in liver enzymes, and yeast infections.
Bactrim Pregnancy And Lactation Text: This medication is Pregnancy Category D and is known to cause fetal risk.  It is also excreted in breast milk.
Tazorac Pregnancy And Lactation Text: This medication is not safe during pregnancy. It is unknown if this medication is excreted in breast milk.
Winlevi Counseling:  I discussed with the patient the risks of topical clascoterone including but not limited to erythema, scaling, itching, and stinging. Patient voiced their understanding.
Topical Retinoid Pregnancy And Lactation Text: This medication is Pregnancy Category C. It is unknown if this medication is excreted in breast milk.
Doxycycline Counseling:  Patient counseled regarding possible photosensitivity and increased risk for sunburn.  Patient instructed to avoid sunlight, if possible.  When exposed to sunlight, patients should wear protective clothing, sunglasses, and sunscreen.  The patient was instructed to call the office immediately if the following severe adverse effects occur:  hearing changes, easy bruising/bleeding, severe headache, or vision changes.  The patient verbalized understanding of the proper use and possible adverse effects of doxycycline.  All of the patient's questions and concerns were addressed.
Aklief counseling:  Patient advised to apply a pea-sized amount only at bedtime and wait 30 minutes after washing their face before applying.  If too drying, patient may add a non-comedogenic moisturizer.  The most commonly reported side effects including irritation, redness, scaling, dryness, stinging, burning, itching, and increased risk of sunburn.  The patient verbalized understanding of the proper use and possible adverse effects of retinoids.  All of the patient's questions and concerns were addressed.
High Dose Vitamin A Pregnancy And Lactation Text: High dose vitamin A therapy is contraindicated during pregnancy and breast feeding.
Dapsone Counseling: I discussed with the patient the risks of dapsone including but not limited to hemolytic anemia, agranulocytosis, rashes, methemoglobinemia, kidney failure, peripheral neuropathy, headaches, GI upset, and liver toxicity.  Patients who start dapsone require monitoring including baseline LFTs and weekly CBCs for the first month, then every month thereafter.  The patient verbalized understanding of the proper use and possible adverse effects of dapsone.  All of the patient's questions and concerns were addressed.
Tetracycline Counseling: Patient counseled regarding possible photosensitivity and increased risk for sunburn.  Patient instructed to avoid sunlight, if possible.  When exposed to sunlight, patients should wear protective clothing, sunglasses, and sunscreen.  The patient was instructed to call the office immediately if the following severe adverse effects occur:  hearing changes, easy bruising/bleeding, severe headache, or vision changes.  The patient verbalized understanding of the proper use and possible adverse effects of tetracycline.  All of the patient's questions and concerns were addressed. Patient understands to avoid pregnancy while on therapy due to potential birth defects.
Benzoyl Peroxide Pregnancy And Lactation Text: This medication is Pregnancy Category C. It is unknown if benzoyl peroxide is excreted in breast milk.
Topical Clindamycin Counseling: Patient counseled that this medication may cause skin irritation or allergic reactions.  In the event of skin irritation, the patient was advised to reduce the amount of the drug applied or use it less frequently.   The patient verbalized understanding of the proper use and possible adverse effects of clindamycin.  All of the patient's questions and concerns were addressed.
Spironolactone Counseling: Patient advised regarding risks of diarrhea, abdominal pain, hyperkalemia, birth defects (for female patients), liver toxicity and renal toxicity. The patient may need blood work to monitor liver and kidney function and potassium levels while on therapy. The patient verbalized understanding of the proper use and possible adverse effects of spironolactone.  All of the patient's questions and concerns were addressed.
Azelaic Acid Pregnancy And Lactation Text: This medication is considered safe during pregnancy and breast feeding.
Isotretinoin Pregnancy And Lactation Text: This medication is Pregnancy Category X and is considered extremely dangerous during pregnancy. It is unknown if it is excreted in breast milk.
Birth Control Pills Counseling: Birth Control Pill Counseling: I discussed with the patient the potential side effects of OCPs including but not limited to increased risk of stroke, heart attack, thrombophlebitis, deep venous thrombosis, hepatic adenomas, breast changes, GI upset, headaches, and depression.  The patient verbalized understanding of the proper use and possible adverse effects of OCPs. All of the patient's questions and concerns were addressed.
Topical Sulfur Applications Counseling: Topical Sulfur Counseling: Patient counseled that this medication may cause skin irritation or allergic reactions.  In the event of skin irritation, the patient was advised to reduce the amount of the drug applied or use it less frequently.   The patient verbalized understanding of the proper use and possible adverse effects of topical sulfur application.  All of the patient's questions and concerns were addressed.
Bactrim Counseling:  I discussed with the patient the risks of sulfa antibiotics including but not limited to GI upset, allergic reaction, drug rash, diarrhea, dizziness, photosensitivity, and yeast infections.  Rarely, more serious reactions can occur including but not limited to aplastic anemia, agranulocytosis, methemoglobinemia, blood dyscrasias, liver or kidney failure, lung infiltrates or desquamative/blistering drug rashes.
Doxycycline Pregnancy And Lactation Text: This medication is Pregnancy Category D and not consider safe during pregnancy. It is also excreted in breast milk but is considered safe for shorter treatment courses.
Erythromycin Pregnancy And Lactation Text: This medication is Pregnancy Category B and is considered safe during pregnancy. It is also excreted in breast milk.
Sarecycline Counseling: Patient advised regarding possible photosensitivity and discoloration of the teeth, skin, lips, tongue and gums.  Patient instructed to avoid sunlight, if possible.  When exposed to sunlight, patients should wear protective clothing, sunglasses, and sunscreen.  The patient was instructed to call the office immediately if the following severe adverse effects occur:  hearing changes, easy bruising/bleeding, severe headache, or vision changes.  The patient verbalized understanding of the proper use and possible adverse effects of sarecycline.  All of the patient's questions and concerns were addressed.
Minocycline Counseling: Patient advised regarding possible photosensitivity and discoloration of the teeth, skin, lips, tongue and gums.  Patient instructed to avoid sunlight, if possible.  When exposed to sunlight, patients should wear protective clothing, sunglasses, and sunscreen.  The patient was instructed to call the office immediately if the following severe adverse effects occur:  hearing changes, easy bruising/bleeding, severe headache, or vision changes.  The patient verbalized understanding of the proper use and possible adverse effects of minocycline.  All of the patient's questions and concerns were addressed.
Azithromycin Counseling:  I discussed with the patient the risks of azithromycin including but not limited to GI upset, allergic reaction, drug rash, diarrhea, and yeast infections.
Tazorac Counseling:  Patient advised that medication is irritating and drying.  Patient may need to apply sparingly and wash off after an hour before eventually leaving it on overnight.  The patient verbalized understanding of the proper use and possible adverse effects of tazorac.  All of the patient's questions and concerns were addressed.
Winlevi Pregnancy And Lactation Text: This medication is considered safe during pregnancy and breastfeeding.
Aklief Pregnancy And Lactation Text: It is unknown if this medication is safe to use during pregnancy.  It is unknown if this medication is excreted in breast milk.  Breastfeeding women should use the topical cream on the smallest area of the skin for the shortest time needed while breastfeeding.  Do not apply to nipple and areola.

## 2024-03-08 ENCOUNTER — APPOINTMENT (OUTPATIENT)
Dept: PHYSICAL THERAPY | Facility: MEDICAL CENTER | Age: 59
End: 2024-03-08
Attending: PHYSICAL MEDICINE & REHABILITATION
Payer: COMMERCIAL

## 2024-03-14 ENCOUNTER — PHYSICAL THERAPY (OUTPATIENT)
Dept: PHYSICAL THERAPY | Facility: MEDICAL CENTER | Age: 59
End: 2024-03-14
Attending: PHYSICAL MEDICINE & REHABILITATION
Payer: COMMERCIAL

## 2024-03-14 DIAGNOSIS — M54.12 CERVICAL RADICULOPATHY: ICD-10-CM

## 2024-03-14 PROCEDURE — 97530 THERAPEUTIC ACTIVITIES: CPT

## 2024-03-14 PROCEDURE — 97140 MANUAL THERAPY 1/> REGIONS: CPT

## 2024-03-14 NOTE — OP THERAPY PROGRESS SUMMARY
Outpatient Physical Therapy  PROGRESS SUMMARY NOTE      [facility]  [address]  [place] [address]  Phone:  [phone]  Fax:  [phone]    Date of Visit: 03/14/2024    Patient: Christa Juarez MD  YOB: 1965  MRN: [id number]     Referring Provider: Akira Briscoe M.D.  [address]  [place] [address]   Referring Diagnosis Radiculopathy, cervical region [M54.12];Spondylosis without myelopathy or radiculopathy, cervical region [M47.812];Spinal stenosis, cervical region [M48.02];Pain in left elbow [M25.522];Other chronic pain [G89.29];Myalgia, unspecified site [M79.10]     Visit Diagnoses     ICD-10-CM   1. Cervical radiculopathy  M54.12       Rehab Potential: excellent    Progress Report Period: 1/18/24-3/14/24    Functional Assessment Used  Neck Disability Total: 6       Objective Findings and Assessment:   Patient progression towards goals: Short Term Goals:  1. Pt will achieve 7 or lower on NDI (achieved)  2. Pt will achieve AROM of cervical spine rotation WNL's (achieved)        Long Term Goals:    1. Pt will achieve 5 or lower on NDI (not achieved)  2. Pt will be able to return to all recreational and ADL's pain free or near pain free (2/10 or lower on numeric pain rating scale) (achieved)    Objective findings and assessment details: Pt has had excellent improvement in neck pain symptoms, pt reports she is managing her symptoms well. Pt's case will be put on hold for 30 days with referral open should patient have regression of symptoms. Pt currently has not further need of PT care, however will have referral left open, subsequently case will be closed after 30 days if no further flare of symptoms recur.     Goals:   Short Term Goals:   1. Pt will achieve 5 or lower on NDI  Short term goal time span:  2-4 weeks      Long Term Goals:    2. Pt will be independent with HEP and self management of  symptoms  Long term goal time span:  4-6 weeks    Plan:   Planned therapy interventions:  Neuromuscular Re-education (CPT 49589), Therapeutic Activities (CPT 07145), Therapeutic Exercise (CPT 90407) and Manual Therapy (CPT 53850)  Frequency:  1x month  Duration in weeks:  4  Plan details:  Discharge case after 30 days.      Referring provider co-signature:  I have reviewed this plan of care and my co-signature certifies the need for services.     Certification Period: 03/14/2024 to 04/18/24    Physician Signature: ________________________________ Date: ______________

## 2024-03-14 NOTE — OP THERAPY DAILY TREATMENT
Outpatient Physical Therapy  DAILY TREATMENT     [facility]  [address]  [place] [address]  Phone:  [phone]  Fax:  [phone]    Date: 03/14/2024    Patient: Christa Juarez MD  YOB: 1965  MRN: [id number]     Time Calculation    Start time: 0822  Stop time: 0858 Time Calculation (min): 36 minutes         Chief Complaint: Neck Pain    Visit #: 7    SUBJECTIVE:  Pt reports excellent improvement with neck pain, managing symptoms well with exercises and cervical traction at home.     OBJECTIVE:  Current objective measures:     Neck Disability Total: 6       Therapeutic Exercises (CPT 07253):       Therapeutic Exercise Summary:       Therapeutic Treatments and Modalities:     1. Manual Therapy (CPT 80973), cervical, STM, light manual traction, PROM side bending    2. Therapeutic Activities (CPT 44937), education    Therapeutic Treatment and Modalities Summary: Pt was educated today regarding expectations for independent exercise and self management techniques.     Time-based treatments/modalities:    Physical Therapy Timed Treatment Charges  Manual therapy minutes (CPT 08007): 12 minutes  Therapeutic activity minutes (CPT 60264): 24 minutes      Pain rating (1-10) before treatment:  0    ASSESSMENT:   Response to treatment: Pt has had dramatic improvement of symptoms.     PLAN/RECOMMENDATIONS:   Plan for treatment: therapy treatment to continue next visit.  Planned interventions for next visit: continue with current treatment, manual therapy (CPT 56390), neuromuscular re-education (CPT 92177), therapeutic activities (CPT 93552), and therapeutic exercise (CPT 59906).

## 2024-03-26 DIAGNOSIS — F41.8 SITUATIONAL ANXIETY: ICD-10-CM

## 2024-03-26 RX ORDER — LORAZEPAM 1 MG/1
1 TABLET ORAL
Qty: 30 TABLET | Refills: 0 | Status: SHIPPED | OUTPATIENT
Start: 2024-03-26 | End: 2024-04-25

## 2024-03-26 NOTE — TELEPHONE ENCOUNTER
Received request via: Pharmacy    Was the patient seen in the last year in this department? Yes    Does the patient have an active prescription (recently filled or refills available) for medication(s) requested? No      Does the patient have senior living Plus and need 100 day supply (blood pressure, diabetes and cholesterol meds only)? Patient does not have SCP

## 2024-03-27 ENCOUNTER — APPOINTMENT (OUTPATIENT)
Dept: PHYSICAL THERAPY | Facility: MEDICAL CENTER | Age: 59
End: 2024-03-27
Attending: PHYSICAL MEDICINE & REHABILITATION
Payer: COMMERCIAL

## 2024-03-27 ENCOUNTER — APPOINTMENT (RX ONLY)
Dept: URBAN - METROPOLITAN AREA CLINIC 6 | Facility: CLINIC | Age: 59
Setting detail: DERMATOLOGY
End: 2024-03-27

## 2024-03-27 DIAGNOSIS — L84 CORNS AND CALLOSITIES: ICD-10-CM

## 2024-03-27 DIAGNOSIS — L70.0 ACNE VULGARIS: ICD-10-CM

## 2024-03-27 PROBLEM — C44.519 BASAL CELL CARCINOMA OF SKIN OF OTHER PART OF TRUNK: Status: ACTIVE | Noted: 2024-03-27

## 2024-03-27 PROCEDURE — ? BENIGN DESTRUCTION COSMETIC

## 2024-03-27 PROCEDURE — ? COUNSELING

## 2024-03-27 PROCEDURE — 17262 DSTRJ MAL LES T/A/L 1.1-2.0: CPT

## 2024-03-27 PROCEDURE — ? MEDICATION COUNSELING

## 2024-03-27 PROCEDURE — ? CURETTAGE AND DESTRUCTION

## 2024-03-27 PROCEDURE — ? PRESCRIPTION

## 2024-03-27 RX ORDER — TRETIONIN 0.5 MG/G
CREAM TOPICAL QPM
Qty: 45 | Refills: 2 | Status: ERX | COMMUNITY
Start: 2024-03-27

## 2024-03-27 RX ORDER — HYDROQUINONE 4 %
CREAM (GRAM) TOPICAL QD
Qty: 1 | Refills: 4 | Status: ERX

## 2024-03-27 RX ADMIN — TRETIONIN: 0.5 CREAM TOPICAL at 00:00

## 2024-03-27 ASSESSMENT — LOCATION SIMPLE DESCRIPTION DERM
LOCATION SIMPLE: LEFT RING FINGER
LOCATION SIMPLE: RIGHT CHEEK

## 2024-03-27 ASSESSMENT — LOCATION DETAILED DESCRIPTION DERM
LOCATION DETAILED: LEFT MID RADIAL DORSAL RING FINGER
LOCATION DETAILED: RIGHT CENTRAL MALAR CHEEK

## 2024-03-27 ASSESSMENT — LOCATION ZONE DERM
LOCATION ZONE: FACE
LOCATION ZONE: FINGER

## 2024-03-27 NOTE — PROCEDURE: COUNSELING
Detail Level: Detailed
Detail Level: Zone
Topical Retinoid counseling:  Patient advised to apply a pea-sized amount only at bedtime and wait 30 minutes after washing their face before applying.  If too drying, patient may add a non-comedogenic moisturizer. The patient verbalized understanding of the proper use and possible adverse effects of retinoids.  All of the patient's questions and concerns were addressed.
Topical Sulfur Applications Pregnancy And Lactation Text: This medication is Pregnancy Category C and has an unknown safety profile during pregnancy. It is unknown if this topical medication is excreted in breast milk.
Dapsone Pregnancy And Lactation Text: This medication is Pregnancy Category C and is not considered safe during pregnancy or breast feeding.
Topical Clindamycin Pregnancy And Lactation Text: This medication is Pregnancy Category B and is considered safe during pregnancy. It is unknown if it is excreted in breast milk.
Birth Control Pills Pregnancy And Lactation Text: This medication should be avoided if pregnant and for the first 30 days post-partum.
Isotretinoin Counseling: Patient should get monthly blood tests, not donate blood, not drive at night if vision affected, not share medication, and not undergo elective surgery for 6 months after tx completed. Side effects reviewed, pt to contact office should one occur.
Include Pregnancy/Lactation Warning?: No
Spironolactone Pregnancy And Lactation Text: This medication can cause feminization of the male fetus and should be avoided during pregnancy. The active metabolite is also found in breast milk.
Benzoyl Peroxide Counseling: Patient counseled that medicine may cause skin irritation and bleach clothing.  In the event of skin irritation, the patient was advised to reduce the amount of the drug applied or use it less frequently.   The patient verbalized understanding of the proper use and possible adverse effects of benzoyl peroxide.  All of the patient's questions and concerns were addressed.
High Dose Vitamin A Counseling: Side effects reviewed, pt to contact office should one occur.
Azithromycin Pregnancy And Lactation Text: This medication is considered safe during pregnancy and is also secreted in breast milk.
Sarecycline Pregnancy And Lactation Text: This medication is Pregnancy Category D and not consider safe during pregnancy. It is also excreted in breast milk.
Azelaic Acid Counseling: Patient counseled that medicine may cause skin irritation and to avoid applying near the eyes.  In the event of skin irritation, the patient was advised to reduce the amount of the drug applied or use it less frequently.   The patient verbalized understanding of the proper use and possible adverse effects of azelaic acid.  All of the patient's questions and concerns were addressed.
Erythromycin Counseling:  I discussed with the patient the risks of erythromycin including but not limited to GI upset, allergic reaction, drug rash, diarrhea, increase in liver enzymes, and yeast infections.
Bactrim Pregnancy And Lactation Text: This medication is Pregnancy Category D and is known to cause fetal risk.  It is also excreted in breast milk.
Tazorac Pregnancy And Lactation Text: This medication is not safe during pregnancy. It is unknown if this medication is excreted in breast milk.
Winlevi Counseling:  I discussed with the patient the risks of topical clascoterone including but not limited to erythema, scaling, itching, and stinging. Patient voiced their understanding.
Topical Retinoid Pregnancy And Lactation Text: This medication is Pregnancy Category C. It is unknown if this medication is excreted in breast milk.
Doxycycline Counseling:  Patient counseled regarding possible photosensitivity and increased risk for sunburn.  Patient instructed to avoid sunlight, if possible.  When exposed to sunlight, patients should wear protective clothing, sunglasses, and sunscreen.  The patient was instructed to call the office immediately if the following severe adverse effects occur:  hearing changes, easy bruising/bleeding, severe headache, or vision changes.  The patient verbalized understanding of the proper use and possible adverse effects of doxycycline.  All of the patient's questions and concerns were addressed.
Aklief counseling:  Patient advised to apply a pea-sized amount only at bedtime and wait 30 minutes after washing their face before applying.  If too drying, patient may add a non-comedogenic moisturizer.  The most commonly reported side effects including irritation, redness, scaling, dryness, stinging, burning, itching, and increased risk of sunburn.  The patient verbalized understanding of the proper use and possible adverse effects of retinoids.  All of the patient's questions and concerns were addressed.
High Dose Vitamin A Pregnancy And Lactation Text: High dose vitamin A therapy is contraindicated during pregnancy and breast feeding.
Dapsone Counseling: I discussed with the patient the risks of dapsone including but not limited to hemolytic anemia, agranulocytosis, rashes, methemoglobinemia, kidney failure, peripheral neuropathy, headaches, GI upset, and liver toxicity.  Patients who start dapsone require monitoring including baseline LFTs and weekly CBCs for the first month, then every month thereafter.  The patient verbalized understanding of the proper use and possible adverse effects of dapsone.  All of the patient's questions and concerns were addressed.
Tetracycline Counseling: Patient counseled regarding possible photosensitivity and increased risk for sunburn.  Patient instructed to avoid sunlight, if possible.  When exposed to sunlight, patients should wear protective clothing, sunglasses, and sunscreen.  The patient was instructed to call the office immediately if the following severe adverse effects occur:  hearing changes, easy bruising/bleeding, severe headache, or vision changes.  The patient verbalized understanding of the proper use and possible adverse effects of tetracycline.  All of the patient's questions and concerns were addressed. Patient understands to avoid pregnancy while on therapy due to potential birth defects.
Benzoyl Peroxide Pregnancy And Lactation Text: This medication is Pregnancy Category C. It is unknown if benzoyl peroxide is excreted in breast milk.
Topical Clindamycin Counseling: Patient counseled that this medication may cause skin irritation or allergic reactions.  In the event of skin irritation, the patient was advised to reduce the amount of the drug applied or use it less frequently.   The patient verbalized understanding of the proper use and possible adverse effects of clindamycin.  All of the patient's questions and concerns were addressed.
Spironolactone Counseling: Patient advised regarding risks of diarrhea, abdominal pain, hyperkalemia, birth defects (for female patients), liver toxicity and renal toxicity. The patient may need blood work to monitor liver and kidney function and potassium levels while on therapy. The patient verbalized understanding of the proper use and possible adverse effects of spironolactone.  All of the patient's questions and concerns were addressed.
Azelaic Acid Pregnancy And Lactation Text: This medication is considered safe during pregnancy and breast feeding.
Isotretinoin Pregnancy And Lactation Text: This medication is Pregnancy Category X and is considered extremely dangerous during pregnancy. It is unknown if it is excreted in breast milk.
Birth Control Pills Counseling: Birth Control Pill Counseling: I discussed with the patient the potential side effects of OCPs including but not limited to increased risk of stroke, heart attack, thrombophlebitis, deep venous thrombosis, hepatic adenomas, breast changes, GI upset, headaches, and depression.  The patient verbalized understanding of the proper use and possible adverse effects of OCPs. All of the patient's questions and concerns were addressed.
Topical Sulfur Applications Counseling: Topical Sulfur Counseling: Patient counseled that this medication may cause skin irritation or allergic reactions.  In the event of skin irritation, the patient was advised to reduce the amount of the drug applied or use it less frequently.   The patient verbalized understanding of the proper use and possible adverse effects of topical sulfur application.  All of the patient's questions and concerns were addressed.
Bactrim Counseling:  I discussed with the patient the risks of sulfa antibiotics including but not limited to GI upset, allergic reaction, drug rash, diarrhea, dizziness, photosensitivity, and yeast infections.  Rarely, more serious reactions can occur including but not limited to aplastic anemia, agranulocytosis, methemoglobinemia, blood dyscrasias, liver or kidney failure, lung infiltrates or desquamative/blistering drug rashes.
Doxycycline Pregnancy And Lactation Text: This medication is Pregnancy Category D and not consider safe during pregnancy. It is also excreted in breast milk but is considered safe for shorter treatment courses.
Erythromycin Pregnancy And Lactation Text: This medication is Pregnancy Category B and is considered safe during pregnancy. It is also excreted in breast milk.
Sarecycline Counseling: Patient advised regarding possible photosensitivity and discoloration of the teeth, skin, lips, tongue and gums.  Patient instructed to avoid sunlight, if possible.  When exposed to sunlight, patients should wear protective clothing, sunglasses, and sunscreen.  The patient was instructed to call the office immediately if the following severe adverse effects occur:  hearing changes, easy bruising/bleeding, severe headache, or vision changes.  The patient verbalized understanding of the proper use and possible adverse effects of sarecycline.  All of the patient's questions and concerns were addressed.
Minocycline Counseling: Patient advised regarding possible photosensitivity and discoloration of the teeth, skin, lips, tongue and gums.  Patient instructed to avoid sunlight, if possible.  When exposed to sunlight, patients should wear protective clothing, sunglasses, and sunscreen.  The patient was instructed to call the office immediately if the following severe adverse effects occur:  hearing changes, easy bruising/bleeding, severe headache, or vision changes.  The patient verbalized understanding of the proper use and possible adverse effects of minocycline.  All of the patient's questions and concerns were addressed.
Azithromycin Counseling:  I discussed with the patient the risks of azithromycin including but not limited to GI upset, allergic reaction, drug rash, diarrhea, and yeast infections.
Tazorac Counseling:  Patient advised that medication is irritating and drying.  Patient may need to apply sparingly and wash off after an hour before eventually leaving it on overnight.  The patient verbalized understanding of the proper use and possible adverse effects of tazorac.  All of the patient's questions and concerns were addressed.
Winlevi Pregnancy And Lactation Text: This medication is considered safe during pregnancy and breastfeeding.
Aklief Pregnancy And Lactation Text: It is unknown if this medication is safe to use during pregnancy.  It is unknown if this medication is excreted in breast milk.  Breastfeeding women should use the topical cream on the smallest area of the skin for the shortest time needed while breastfeeding.  Do not apply to nipple and areola.

## 2024-03-27 NOTE — PROCEDURE: MEDICATION COUNSELING
Mirvaso Pregnancy And Lactation Text: This medication has not been assigned a Pregnancy Risk Category. It is unknown if the medication is excreted in breast milk.
Bactrim Counseling:  I discussed with the patient the risks of sulfa antibiotics including but not limited to GI upset, allergic reaction, drug rash, diarrhea, dizziness, photosensitivity, and yeast infections.  Rarely, more serious reactions can occur including but not limited to aplastic anemia, agranulocytosis, methemoglobinemia, blood dyscrasias, liver or kidney failure, lung infiltrates or desquamative/blistering drug rashes.
Dupixent Pregnancy And Lactation Text: This medication likely crosses the placenta but the risk for the fetus is uncertain. This medication is excreted in breast milk.
Calcipotriene Pregnancy And Lactation Text: The use of this medication during pregnancy or lactation is not recommended as there is insufficient data.
Libtayo Counseling- I discussed with the patient the risks of Libtayo including but not limited to nausea, vomiting, diarrhea, and bone or muscle pain.  The patient verbalized understanding of the proper use and possible adverse effects of Libtayo.  All of the patient's questions and concerns were addressed.
Topical Retinoid Pregnancy And Lactation Text: This medication is Pregnancy Category C. It is unknown if this medication is excreted in breast milk.
Thalidomide Counseling: I discussed with the patient the risks of thalidomide including but not limited to birth defects, anxiety, weakness, chest pain, dizziness, cough and severe allergy.
Adbry Counseling: I discussed with the patient the risks of tralokinumab including but not limited to eye infection and irritation, cold sores, injection site reactions, worsening of asthma, allergic reactions and increased risk of parasitic infection.  Live vaccines should be avoided while taking tralokinumab. The patient understands that monitoring is required and they must alert us or the primary physician if symptoms of infection or other concerning signs are noted.
Clofazimine Counseling:  I discussed with the patient the risks of clofazimine including but not limited to skin and eye pigmentation, liver damage, nausea/vomiting, gastrointestinal bleeding and allergy.
Cantharidin Counseling:  I discussed with the patient the risks of Cantharidin including but not limited to pain, redness, burning, itching, and blistering.
Hydroquinone Counseling:  Patient advised that medication may result in skin irritation, lightening (hypopigmentation), dryness, and burning.  In the event of skin irritation, the patient was advised to reduce the amount of the drug applied or use it less frequently.  Rarely, spots that are treated with hydroquinone can become darker (pseudoochronosis).  Should this occur, patient instructed to stop medication and call the office. The patient verbalized understanding of the proper use and possible adverse effects of hydroquinone.  All of the patient's questions and concerns were addressed.
Cyclosporine Counseling:  I discussed with the patient the risks of cyclosporine including but not limited to hypertension, gingival hyperplasia,myelosuppression, immunosuppression, liver damage, kidney damage, neurotoxicity, lymphoma, and serious infections. The patient understands that monitoring is required including baseline blood pressure, CBC, CMP, lipid panel and uric acid, and then 1-2 times monthly CMP and blood pressure.
Finasteride Pregnancy And Lactation Text: This medication is absolutely contraindicated during pregnancy. It is unknown if it is excreted in breast milk.
Glycopyrrolate Pregnancy And Lactation Text: This medication is Pregnancy Category B and is considered safe during pregnancy. It is unknown if it is excreted breast milk.
Topical Steroids Applications Pregnancy And Lactation Text: Most topical steroids are considered safe to use during pregnancy and lactation.  Any topical steroid applied to the breast or nipple should be washed off before breastfeeding.
Taltz Counseling: I discussed with the patient the risks of ixekizumab including but not limited to immunosuppression, serious infections, worsening of inflammatory bowel disease and drug reactions.  The patient understands that monitoring is required including a PPD at baseline and must alert us or the primary physician if symptoms of infection or other concerning signs are noted.
Cyclophosphamide Counseling:  I discussed with the patient the risks of cyclophosphamide including but not limited to hair loss, hormonal abnormalities, decreased fertility, abdominal pain, diarrhea, nausea and vomiting, bone marrow suppression and infection. The patient understands that monitoring is required while taking this medication.
Litfulo Pregnancy And Lactation Text: Based on animal studies, Lifulo may cause embryo-fetal harm when administered to pregnant women.  The medication should not be used in pregnancy.  Breastfeeding is not recommended during treatment.
Sarecycline Pregnancy And Lactation Text: This medication is Pregnancy Category D and not consider safe during pregnancy. It is also excreted in breast milk.
Zoryve Counseling:  I discussed with the patient that Zoryve is not for use in the eyes, mouth or vagina. The most commonly reported side effects include diarrhea, headache, insomnia, application site pain, upper respiratory tract infections, and urinary tract infections.  All of the patient's questions and concerns were addressed.
Aklief counseling:  Patient advised to apply a pea-sized amount only at bedtime and wait 30 minutes after washing their face before applying.  If too drying, patient may add a non-comedogenic moisturizer.  The most commonly reported side effects including irritation, redness, scaling, dryness, stinging, burning, itching, and increased risk of sunburn.  The patient verbalized understanding of the proper use and possible adverse effects of retinoids.  All of the patient's questions and concerns were addressed.
Metronidazole Counseling:  I discussed with the patient the risks of metronidazole including but not limited to seizures, nausea/vomiting, a metallic taste in the mouth, nausea/vomiting and severe allergy.
Ketoconazole Counseling:   Patient counseled regarding improving absorption with orange juice.  Adverse effects include but are not limited to breast enlargement, headache, diarrhea, nausea, upset stomach, liver function test abnormalities, taste disturbance, and stomach pain.  There is a rare possibility of liver failure that can occur when taking ketoconazole. The patient understands that monitoring of LFTs may be required, especially at baseline. The patient verbalized understanding of the proper use and possible adverse effects of ketoconazole.  All of the patient's questions and concerns were addressed.
Libtayo Pregnancy And Lactation Text: This medication is contraindicated in pregnancy and when breast feeding.
Olanzapine Pregnancy And Lactation Text: This medication is pregnancy category C.   There are no adequate and well controlled trials with olanzapine in pregnant females.  Olanzapine should be used during pregnancy only if the potential benefit justifies the potential risk to the fetus.   In a study in lactating healthy women, olanzapine was excreted in breast milk.  It is recommended that women taking olanzapine should not breast feed.
Tazorac Counseling:  Patient advised that medication is irritating and drying.  Patient may need to apply sparingly and wash off after an hour before eventually leaving it on overnight.  The patient verbalized understanding of the proper use and possible adverse effects of tazorac.  All of the patient's questions and concerns were addressed.
Opzelura Counseling:  I discussed with the patient the risks of Opzelura including but not limited to nasopharngitis, bronchitis, ear infection, eosinophila, hives, diarrhea, folliculitis, tonsillitis, and rhinorrhea.  Taken orally, this medication has been linked to serious infections; higher rate of mortality; malignancy and lymphoproliferative disorders; major adverse cardiovascular events; thrombosis; thrombocytopenia, anemia, and neutropenia; and lipid elevations.
Acitretin Counseling:  I discussed with the patient the risks of acitretin including but not limited to hair loss, dry lips/skin/eyes, liver damage, hyperlipidemia, depression/suicidal ideation, photosensitivity.  Serious rare side effects can include but are not limited to pancreatitis, pseudotumor cerebri, bony changes, clot formation/stroke/heart attack.  Patient understands that alcohol is contraindicated since it can result in liver toxicity and significantly prolong the elimination of the drug by many years.
Cantharidin Pregnancy And Lactation Text: This medication has not been proven safe during pregnancy. It is unknown if this medication is excreted in breast milk.
Rituxan Pregnancy And Lactation Text: This medication is Pregnancy Category C and it isn't know if it is safe during pregnancy. It is unknown if this medication is excreted in breast milk but similar antibodies are known to be excreted.
Clofazimine Pregnancy And Lactation Text: This medication is Pregnancy Category C and isn't considered safe during pregnancy. It is excreted in breast milk.
Qbrexza Pregnancy And Lactation Text: There is no available data on Qbrexza use in pregnant women.  There is no available data on Qbrexza use in lactation.
Hydroquinone Pregnancy And Lactation Text: This medication has not been assigned a Pregnancy Risk Category but animal studies failed to show danger with the topical medication. It is unknown if the medication is excreted in breast milk.
Birth Control Pills Counseling: Birth Control Pill Counseling: I discussed with the patient the potential side effects of OCPs including but not limited to increased risk of stroke, heart attack, thrombophlebitis, deep venous thrombosis, hepatic adenomas, breast changes, GI upset, headaches, and depression.  The patient verbalized understanding of the proper use and possible adverse effects of OCPs. All of the patient's questions and concerns were addressed.
Enbrel Counseling:  I discussed with the patient the risks of etanercept including but not limited to myelosuppression, immunosuppression, autoimmune hepatitis, demyelinating diseases, lymphoma, and infections.  The patient understands that monitoring is required including a PPD at baseline and must alert us or the primary physician if symptoms of infection or other concerning signs are noted.
Bactrim Pregnancy And Lactation Text: This medication is Pregnancy Category D and is known to cause fetal risk.  It is also excreted in breast milk.
Hydroxychloroquine Counseling:  I discussed with the patient that a baseline ophthalmologic exam is needed at the start of therapy and every year thereafter while on therapy. A CBC may also be warranted for monitoring.  The side effects of this medication were discussed with the patient, including but not limited to agranulocytosis, aplastic anemia, seizures, rashes, retinopathy, and liver toxicity. Patient instructed to call the office should any adverse effect occur.  The patient verbalized understanding of the proper use and possible adverse effects of Plaquenil.  All the patient's questions and concerns were addressed.
Taltz Pregnancy And Lactation Text: The risk during pregnancy and breastfeeding is uncertain with this medication.
Doxepin Pregnancy And Lactation Text: This medication is Pregnancy Category C and it isn't known if it is safe during pregnancy. It is also excreted in breast milk and breast feeding isn't recommended.
Zoryve Pregnancy And Lactation Text: It is unknown if this medication can cause problems during pregnancy and breastfeeding.
Aklief Pregnancy And Lactation Text: It is unknown if this medication is safe to use during pregnancy.  It is unknown if this medication is excreted in breast milk.  Breastfeeding women should use the topical cream on the smallest area of the skin for the shortest time needed while breastfeeding.  Do not apply to nipple and areola.
Adbry Pregnancy And Lactation Text: It is unknown if this medication will adversely affect pregnancy or breast feeding.
Cyclosporine Pregnancy And Lactation Text: This medication is Pregnancy Category C and it isn't know if it is safe during pregnancy. This medication is excreted in breast milk.
5-Fu Counseling: 5-Fluorouracil Counseling:  I discussed with the patient the risks of 5-fluorouracil including but not limited to erythema, scaling, itching, weeping, crusting, and pain.
Thalidomide Pregnancy And Lactation Text: This medication is Pregnancy Category X and is absolutely contraindicated during pregnancy. It is unknown if it is excreted in breast milk.
Odomzo Counseling- I discussed with the patient the risks of Odomzo including but not limited to nausea, vomiting, diarrhea, constipation, weight loss, changes in the sense of taste, decreased appetite, muscle spasms, and hair loss.  The patient verbalized understanding of the proper use and possible adverse effects of Odomzo.  All of the patient's questions and concerns were addressed.
Tazorac Pregnancy And Lactation Text: This medication is not safe during pregnancy. It is unknown if this medication is excreted in breast milk.
Metronidazole Pregnancy And Lactation Text: This medication is Pregnancy Category B and considered safe during pregnancy.  It is also excreted in breast milk.
Olumiant Counseling: I discussed with the patient the risks of Olumiant therapy including but not limited to upper respiratory tract infections, shingles, cold sores, and nausea. Live vaccines should be avoided.  This medication has been linked to serious infections; higher rate of mortality; malignancy and lymphoproliferative disorders; major adverse cardiovascular events; thrombosis; gastrointestinal perforations; neutropenia; lymphopenia; anemia; liver enzyme elevations; and lipid elevations.
Topical Sulfur Applications Counseling: Topical Sulfur Counseling: Patient counseled that this medication may cause skin irritation or allergic reactions.  In the event of skin irritation, the patient was advised to reduce the amount of the drug applied or use it less frequently.   The patient verbalized understanding of the proper use and possible adverse effects of topical sulfur application.  All of the patient's questions and concerns were addressed.
Tetracycline Counseling: Patient counseled regarding possible photosensitivity and increased risk for sunburn.  Patient instructed to avoid sunlight, if possible.  When exposed to sunlight, patients should wear protective clothing, sunglasses, and sunscreen.  The patient was instructed to call the office immediately if the following severe adverse effects occur:  hearing changes, easy bruising/bleeding, severe headache, or vision changes.  The patient verbalized understanding of the proper use and possible adverse effects of tetracycline.  All of the patient's questions and concerns were addressed. Patient understands to avoid pregnancy while on therapy due to potential birth defects.
Birth Control Pills Pregnancy And Lactation Text: This medication should be avoided if pregnant and for the first 30 days post-partum.
Enbrel Pregnancy And Lactation Text: This medication is Pregnancy Category B and is considered safe during pregnancy. It is unknown if this medication is excreted in breast milk.
Cephalexin Counseling: I counseled the patient regarding use of cephalexin as an antibiotic for prophylactic and/or therapeutic purposes. Cephalexin (commonly prescribed under brand name Keflex) is a cephalosporin antibiotic which is active against numerous classes of bacteria, including most skin bacteria. Side effects may include nausea, diarrhea, gastrointestinal upset, rash, hives, yeast infections, and in rare cases, hepatitis, kidney disease, seizures, fever, confusion, neurologic symptoms, and others. Patients with severe allergies to penicillin medications are cautioned that there is about a 10% incidence of cross-reactivity with cephalosporins. When possible, patients with penicillin allergies should use alternatives to cephalosporins for antibiotic therapy.
Hydroxychloroquine Pregnancy And Lactation Text: This medication has been shown to cause fetal harm but it isn't assigned a Pregnancy Risk Category. There are small amounts excreted in breast milk.
Imiquimod Counseling:  I discussed with the patient the risks of imiquimod including but not limited to erythema, scaling, itching, weeping, crusting, and pain.  Patient understands that the inflammatory response to imiquimod is variable from person to person and was educated regarded proper titration schedule.  If flu-like symptoms develop, patient knows to discontinue the medication and contact us.
Siliq Counseling:  I discussed with the patient the risks of Siliq including but not limited to new or worsening depression, suicidal thoughts and behavior, immunosuppression, malignancy, posterior leukoencephalopathy syndrome, and serious infections.  The patient understands that monitoring is required including a PPD at baseline and must alert us or the primary physician if symptoms of infection or other concerning signs are noted. There is also a special program designed to monitor depression which is required with Siliq.
Acitretin Pregnancy And Lactation Text: This medication is Pregnancy Category X and should not be given to women who are pregnant or may become pregnant in the future. This medication is excreted in breast milk.
Opzelura Pregnancy And Lactation Text: There is insufficient data to evaluate drug-associated risk for major birth defects, miscarriage, or other adverse maternal or fetal outcomes.  There is a pregnancy registry that monitors pregnancy outcomes in pregnant persons exposed to the medication during pregnancy.  It is unknown if this medication is excreted in breast milk.  Do not breastfeed during treatment and for about 4 weeks after the last dose.
Ketoconazole Pregnancy And Lactation Text: This medication is Pregnancy Category C and it isn't know if it is safe during pregnancy. It is also excreted in breast milk and breast feeding isn't recommended.
Oral Minoxidil Counseling- I discussed with the patient the risks of oral minoxidil including but not limited to shortness of breath, swelling of the feet or ankles, dizziness, lightheadedness, unwanted hair growth and allergic reaction.  The patient verbalized understanding of the proper use and possible adverse effects of oral minoxidil.  All of the patient's questions and concerns were addressed.
Zyclara Counseling:  I discussed with the patient the risks of imiquimod including but not limited to erythema, scaling, itching, weeping, crusting, and pain.  Patient understands that the inflammatory response to imiquimod is variable from person to person and was educated regarded proper titration schedule.  If flu-like symptoms develop, patient knows to discontinue the medication and contact us.
Bimzelx Counseling:  I discussed with the patient the risks of Bimzelx including but not limited to depression, immunosuppression, allergic reactions and infections.  The patient understands that monitoring is required including a PPD at baseline and must alert us or the primary physician if symptoms of infection or other concerning signs are noted.
Colchicine Counseling:  Patient counseled regarding adverse effects including but not limited to stomach upset (nausea, vomiting, stomach pain, or diarrhea).  Patient instructed to limit alcohol consumption while taking this medication.  Colchicine may reduce blood counts especially with prolonged use.  The patient understands that monitoring of kidney function and blood counts may be required, especially at baseline. The patient verbalized understanding of the proper use and possible adverse effects of colchicine.  All of the patient's questions and concerns were addressed.
Tranexamic Acid Counseling:  Patient advised of the small risk of bleeding problems with tranexamic acid. They were also instructed to call if they developed any nausea, vomiting or diarrhea. All of the patient's questions and concerns were addressed.
Methotrexate Counseling:  Patient counseled regarding adverse effects of methotrexate including but not limited to nausea, vomiting, abnormalities in liver function tests. Patients may develop mouth sores, rash, diarrhea, and abnormalities in blood counts. The patient understands that monitoring is required including LFT's and blood counts.  There is a rare possibility of scarring of the liver and lung problems that can occur when taking methotrexate. Persistent nausea, loss of appetite, pale stools, dark urine, cough, and shortness of breath should be reported immediately. Patient advised to discontinue methotrexate treatment at least three months before attempting to become pregnant.  I discussed the need for folate supplements while taking methotrexate.  These supplements can decrease side effects during methotrexate treatment. The patient verbalized understanding of the proper use and possible adverse effects of methotrexate.  All of the patient's questions and concerns were addressed.
Rhofade Counseling: Rhofade is a topical medication which can decrease superficial blood flow where applied. Side effects are uncommon and include stinging, redness and allergic reactions.
Azelaic Acid Counseling: Patient counseled that medicine may cause skin irritation and to avoid applying near the eyes.  In the event of skin irritation, the patient was advised to reduce the amount of the drug applied or use it less frequently.   The patient verbalized understanding of the proper use and possible adverse effects of azelaic acid.  All of the patient's questions and concerns were addressed.
5-Fu Pregnancy And Lactation Text: This medication is Pregnancy Category X and contraindicated in pregnancy and in women who may become pregnant. It is unknown if this medication is excreted in breast milk.
Olumiant Pregnancy And Lactation Text: Based on animal studies, Olumiant may cause embryo-fetal harm when administered to pregnant women.  The medication should not be used in pregnancy.  Breastfeeding is not recommended during treatment.
Hydroxyzine Counseling: Patient advised that the medication is sedating and not to drive a car after taking this medication.  Patient informed of potential adverse effects including but not limited to dry mouth, urinary retention, and blurry vision.  The patient verbalized understanding of the proper use and possible adverse effects of hydroxyzine.  All of the patient's questions and concerns were addressed.
Low Dose Naltrexone Counseling- I discussed with the patient the potential risks and side effects of low dose naltrexone including but not limited to: more vivid dreams, headaches, nausea, vomiting, abdominal pain, fatigue, dizziness, and anxiety.
Terbinafine Counseling: Patient counseling regarding adverse effects of terbinafine including but not limited to headache, diarrhea, rash, upset stomach, liver function test abnormalities, itching, taste/smell disturbance, nausea, abdominal pain, and flatulence.  There is a rare possibility of liver failure that can occur when taking terbinafine.  The patient understands that a baseline LFT and kidney function test may be required. The patient verbalized understanding of the proper use and possible adverse effects of terbinafine.  All of the patient's questions and concerns were addressed.
Topical Clindamycin Counseling: Patient counseled that this medication may cause skin irritation or allergic reactions.  In the event of skin irritation, the patient was advised to reduce the amount of the drug applied or use it less frequently.   The patient verbalized understanding of the proper use and possible adverse effects of clindamycin.  All of the patient's questions and concerns were addressed.
Cephalexin Pregnancy And Lactation Text: This medication is Pregnancy Category B and considered safe during pregnancy.  It is also excreted in breast milk but can be used safely for shorter doses.
Minocycline Counseling: Patient advised regarding possible photosensitivity and discoloration of the teeth, skin, lips, tongue and gums.  Patient instructed to avoid sunlight, if possible.  When exposed to sunlight, patients should wear protective clothing, sunglasses, and sunscreen.  The patient was instructed to call the office immediately if the following severe adverse effects occur:  hearing changes, easy bruising/bleeding, severe headache, or vision changes.  The patient verbalized understanding of the proper use and possible adverse effects of minocycline.  All of the patient's questions and concerns were addressed.
Topical Sulfur Applications Pregnancy And Lactation Text: This medication is Pregnancy Category C and has an unknown safety profile during pregnancy. It is unknown if this topical medication is excreted in breast milk.
Oral Minoxidil Pregnancy And Lactation Text: This medication should only be used when clearly needed if you are pregnant, attempting to become pregnant or breast feeding.
Bexarotene Counseling:  I discussed with the patient the risks of bexarotene including but not limited to hair loss, dry lips/skin/eyes, liver abnormalities, hyperlipidemia, pancreatitis, depression/suicidal ideation, photosensitivity, drug rash/allergic reactions, hypothyroidism, anemia, leukopenia, infection, cataracts, and teratogenicity.  Patient understands that they will need regular blood tests to check lipid profile, liver function tests, white blood cell count, thyroid function tests and pregnancy test if applicable.
Tremfya Counseling: I discussed with the patient the risks of guselkumab including but not limited to immunosuppression, serious infections, and drug reactions.  The patient understands that monitoring is required including a PPD at baseline and must alert us or the primary physician if symptoms of infection or other concerning signs are noted.
Bimzelx Pregnancy And Lactation Text: This medication crosses the placenta and the safety is uncertain during pregnancy. It is unknown if this medication is present in breast milk.
Methotrexate Pregnancy And Lactation Text: This medication is Pregnancy Category X and is known to cause fetal harm. This medication is excreted in breast milk.
Tranexamic Acid Pregnancy And Lactation Text: It is unknown if this medication is safe during pregnancy or breast feeding.
Picato Counseling:  I discussed with the patient the risks of Picato including but not limited to erythema, scaling, itching, weeping, crusting, and pain.
Humira Counseling:  I discussed with the patient the risks of adalimumab including but not limited to myelosuppression, immunosuppression, autoimmune hepatitis, demyelinating diseases, lymphoma, and serious infections.  The patient understands that monitoring is required including a PPD at baseline and must alert us or the primary physician if symptoms of infection or other concerning signs are noted.
Detail Level: Simple
Drysol Counseling:  I discussed with the patient the risks of drysol/aluminum chloride including but not limited to skin rash, itching, irritation, burning.
Hydroxyzine Pregnancy And Lactation Text: This medication is not safe during pregnancy and should not be taken. It is also excreted in breast milk and breast feeding isn't recommended.
Rinvoq Counseling: I discussed with the patient the risks of Rinvoq therapy including but not limited to upper respiratory tract infections, shingles, cold sores, bronchitis, nausea, cough, fever, acne, and headache. Live vaccines should be avoided.  This medication has been linked to serious infections; higher rate of mortality; malignancy and lymphoproliferative disorders; major adverse cardiovascular events; thrombosis; thrombocytopenia, anemia, and neutropenia; lipid elevations; liver enzyme elevations; and gastrointestinal perforations.
Spironolactone Counseling: Patient advised regarding risks of diarrhea, abdominal pain, hyperkalemia, birth defects (for female patients), liver toxicity and renal toxicity. The patient may need blood work to monitor liver and kidney function and potassium levels while on therapy. The patient verbalized understanding of the proper use and possible adverse effects of spironolactone.  All of the patient's questions and concerns were addressed.
Simponi Counseling:  I discussed with the patient the risks of golimumab including but not limited to myelosuppression, immunosuppression, autoimmune hepatitis, demyelinating diseases, lymphoma, and serious infections.  The patient understands that monitoring is required including a PPD at baseline and must alert us or the primary physician if symptoms of infection or other concerning signs are noted.
Wartpeel Counseling:  I discussed with the patient the risks of Wartpeel including but not limited to erythema, scaling, itching, weeping, crusting, and pain.
Terbinafine Pregnancy And Lactation Text: This medication is Pregnancy Category B and is considered safe during pregnancy. It is also excreted in breast milk and breast feeding isn't recommended.
Otezla Counseling: The side effects of Otezla were discussed with the patient, including but not limited to worsening or new depression, weight loss, diarrhea, nausea, upper respiratory tract infection, and headache. Patient instructed to call the office should any adverse effect occur.  The patient verbalized understanding of the proper use and possible adverse effects of Otezla.  All the patient's questions and concerns were addressed.
Bexarotene Pregnancy And Lactation Text: This medication is Pregnancy Category X and should not be given to women who are pregnant or may become pregnant. This medication should not be used if you are breast feeding.
Azelaic Acid Pregnancy And Lactation Text: This medication is considered safe during pregnancy and breast feeding.
Solaraze Counseling:  I discussed with the patient the risks of Solaraze including but not limited to erythema, scaling, itching, weeping, crusting, and pain.
Fluconazole Counseling:  Patient counseled regarding adverse effects of fluconazole including but not limited to headache, diarrhea, nausea, upset stomach, liver function test abnormalities, taste disturbance, and stomach pain.  There is a rare possibility of liver failure that can occur when taking fluconazole.  The patient understands that monitoring of LFTs and kidney function test may be required, especially at baseline. The patient verbalized understanding of the proper use and possible adverse effects of fluconazole.  All of the patient's questions and concerns were addressed.
Include Pregnancy/Lactation Warning?: No
Valtrex Counseling: I discussed with the patient the risks of valacyclovir including but not limited to kidney damage, nausea, vomiting and severe allergy.  The patient understands that if the infection seems to be worsening or is not improving, they are to call.
Clindamycin Counseling: I counseled the patient regarding use of clindamycin as an antibiotic for prophylactic and/or therapeutic purposes. Clindamycin is active against numerous classes of bacteria, including skin bacteria. Side effects may include nausea, diarrhea, gastrointestinal upset, rash, hives, yeast infections, and in rare cases, colitis.
Low Dose Naltrexone Pregnancy And Lactation Text: Naltrexone is pregnancy category C.  There have been no adequate and well-controlled studies in pregnant women.  It should be used in pregnancy only if the potential benefit justifies the potential risk to the fetus.   Limited data indicates that naltrexone is minimally excreted into breastmilk.
Topical Clindamycin Pregnancy And Lactation Text: This medication is Pregnancy Category B and is considered safe during pregnancy. It is unknown if it is excreted in breast milk.
Dapsone Counseling: I discussed with the patient the risks of dapsone including but not limited to hemolytic anemia, agranulocytosis, rashes, methemoglobinemia, kidney failure, peripheral neuropathy, headaches, GI upset, and liver toxicity.  Patients who start dapsone require monitoring including baseline LFTs and weekly CBCs for the first month, then every month thereafter.  The patient verbalized understanding of the proper use and possible adverse effects of dapsone.  All of the patient's questions and concerns were addressed.
Klisyri Counseling:  I discussed with the patient the risks of Klisyri including but not limited to erythema, scaling, itching, weeping, crusting, and pain.
Spironolactone Pregnancy And Lactation Text: This medication can cause feminization of the male fetus and should be avoided during pregnancy. The active metabolite is also found in breast milk.
Prednisone Counseling:  I discussed with the patient the risks of prolonged use of prednisone including but not limited to weight gain, insomnia, osteoporosis, mood changes, diabetes, susceptibility to infection, glaucoma and high blood pressure.  In cases where prednisone use is prolonged, patients should be monitored with blood pressure checks, serum glucose levels and an eye exam.  Additionally, the patient may need to be placed on GI prophylaxis, PCP prophylaxis, and calcium and vitamin D supplementation and/or a bisphosphonate.  The patient verbalized understanding of the proper use and the possible adverse effects of prednisone.  All of the patient's questions and concerns were addressed.
Cimzia Counseling:  I discussed with the patient the risks of Cimzia including but not limited to immunosuppression, allergic reactions and infections.  The patient understands that monitoring is required including a PPD at baseline and must alert us or the primary physician if symptoms of infection or other concerning signs are noted.
Otezla Pregnancy And Lactation Text: This medication is Pregnancy Category C and it isn't known if it is safe during pregnancy. It is unknown if it is excreted in breast milk.
Xolair Counseling:  Patient informed of potential adverse effects including but not limited to fever, muscle aches, rash and allergic reactions.  The patient verbalized understanding of the proper use and possible adverse effects of Xolair.  All of the patient's questions and concerns were addressed.
Quinolones Counseling:  I discussed with the patient the risks of fluoroquinolones including but not limited to GI upset, allergic reaction, drug rash, diarrhea, dizziness, photosensitivity, yeast infections, liver function test abnormalities, tendonitis/tendon rupture.
Rinvoq Pregnancy And Lactation Text: Based on animal studies, Rinvoq may cause embryo-fetal harm when administered to pregnant women.  The medication should not be used in pregnancy.  Breastfeeding is not recommended during treatment and for 6 days after the last dose.
Benzoyl Peroxide Counseling: Patient counseled that medicine may cause skin irritation and bleach clothing.  In the event of skin irritation, the patient was advised to reduce the amount of the drug applied or use it less frequently.   The patient verbalized understanding of the proper use and possible adverse effects of benzoyl peroxide.  All of the patient's questions and concerns were addressed.
Dutasteride Male Counseling: Dustasteride Counseling:  I discussed with the patient the risks of use of dutasteride including but not limited to decreased libido, decreased ejaculate volume, and gynecomastia. Women who can become pregnant should not handle medication.  All of the patient's questions and concerns were addressed.
Protopic Counseling: Patient may experience a mild burning sensation during topical application. Protopic is not approved in children less than 2 years of age. There have been case reports of hematologic and skin malignancies in patients using topical calcineurin inhibitors although causality is questionable.
Opioid Counseling: I discussed with the patient the potential side effects of opioids including but not limited to addiction, altered mental status, and depression. I stressed avoiding alcohol, benzodiazepines, muscle relaxants and sleep aids unless specifically okayed by a physician. The patient verbalized understanding of the proper use and possible adverse effects of opioids. All of the patient's questions and concerns were addressed. They were instructed to flush the remaining pills down the toilet if they did not need them for pain.
Ilumya Counseling: I discussed with the patient the risks of tildrakizumab including but not limited to immunosuppression, malignancy, posterior leukoencephalopathy syndrome, and serious infections.  The patient understands that monitoring is required including a PPD at baseline and must alert us or the primary physician if symptoms of infection or other concerning signs are noted.
Topical Ketoconazole Counseling: Patient counseled that this medication may cause skin irritation or allergic reactions.  In the event of skin irritation, the patient was advised to reduce the amount of the drug applied or use it less frequently.   The patient verbalized understanding of the proper use and possible adverse effects of ketoconazole.  All of the patient's questions and concerns were addressed.
Fluconazole Pregnancy And Lactation Text: This medication is Pregnancy Category C and it isn't know if it is safe during pregnancy. It is also excreted in breast milk.
Isotretinoin Counseling: Patient should get monthly blood tests, not donate blood, not drive at night if vision affected, not share medication, and not undergo elective surgery for 6 months after tx completed. Side effects reviewed, pt to contact office should one occur.
Dapsone Pregnancy And Lactation Text: This medication is Pregnancy Category C and is not considered safe during pregnancy or breast feeding.
Klisyri Pregnancy And Lactation Text: It is unknown if this medication can harm a developing fetus or if it is excreted in breast milk.
Cimetidine Counseling:  I discussed with the patient the risks of Cimetidine including but not limited to gynecomastia, headache, diarrhea, nausea, drowsiness, arrhythmias, pancreatitis, skin rashes, psychosis, bone marrow suppression and kidney toxicity.
Albendazole Counseling:  I discussed with the patient the risks of albendazole including but not limited to cytopenia, kidney damage, nausea/vomiting and severe allergy.  The patient understands that this medication is being used in an off-label manner.
Clindamycin Pregnancy And Lactation Text: This medication can be used in pregnancy if certain situations. Clindamycin is also present in breast milk.
Niacinamide Counseling: I recommended taking niacin or niacinamide, also know as vitamin B3, twice daily. Recent evidence suggests that taking vitamin B3 (500 mg twice daily) can reduce the risk of actinic keratoses and non-melanoma skin cancers. Side effects of vitamin B3 include flushing and headache.
Solaraze Pregnancy And Lactation Text: This medication is Pregnancy Category B and is considered safe. There is some data to suggest avoiding during the third trimester. It is unknown if this medication is excreted in breast milk.
Xolair Pregnancy And Lactation Text: This medication is Pregnancy Category B and is considered safe during pregnancy. This medication is excreted in breast milk.
Benzoyl Peroxide Pregnancy And Lactation Text: This medication is Pregnancy Category C. It is unknown if benzoyl peroxide is excreted in breast milk.
Cimzia Pregnancy And Lactation Text: This medication crosses the placenta but can be considered safe in certain situations. Cimzia may be excreted in breast milk.
Valtrex Pregnancy And Lactation Text: this medication is Pregnancy Category B and is considered safe during pregnancy. This medication is not directly found in breast milk but it's metabolite acyclovir is present.
Elidel Counseling: Patient may experience a mild burning sensation during topical application. Elidel is not approved in children less than 2 years of age. There have been case reports of hematologic and skin malignancies in patients using topical calcineurin inhibitors although causality is questionable.
Dutasteride Female Counseling: Dutasteride Counseling:  I discussed with the patient the risks of use of dutasteride including but not limited to decreased libido and sexual dysfunction. Explained the teratogenic nature of the medication and stressed the importance of not getting pregnant during treatment. All of the patient's questions and concerns were addressed.
Sotyktu Counseling:  I discussed the most common side effects of Sotyktu including: common cold, sore throat, sinus infections, cold sores, canker sores, folliculitis, and acne.  I also discussed more serious side effects of Sotyktu including but not limited to: serious allergic reactions; increased risk for infections such as TB; cancers such as lymphomas; rhabdomyolysis and elevated CPK; and elevated triglycerides and liver enzymes. 
Skyrizi Counseling: I discussed with the patient the risks of risankizumab-rzaa including but not limited to immunosuppression, and serious infections.  The patient understands that monitoring is required including a PPD at baseline and must alert us or the primary physician if symptoms of infection or other concerning signs are noted.
Azathioprine Counseling:  I discussed with the patient the risks of azathioprine including but not limited to myelosuppression, immunosuppression, hepatotoxicity, lymphoma, and infections.  The patient understands that monitoring is required including baseline LFTs, Creatinine, possible TPMP genotyping and weekly CBCs for the first month and then every 2 weeks thereafter.  The patient verbalized understanding of the proper use and possible adverse effects of azathioprine.  All of the patient's questions and concerns were addressed.
Isotretinoin Pregnancy And Lactation Text: This medication is Pregnancy Category X and is considered extremely dangerous during pregnancy. It is unknown if it is excreted in breast milk.
Winlevi Counseling:  I discussed with the patient the risks of topical clascoterone including but not limited to erythema, scaling, itching, and stinging. Patient voiced their understanding.
Doxycycline Counseling:  Patient counseled regarding possible photosensitivity and increased risk for sunburn.  Patient instructed to avoid sunlight, if possible.  When exposed to sunlight, patients should wear protective clothing, sunglasses, and sunscreen.  The patient was instructed to call the office immediately if the following severe adverse effects occur:  hearing changes, easy bruising/bleeding, severe headache, or vision changes.  The patient verbalized understanding of the proper use and possible adverse effects of doxycycline.  All of the patient's questions and concerns were addressed.
Niacinamide Pregnancy And Lactation Text: These medications are considered safe during pregnancy.
Griseofulvin Counseling:  I discussed with the patient the risks of griseofulvin including but not limited to photosensitivity, cytopenia, liver damage, nausea/vomiting and severe allergy.  The patient understands that this medication is best absorbed when taken with a fatty meal (e.g., ice cream or french fries).
Albendazole Pregnancy And Lactation Text: This medication is Pregnancy Category C and it isn't known if it is safe during pregnancy. It is also excreted in breast milk.
Minoxidil Counseling: Minoxidil is a topical medication which can increase blood flow where it is applied. It is uncertain how this medication increases hair growth. Side effects are uncommon and include stinging and allergic reactions.
Opioid Pregnancy And Lactation Text: These medications can lead to premature delivery and should be avoided during pregnancy. These medications are also present in breast milk in small amounts.
Soolantra Counseling: I discussed with the patients the risks of topial Soolantra. This is a medicine which decreases the number of mites and inflammation in the skin. You experience burning, stinging, eye irritation or allergic reactions.  Please call our office if you develop any problems from using this medication.
Protopic Pregnancy And Lactation Text: This medication is Pregnancy Category C. It is unknown if this medication is excreted in breast milk when applied topically.
Oxybutynin Counseling:  I discussed with the patient the risks of oxybutynin including but not limited to skin rash, drowsiness, dry mouth, difficulty urinating, and blurred vision.
Dutasteride Pregnancy And Lactation Text: This medication is absolutely contraindicated in women, especially during pregnancy and breast feeding. Feminization of male fetuses is possible if taking while pregnant.
Cosentyx Counseling:  I discussed with the patient the risks of Cosentyx including but not limited to worsening of Crohn's disease, immunosuppression, allergic reactions and infections.  The patient understands that monitoring is required including a PPD at baseline and must alert us or the primary physician if symptoms of infection or other concerning signs are noted.
Gabapentin Counseling: I discussed with the patient the risks of gabapentin including but not limited to dizziness, somnolence, fatigue and ataxia.
Winlevi Pregnancy And Lactation Text: This medication is considered safe during pregnancy and breastfeeding.
Azathioprine Pregnancy And Lactation Text: This medication is Pregnancy Category D and isn't considered safe during pregnancy. It is unknown if this medication is excreted in breast milk.
Carac Counseling:  I discussed with the patient the risks of Carac including but not limited to erythema, scaling, itching, weeping, crusting, and pain.
Sotyktu Pregnancy And Lactation Text: There is insufficient data to evaluate whether or not Sotyktu is safe to use during pregnancy.   It is not known if Sotyktu passes into breast milk and whether or not it is safe to use when breastfeeding.  
Nsaids Counseling: NSAID Counseling: I discussed with the patient that NSAIDs should be taken with food. Prolonged use of NSAIDs can result in the development of stomach ulcers.  Patient advised to stop taking NSAIDs if abdominal pain occurs.  The patient verbalized understanding of the proper use and possible adverse effects of NSAIDs.  All of the patient's questions and concerns were addressed.
High Dose Vitamin A Counseling: Side effects reviewed, pt to contact office should one occur.
Ivermectin Counseling:  Patient instructed to take medication on an empty stomach with a full glass of water.  Patient informed of potential adverse effects including but not limited to nausea, diarrhea, dizziness, itching, and swelling of the extremities or lymph nodes.  The patient verbalized understanding of the proper use and possible adverse effects of ivermectin.  All of the patient's questions and concerns were addressed.
Qbrexza Counseling:  I discussed with the patient the risks of Qbrexza including but not limited to headache, mydriasis, blurred vision, dry eyes, nasal dryness, dry mouth, dry throat, dry skin, urinary hesitation, and constipation.  Local skin reactions including erythema, burning, stinging, and itching can also occur.
Doxycycline Pregnancy And Lactation Text: This medication is Pregnancy Category D and not consider safe during pregnancy. It is also excreted in breast milk but is considered safe for shorter treatment courses.
Cibinqo Counseling: I discussed with the patient the risks of Cibinqo therapy including but not limited to common cold, nausea, headache, cold sores, increased blood CPK levels, dizziness, UTIs, fatigue, acne, and vomitting. Live vaccines should be avoided.  This medication has been linked to serious infections; higher rate of mortality; malignancy and lymphoproliferative disorders; major adverse cardiovascular events; thrombosis; thrombocytopenia and lymphopenia; lipid elevations; and retinal detachment.
Rifampin Counseling: I discussed with the patient the risks of rifampin including but not limited to liver damage, kidney damage, red-orange body fluids, nausea/vomiting and severe allergy.
Topical Metronidazole Counseling: Metronidazole is a topical antibiotic medication. You may experience burning, stinging, redness, or allergic reactions.  Please call our office if you develop any problems from using this medication.
Erivedge Counseling- I discussed with the patient the risks of Erivedge including but not limited to nausea, vomiting, diarrhea, constipation, weight loss, changes in the sense of taste, decreased appetite, muscle spasms, and hair loss.  The patient verbalized understanding of the proper use and possible adverse effects of Erivedge.  All of the patient's questions and concerns were addressed.
Soolantra Pregnancy And Lactation Text: This medication is Pregnancy Category C. This medication is considered safe during breast feeding.
Infliximab Counseling:  I discussed with the patient the risks of infliximab including but not limited to myelosuppression, immunosuppression, autoimmune hepatitis, demyelinating diseases, lymphoma, and serious infections.  The patient understands that monitoring is required including a PPD at baseline and must alert us or the primary physician if symptoms of infection or other concerning signs are noted.
Azithromycin Counseling:  I discussed with the patient the risks of azithromycin including but not limited to GI upset, allergic reaction, drug rash, diarrhea, and yeast infections.
Griseofulvin Pregnancy And Lactation Text: This medication is Pregnancy Category X and is known to cause serious birth defects. It is unknown if this medication is excreted in breast milk but breast feeding should be avoided.
VTAMA Counseling: I discussed with the patient that VTAMA is not for use in the eyes, mouth or mouth. They should call the office if they develop any signs of allergic reactions to VTAMA. The patient verbalized understanding of the proper use and possible adverse effects of VTAMA.  All of the patient's questions and concerns were addressed.
Eucrisa Counseling: Patient may experience a mild burning sensation during topical application. Eucrisa is not approved in children less than 2 years of age.
Finasteride Male Counseling: Finasteride Counseling:  I discussed with the patient the risks of use of finasteride including but not limited to decreased libido, decreased ejaculate volume, gynecomastia, and depression. Women should not handle medication.  All of the patient's questions and concerns were addressed.
Xeljanz Counseling: I discussed with the patient the risks of Xeljanz therapy including increased risk of infection, liver issues, headache, diarrhea, or cold symptoms. Live vaccines should be avoided. They were instructed to call if they have any problems.
Doxepin Counseling:  Patient advised that the medication is sedating and not to drive a car after taking this medication. Patient informed of potential adverse effects including but not limited to dry mouth, urinary retention, and blurry vision.  The patient verbalized understanding of the proper use and possible adverse effects of doxepin.  All of the patient's questions and concerns were addressed.
Arava Counseling:  Patient counseled regarding adverse effects of Arava including but not limited to nausea, vomiting, abnormalities in liver function tests. Patients may develop mouth sores, rash, diarrhea, and abnormalities in blood counts. The patient understands that monitoring is required including LFTs and blood counts.  There is a rare possibility of scarring of the liver and lung problems that can occur when taking methotrexate. Persistent nausea, loss of appetite, pale stools, dark urine, cough, and shortness of breath should be reported immediately. Patient advised to discontinue Arava treatment and consult with a physician prior to attempting conception. The patient will have to undergo a treatment to eliminate Arava from the body prior to conception.
SSKI Counseling:  I discussed with the patient the risks of SSKI including but not limited to thyroid abnormalities, metallic taste, GI upset, fever, headache, acne, arthralgias, paraesthesias, lymphadenopathy, easy bleeding, arrhythmias, and allergic reaction.
Stelara Counseling:  I discussed with the patient the risks of ustekinumab including but not limited to immunosuppression, malignancy, posterior leukoencephalopathy syndrome, and serious infections.  The patient understands that monitoring is required including a PPD at baseline and must alert us or the primary physician if symptoms of infection or other concerning signs are noted.
Cellcept Counseling:  I discussed with the patient the risks of mycophenolate mofetil including but not limited to infection/immunosuppression, GI upset, hypokalemia, hypercholesterolemia, bone marrow suppression, lymphoproliferative disorders, malignancy, GI ulceration/bleed/perforation, colitis, interstitial lung disease, kidney failure, progressive multifocal leukoencephalopathy, and birth defects.  The patient understands that monitoring is required including a baseline creatinine and regular CBC testing. In addition, patient must alert us immediately if symptoms of infection or other concerning signs are noted.
Rifampin Pregnancy And Lactation Text: This medication is Pregnancy Category C and it isn't know if it is safe during pregnancy. It is also excreted in breast milk and should not be used if you are breast feeding.
Propranolol Counseling:  I discussed with the patient the risks of propranolol including but not limited to low heart rate, low blood pressure, low blood sugar, restlessness and increased cold sensitivity. They should call the office if they experience any of these side effects.
High Dose Vitamin A Pregnancy And Lactation Text: High dose vitamin A therapy is contraindicated during pregnancy and breast feeding.
Cibinqo Pregnancy And Lactation Text: It is unknown if this medication will adversely affect pregnancy or breast feeding.  You should not take this medication if you are currently pregnant or planning a pregnancy or while breastfeeding.
Glycopyrrolate Counseling:  I discussed with the patient the risks of glycopyrrolate including but not limited to skin rash, drowsiness, dry mouth, difficulty urinating, and blurred vision.
Itraconazole Counseling:  I discussed with the patient the risks of itraconazole including but not limited to liver damage, nausea/vomiting, neuropathy, and severe allergy.  The patient understands that this medication is best absorbed when taken with acidic beverages such as non-diet cola or ginger ale.  The patient understands that monitoring is required including baseline LFTs and repeat LFTs at intervals.  The patient understands that they are to contact us or the primary physician if concerning signs are noted.
Topical Retinoid counseling:  Patient advised to apply a pea-sized amount only at bedtime and wait 30 minutes after washing their face before applying.  If too drying, patient may add a non-comedogenic moisturizer. The patient verbalized understanding of the proper use and possible adverse effects of retinoids.  All of the patient's questions and concerns were addressed.
Azithromycin Pregnancy And Lactation Text: This medication is considered safe during pregnancy and is also secreted in breast milk.
Erythromycin Counseling:  I discussed with the patient the risks of erythromycin including but not limited to GI upset, allergic reaction, drug rash, diarrhea, increase in liver enzymes, and yeast infections.
Topical Metronidazole Pregnancy And Lactation Text: This medication is Pregnancy Category B and considered safe during pregnancy.  It is also considered safe to use while breastfeeding.
Nsaids Pregnancy And Lactation Text: These medications are considered safe up to 30 weeks gestation. It is excreted in breast milk.
Sski Pregnancy And Lactation Text: This medication is Pregnancy Category D and isn't considered safe during pregnancy. It is excreted in breast milk.
Cyclophosphamide Pregnancy And Lactation Text: This medication is Pregnancy Category D and it isn't considered safe during pregnancy. This medication is excreted in breast milk.
Dupixent Counseling: I discussed with the patient the risks of dupilumab including but not limited to eye infection and irritation, cold sores, injection site reactions, worsening of asthma, allergic reactions and increased risk of parasitic infection.  Live vaccines should be avoided while taking dupilumab. Dupilumab will also interact with certain medications such as warfarin and cyclosporine. The patient understands that monitoring is required and they must alert us or the primary physician if symptoms of infection or other concerning signs are noted.
Mirvaso Counseling: Mirvaso is a topical medication which can decrease superficial blood flow where applied. Side effects are uncommon and include stinging, redness and allergic reactions.
Propranolol Pregnancy And Lactation Text: This medication is Pregnancy Category C and it isn't known if it is safe during pregnancy. It is excreted in breast milk.
Litfulo Counseling: I discussed with the patient the risks of Litfulo therapy including but not limited to upper respiratory tract infections, shingles, cold sores, and nausea. Live vaccines should be avoided.  This medication has been linked to serious infections; higher rate of mortality; malignancy and lymphoproliferative disorders; major adverse cardiovascular events; thrombosis; gastrointestinal perforations; neutropenia; lymphopenia; anemia; liver enzyme elevations; and lipid elevations.
Calcipotriene Counseling:  I discussed with the patient the risks of calcipotriene including but not limited to erythema, scaling, itching, and irritation.
Sarecycline Counseling: Patient advised regarding possible photosensitivity and discoloration of the teeth, skin, lips, tongue and gums.  Patient instructed to avoid sunlight, if possible.  When exposed to sunlight, patients should wear protective clothing, sunglasses, and sunscreen.  The patient was instructed to call the office immediately if the following severe adverse effects occur:  hearing changes, easy bruising/bleeding, severe headache, or vision changes.  The patient verbalized understanding of the proper use and possible adverse effects of sarecycline.  All of the patient's questions and concerns were addressed.
Xelgeorgiaz Pregnancy And Lactation Text: This medication is Pregnancy Category D and is not considered safe during pregnancy.  The risk during breast feeding is also uncertain.
Finasteride Female Counseling: Finasteride Counseling:  I discussed with the patient the risks of use of finasteride including but not limited to decreased libido and sexual dysfunction. Explained the teratogenic nature of the medication and stressed the importance of not getting pregnant during treatment. All of the patient's questions and concerns were addressed.
Rituxan Counseling:  I discussed with the patient the risks of Rituxan infusions. Side effects can include infusion reactions, severe drug rashes including mucocutaneous reactions, reactivation of latent hepatitis and other infections and rarely progressive multifocal leukoencephalopathy.  All of the patient's questions and concerns were addressed.
Topical Steroids Counseling: I discussed with the patient that prolonged use of topical steroids can result in the increased appearance of superficial blood vessels (telangiectasias), lightening (hypopigmentation) and thinning of the skin (atrophy).  Patient understands to avoid using high potency steroids in skin folds, the groin or the face.  The patient verbalized understanding of the proper use and possible adverse effects of topical steroids.  All of the patient's questions and concerns were addressed.
Olanzapine Counseling- I discussed with the patient the common side effects of olanzapine including but are not limited to: lack of energy, dry mouth, increased appetite, sleepiness, tremor, constipation, dizziness, changes in behavior, or restlessness.  Explained that teenagers are more likely to experience headaches, abdominal pain, pain in the arms or legs, tiredness, and sleepiness.  Serious side effects include but are not limited: increased risk of death in elderly patients who are confused, have memory loss, or dementia-related psychosis; hyperglycemia; increased cholesterol and triglycerides; and weight gain.
Erythromycin Pregnancy And Lactation Text: This medication is Pregnancy Category B and is considered safe during pregnancy. It is also excreted in breast milk.

## 2024-03-27 NOTE — PROCEDURE: CURETTAGE AND DESTRUCTION
Detail Level: Detailed
Biopsy Photograph Reviewed: Yes
Accession # (Optional): [id number]
Number Of Curettages: 3
Size Of Lesion In Cm: 0.7
Size Of Lesion After Curettage: 1.1
Add Intralesional Injection: No
Concentration (Mg/Ml Or Millions Of Plaque Forming Units/Cc): 0.01
Total Volume (Ccs): 1
Anesthesia Type: 1% lidocaine with epinephrine and a 1:10 solution of 8.4% sodium bicarbonate
Cautery Type: electrodesiccation
What Was Performed First?: Curettage
Final Size Statement: The size of the lesion after curettage was
Additional Information: (Optional): The wound was cleaned, and a pressure dressing was applied.  The patient received detailed post-op instructions.
Consent was obtained from the patient. The risks, benefits and alternatives to therapy were discussed in detail. Specifically, the risks of infection, scarring, bleeding, prolonged wound healing, nerve injury, incomplete removal, allergy to anesthesia and recurrence were addressed. Alternatives to ED&C, such as: surgical removal and XRT were also discussed.  Prior to the procedure, the treatment site was clearly identified and confirmed by the patient. All components of Universal Protocol/PAUSE Rule completed.
Post-Care Instructions: I reviewed with the patient in detail post-care instructions. Patient is to keep the area dry for 48 hours, and not to engage in any swimming until the area is healed. Should the patient develop any fevers, chills, bleeding, severe pain patient will contact the office immediately.
Bill As A Line Item Or As Units: Line Item

## 2024-03-29 ENCOUNTER — APPOINTMENT (OUTPATIENT)
Dept: PHYSICAL THERAPY | Facility: MEDICAL CENTER | Age: 59
End: 2024-03-29
Attending: PHYSICAL MEDICINE & REHABILITATION
Payer: COMMERCIAL

## 2024-04-01 ENCOUNTER — TELEPHONE (OUTPATIENT)
Dept: NEUROLOGY | Facility: MEDICAL CENTER | Age: 59
End: 2024-04-01
Payer: COMMERCIAL

## 2024-04-01 DIAGNOSIS — G43.D0 ABDOMINAL MIGRAINE, NOT INTRACTABLE: ICD-10-CM

## 2024-04-01 DIAGNOSIS — M50.30 DDD (DEGENERATIVE DISC DISEASE), CERVICAL: ICD-10-CM

## 2024-04-01 DIAGNOSIS — G89.29 CHRONIC NONINTRACTABLE HEADACHE, UNSPECIFIED HEADACHE TYPE: ICD-10-CM

## 2024-04-01 DIAGNOSIS — G89.29 CHRONIC NECK PAIN: ICD-10-CM

## 2024-04-01 DIAGNOSIS — R51.9 CHRONIC NONINTRACTABLE HEADACHE, UNSPECIFIED HEADACHE TYPE: ICD-10-CM

## 2024-04-01 DIAGNOSIS — M47.812 CERVICAL SPONDYLOSIS: ICD-10-CM

## 2024-04-01 DIAGNOSIS — R51.9 NONINTRACTABLE HEADACHE, UNSPECIFIED CHRONICITY PATTERN, UNSPECIFIED HEADACHE TYPE: ICD-10-CM

## 2024-04-01 DIAGNOSIS — M48.02 CERVICAL SPINAL STENOSIS: ICD-10-CM

## 2024-04-01 DIAGNOSIS — M62.838 MUSCLE SPASM: ICD-10-CM

## 2024-04-01 DIAGNOSIS — M54.2 CHRONIC NECK PAIN: ICD-10-CM

## 2024-04-01 RX ORDER — SUMATRIPTAN 100 MG/1
100 TABLET, FILM COATED ORAL
Qty: 10 TABLET | Refills: 3 | Status: SHIPPED | OUTPATIENT
Start: 2024-04-01

## 2024-04-01 RX ORDER — MELOXICAM 15 MG/1
15 TABLET ORAL
Qty: 60 TABLET | Refills: 0 | Status: SHIPPED | OUTPATIENT
Start: 2024-04-01

## 2024-04-01 RX ORDER — SUMATRIPTAN 100 MG/1
TABLET, FILM COATED ORAL
Qty: 10 TABLET | Refills: 12 | Status: SHIPPED | OUTPATIENT
Start: 2024-04-01

## 2024-04-01 NOTE — TELEPHONE ENCOUNTER
Received Refill PA request via [facility]  for Sumatriptan (Imitrex) 100 MG Tabs. (Quantity:27, Day Supply:90) - Copay $30.00 or $10.00 #9/30 DS (No PA req'd)     Insurance: [facility] ([employer])  Member ID:  [id number]  BIN: [id number]  PCN: [facility]  Group: [id number]     Ran Test claim via [facility] for a $30.00 copay. Will outreach to patient to offer specialty pharmacy services and or release to preferred pharmacy

## 2024-04-01 NOTE — TELEPHONE ENCOUNTER
Received request via: Patient    Medication Name/Dosage Sumatriptan 100mg     When was medication last prescribed na    How many refills were previously provided na    How many Refills does he patient have left from last prescription na    Was the patient seen in the last year in this department? No   Date of last office visit na     Per last Neurology Office Visit, when was the date of next follow up visit set for?                            Date of office visit follow up request na     Does the patient have an upcoming appointment? No   If yes, when na             If no, schedule appointment na    Does the patient have [facility] and need 100 day supply (blood pressure, diabetes and cholesterol meds only)? Patient does not have SCP

## 2024-04-09 DIAGNOSIS — F41.8 ANTICIPATORY ANXIETY: ICD-10-CM

## 2024-04-09 RX ORDER — PROPRANOLOL HYDROCHLORIDE 20 MG/1
TABLET ORAL
Qty: 90 TABLET | Refills: 0 | Status: SHIPPED | OUTPATIENT
Start: 2024-04-09

## 2024-05-14 ENCOUNTER — TELEPHONE (OUTPATIENT)
Dept: PHYSICAL MEDICINE AND REHAB | Facility: MEDICAL CENTER | Age: 59
End: 2024-05-14

## 2024-05-14 DIAGNOSIS — M47.812 CERVICAL SPONDYLOSIS: ICD-10-CM

## 2024-05-14 DIAGNOSIS — M54.2 CHRONIC NECK PAIN: ICD-10-CM

## 2024-05-14 DIAGNOSIS — M50.30 DDD (DEGENERATIVE DISC DISEASE), CERVICAL: ICD-10-CM

## 2024-05-14 DIAGNOSIS — M54.12 CERVICAL RADICULOPATHY: ICD-10-CM

## 2024-05-14 DIAGNOSIS — G89.29 CHRONIC NECK PAIN: ICD-10-CM

## 2024-05-14 DIAGNOSIS — M62.838 MUSCLE SPASM: ICD-10-CM

## 2024-05-14 DIAGNOSIS — M48.02 CERVICAL SPINAL STENOSIS: ICD-10-CM

## 2024-05-14 RX ORDER — GABAPENTIN 100 MG/1
100-300 CAPSULE ORAL NIGHTLY PRN
Qty: 90 CAPSULE | Refills: 11 | Status: SHIPPED | OUTPATIENT
Start: 2024-05-14

## 2024-05-14 NOTE — TELEPHONE ENCOUNTER
Caller Name: Christa  Call Back Number: [phone]    How would the patient prefer to be contacted with a response: Phone call OK to leave a detailed message    Pt requesting a new Rx for Gabpentin 100 mg, taking 1 -3 caps at bedtime as needed.    Pt stated that this med is helping her pain a lot.    Thank you  Tatyana

## 2024-05-21 ENCOUNTER — TELEPHONE (OUTPATIENT)
Dept: MEDICAL GROUP | Facility: MEDICAL CENTER | Age: 59
End: 2024-05-21

## 2024-05-21 DIAGNOSIS — Z71.84 TRAVEL ADVICE ENCOUNTER: ICD-10-CM

## 2024-05-21 DIAGNOSIS — M54.2 NECK PAIN: ICD-10-CM

## 2024-05-21 RX ORDER — ZOLPIDEM TARTRATE 10 MG/1
10 TABLET ORAL NIGHTLY PRN
Qty: 90 TABLET | Refills: 1 | Status: SHIPPED | OUTPATIENT
Start: 2024-05-21 | End: 2024-08-19

## 2024-05-21 RX ORDER — LORAZEPAM 1 MG/1
1 TABLET ORAL EVERY 4 HOURS PRN
Qty: 90 TABLET | Refills: 0 | Status: SHIPPED | OUTPATIENT
Start: 2024-05-21 | End: 2024-08-19

## 2024-07-09 DIAGNOSIS — F41.8 ANTICIPATORY ANXIETY: ICD-10-CM

## 2024-07-09 RX ORDER — PROPRANOLOL HYDROCHLORIDE 20 MG/1
TABLET ORAL
Qty: 90 TABLET | Refills: 2 | Status: SHIPPED | OUTPATIENT
Start: 2024-07-09

## 2024-08-07 ENCOUNTER — RESEARCH ENCOUNTER (OUTPATIENT)
Dept: RESEARCH | Facility: MEDICAL CENTER | Age: 59
End: 2024-08-07

## 2024-08-08 ENCOUNTER — APPOINTMENT (RX ONLY)
Dept: URBAN - METROPOLITAN AREA CLINIC 6 | Facility: CLINIC | Age: 59
Setting detail: DERMATOLOGY
End: 2024-08-08

## 2024-08-08 ENCOUNTER — HOSPITAL ENCOUNTER (OUTPATIENT)
Dept: LAB | Facility: MEDICAL CENTER | Age: 59
End: 2024-08-08
Attending: FAMILY MEDICINE
Payer: COMMERCIAL

## 2024-08-08 DIAGNOSIS — Z85.828 PERSONAL HISTORY OF OTHER MALIGNANT NEOPLASM OF SKIN: ICD-10-CM | Status: STABLE

## 2024-08-08 DIAGNOSIS — L82.1 OTHER SEBORRHEIC KERATOSIS: ICD-10-CM

## 2024-08-08 DIAGNOSIS — Z87.2 PERSONAL HISTORY OF DISEASES OF THE SKIN AND SUBCUTANEOUS TISSUE: ICD-10-CM

## 2024-08-08 DIAGNOSIS — L81.4 OTHER MELANIN HYPERPIGMENTATION: ICD-10-CM

## 2024-08-08 DIAGNOSIS — L82.0 INFLAMED SEBORRHEIC KERATOSIS: ICD-10-CM

## 2024-08-08 DIAGNOSIS — Z71.89 OTHER SPECIFIED COUNSELING: ICD-10-CM

## 2024-08-08 DIAGNOSIS — D18.0 HEMANGIOMA: ICD-10-CM

## 2024-08-08 DIAGNOSIS — D22 MELANOCYTIC NEVI: ICD-10-CM

## 2024-08-08 PROBLEM — D22.5 MELANOCYTIC NEVI OF TRUNK: Status: ACTIVE | Noted: 2024-08-08

## 2024-08-08 PROBLEM — D18.01 HEMANGIOMA OF SKIN AND SUBCUTANEOUS TISSUE: Status: ACTIVE | Noted: 2024-08-08

## 2024-08-08 PROCEDURE — ? LIQUID NITROGEN

## 2024-08-08 PROCEDURE — ? COUNSELING

## 2024-08-08 PROCEDURE — 99213 OFFICE O/P EST LOW 20 MIN: CPT | Mod: 25

## 2024-08-08 PROCEDURE — 17110 DESTRUCTION B9 LES UP TO 14: CPT

## 2024-08-08 PROCEDURE — ? MEDICATION COUNSELING

## 2024-08-08 PROCEDURE — ? SUNSCREEN TREATMENT REGIMEN

## 2024-08-08 ASSESSMENT — LOCATION DETAILED DESCRIPTION DERM
LOCATION DETAILED: RIGHT LATERAL PROXIMAL UPPER ARM
LOCATION DETAILED: LEFT SUPERIOR MEDIAL LOWER BACK
LOCATION DETAILED: LEFT LATERAL DORSAL FOOT
LOCATION DETAILED: RIGHT RADIAL DORSAL HAND
LOCATION DETAILED: RIGHT LATERAL SUPERIOR CHEST
LOCATION DETAILED: EPIGASTRIC SKIN
LOCATION DETAILED: LEFT INFERIOR FOREHEAD
LOCATION DETAILED: LEFT SUPERIOR MEDIAL UPPER BACK
LOCATION DETAILED: MIDDLE STERNUM
LOCATION DETAILED: LEFT KNEE
LOCATION DETAILED: PERIUMBILICAL SKIN
LOCATION DETAILED: LEFT RADIAL DORSAL HAND

## 2024-08-08 ASSESSMENT — LOCATION ZONE DERM
LOCATION ZONE: TRUNK
LOCATION ZONE: FACE
LOCATION ZONE: HAND
LOCATION ZONE: FEET
LOCATION ZONE: ARM
LOCATION ZONE: LEG

## 2024-08-08 ASSESSMENT — LOCATION SIMPLE DESCRIPTION DERM
LOCATION SIMPLE: LEFT UPPER BACK
LOCATION SIMPLE: RIGHT HAND
LOCATION SIMPLE: LEFT FOREHEAD
LOCATION SIMPLE: ABDOMEN
LOCATION SIMPLE: LEFT KNEE
LOCATION SIMPLE: RIGHT UPPER ARM
LOCATION SIMPLE: LEFT FOOT
LOCATION SIMPLE: LEFT HAND
LOCATION SIMPLE: LEFT LOWER BACK
LOCATION SIMPLE: CHEST

## 2024-08-08 NOTE — PROCEDURE: LIQUID NITROGEN
Include Z78.9 (Other Specified Conditions Influencing Health Status) As An Associated Diagnosis?: No
Show Aperture Variable?: Yes
Number Of Freeze-Thaw Cycles: 3 freeze-thaw cycles
Post-Care Instructions: I reviewed with the patient in detail post-care instructions. Patient is to wear sunprotection, and avoid picking at any of the treated lesions. Pt may apply Vaseline to crusted or scabbing areas.
Medical Necessity Clause: This procedure was medically necessary because the lesions that were treated were:
Spray Paint Text: The liquid nitrogen was applied to the skin utilizing a spray paint frosting technique.
Detail Level: Detailed
Medical Necessity Information: It is in your best interest to select a reason for this procedure from the list below. All of these items fulfill various CMS LCD requirements except the new and changing color options.
Consent: The patient's verbal consent was obtained including but not limited to risks of crusting, scabbing, blistering, scarring, darker or lighter pigmentary change, recurrence, incomplete removal and infection.

## 2024-08-13 ENCOUNTER — OFFICE VISIT (OUTPATIENT)
Dept: PHYSICAL MEDICINE AND REHAB | Facility: MEDICAL CENTER | Age: 59
End: 2024-08-13
Payer: COMMERCIAL

## 2024-08-13 VITALS
WEIGHT: 128.2 LBS | SYSTOLIC BLOOD PRESSURE: 139 MMHG | DIASTOLIC BLOOD PRESSURE: 79 MMHG | OXYGEN SATURATION: 96 % | HEIGHT: 65 IN | BODY MASS INDEX: 21.36 KG/M2 | HEART RATE: 74 BPM | TEMPERATURE: 98 F

## 2024-08-13 DIAGNOSIS — M50.30 DDD (DEGENERATIVE DISC DISEASE), CERVICAL: ICD-10-CM

## 2024-08-13 DIAGNOSIS — M47.812 CERVICAL SPONDYLOSIS: ICD-10-CM

## 2024-08-13 DIAGNOSIS — M48.02 CERVICAL SPINAL STENOSIS: ICD-10-CM

## 2024-08-13 DIAGNOSIS — M54.2 CHRONIC NECK PAIN: ICD-10-CM

## 2024-08-13 DIAGNOSIS — G89.29 CHRONIC NECK PAIN: ICD-10-CM

## 2024-08-13 PROCEDURE — 3078F DIAST BP <80 MM HG: CPT | Performed by: PHYSICAL MEDICINE & REHABILITATION

## 2024-08-13 PROCEDURE — 99214 OFFICE O/P EST MOD 30 MIN: CPT | Performed by: PHYSICAL MEDICINE & REHABILITATION

## 2024-08-13 PROCEDURE — G2211 COMPLEX E/M VISIT ADD ON: HCPCS | Performed by: PHYSICAL MEDICINE & REHABILITATION

## 2024-08-13 PROCEDURE — 3075F SYST BP GE 130 - 139MM HG: CPT | Performed by: PHYSICAL MEDICINE & REHABILITATION

## 2024-08-13 PROCEDURE — 1125F AMNT PAIN NOTED PAIN PRSNT: CPT | Performed by: PHYSICAL MEDICINE & REHABILITATION

## 2024-08-13 ASSESSMENT — PATIENT HEALTH QUESTIONNAIRE - PHQ9
CLINICAL INTERPRETATION OF PHQ2 SCORE: 2
5. POOR APPETITE OR OVEREATING: 0 - NOT AT ALL
SUM OF ALL RESPONSES TO PHQ QUESTIONS 1-9: 4

## 2024-08-13 ASSESSMENT — FIBROSIS 4 INDEX: FIB4 SCORE: 0.84

## 2024-08-13 ASSESSMENT — PAIN SCALES - GENERAL: PAINLEVEL: 8=MODERATE-SEVERE PAIN

## 2024-08-14 ENCOUNTER — APPOINTMENT (OUTPATIENT)
Dept: RADIOLOGY | Facility: REHABILITATION | Age: 59
End: 2024-08-14
Attending: PHYSICAL MEDICINE & REHABILITATION
Payer: COMMERCIAL

## 2024-08-14 ENCOUNTER — HOSPITAL ENCOUNTER (OUTPATIENT)
Facility: REHABILITATION | Age: 59
End: 2024-08-14
Attending: PHYSICAL MEDICINE & REHABILITATION | Admitting: PHYSICAL MEDICINE & REHABILITATION
Payer: COMMERCIAL

## 2024-08-14 VITALS
RESPIRATION RATE: 16 BRPM | TEMPERATURE: 97.9 F | OXYGEN SATURATION: 95 % | HEART RATE: 60 BPM | SYSTOLIC BLOOD PRESSURE: 166 MMHG | WEIGHT: 123.68 LBS | HEIGHT: 65 IN | BODY MASS INDEX: 20.61 KG/M2 | DIASTOLIC BLOOD PRESSURE: 85 MMHG

## 2024-08-14 PROCEDURE — 64491 INJ PARAVERT F JNT C/T 2 LEV: CPT

## 2024-08-14 PROCEDURE — 64490 INJ PARAVERT F JNT C/T 1 LEV: CPT

## 2024-08-14 PROCEDURE — 64492 INJ PARAVERT F JNT C/T 3 LEV: CPT

## 2024-08-14 PROCEDURE — 700101 HCHG RX REV CODE 250

## 2024-08-14 PROCEDURE — 700111 HCHG RX REV CODE 636 W/ 250 OVERRIDE (IP): Mod: JZ

## 2024-08-14 RX ORDER — LIDOCAINE HYDROCHLORIDE 10 MG/ML
INJECTION, SOLUTION EPIDURAL; INFILTRATION; INTRACAUDAL; PERINEURAL
Status: COMPLETED
Start: 2024-08-14 | End: 2024-08-14

## 2024-08-14 RX ORDER — LIDOCAINE HYDROCHLORIDE 20 MG/ML
INJECTION, SOLUTION EPIDURAL; INFILTRATION; INTRACAUDAL; PERINEURAL
Status: COMPLETED
Start: 2024-08-14 | End: 2024-08-14

## 2024-08-14 RX ADMIN — LIDOCAINE HYDROCHLORIDE 10 MG/100 ML: 10 INJECTION, SOLUTION EPIDURAL; INFILTRATION; INTRACAUDAL; PERINEURAL at 08:36

## 2024-08-14 RX ADMIN — LIDOCAINE HYDROCHLORIDE 10 ML: 20 INJECTION, SOLUTION EPIDURAL; INFILTRATION; INTRACAUDAL at 08:36

## 2024-08-14 ASSESSMENT — FIBROSIS 4 INDEX: FIB4 SCORE: 0.84

## 2024-08-14 ASSESSMENT — PAIN DESCRIPTION - PAIN TYPE: TYPE: CHRONIC PAIN

## 2024-08-14 NOTE — PROGRESS NOTES
Follow up patient note  Interventional spine and Pain  Physiatry (physical medicine and  Rehabilitation)       Chief complaint:   Chief Complaint   Patient presents with    Follow-Up     Neck pain          HISTORY    Please see new patient note by Dr Briscoe,  for more details.     HPI  Patient identification: Christa Juarez MD ,  1965,   With Diagnoses of Cervical spondylosis, Cervical spinal stenosis, Chronic neck pain, and DDD (degenerative disc disease), cervical were pertinent to this visit.     History of Present Illness  The patient is a 59-year-old female here for follow-up with neck pain.    Following her senior living in 2024, she increased her exercise regimen, which exacerbated her neck pain. As a left-handed individual, she practices yoga and limits her down dog movements due to a previous elbow issue. However, her neck pain worsened after playing pickleball. She has been taking Neurontin 300 mg daily for the past 3 months, which provides some relief. She also takes Tylenol, Aleve, and meloxicam, and a muscle relaxant as needed. She tries to manage her medications at night. She took 2 Aleve at 10:30 today and consumed a lot of water and tuna. She played pickleball twice this year and believes she may have injured her neck while playing. She forgot to take her Neurontin on Saturday night, but took 2 Tylenol. The following day, her neck seized up and she developed a migraine. She took sumatriptan, which alleviated her neck pain and migraine. She took 3 Neurontin that night and another migraine at the same time. She increased her water intake and suspected insufficient protein intake. She took sumatriptan for 2 consecutive days when she traveled to [place]. She took Neurontin and feminine the next day and felt okay. Her pain is constant, except occasionally when she wakes up in the morning. She has taken 3 sumatriptan in total in the last 2 months. She performs neck exercises and stretches  when the pain becomes severe, but the pain radiates to her back and neck. A massage 4 weeks ago made her uncomfortable. She occasionally experiences numbness and tingling in her hands while sleeping, which resolves with movement and shaking. The pain is located medial to her shoulder blade. She has had this pain for 20 years. She was given Percocet, which provided some relief. She occasionally takes Ativan and Ambien.  The neck pain is acute on chronic present for greater than 6 months overall.  Worse on the right side of the cervical spine in the mid to lower cervical spine associated periscapular pain.  This is 8 out of 10 in intensity.    Elbow pain is stable    ROS Red Flags :   Fever, Chills, Sweats: Denies  Involuntary Weight Loss: Denies  Bowel/Bladder Incontinence: Denies  Saddle Anesthesia: Denies        PMHx:   Past Medical History:   Diagnosis Date    Anesthesia     Nausea in recovery    Arthritis     High cholesterol        PSHx:   Past Surgical History:   Procedure Laterality Date    BONE BIOPSY Left 2016    Procedure: WRIST BIOPSY WITH POSSIBLE BONE GRAFT ALLO ;  Surgeon: Benjamin Menezes M.D.;  Location: SURGERY [facility];  Service:     BLEPHAROPLASTY      CHOLECYSTECTOMY      PRIMARY C SECTION  1998           Family history   Family History   Problem Relation Age of Onset    Cancer Other     Cancer Paternal Aunt     Arthritis Mother     Hyperlipidemia Mother     Arthritis Brother     Cancer Brother         Prostate         Medications:   Outpatient Medications Marked as Taking for the 24 encounter (Office Visit) with Akira Briscoe M.D.   Medication Sig Dispense Refill    propranolol (INDERAL) 20 MG Tab TAKE 1 TABLET BY MOUTH 30 MIN BEFORE THE EVENT FOR ANTICIPATORY ANXIETY. 90 Tablet 2    zolpidem (AMBIEN) 10 MG Tab Take 1 Tablet by mouth at bedtime as needed for Sleep for up to 90 days. 90 Tablet 1    LORazepam (ATIVAN) 1 MG Tab Take 1 Tablet by mouth every four  hours as needed for Anxiety for up to 90 days. 90 Tablet 0    gabapentin (NEURONTIN) 100 MG Cap Take 1-3 Capsules by mouth at bedtime as needed (pain). 90 Capsule 11    meloxicam (MOBIC) 15 MG tablet Take 1 Tablet by mouth 1 time a day as needed (pain). Do not take other NSAIDs. No refills. 60 Tablet 0    sumatriptan (IMITREX) 100 MG tablet TAKE 1 TABLET BY MOUTH EVERY DAY AS NEEDED FOR HEAD ACHE 10 Tablet 12    sumatriptan (IMITREX) 100 MG tablet Take 1 Tablet by mouth one time as needed for Migraine for up to 1 dose. 10 Tablet 3    methocarbamol (ROBAXIN) 500 MG Tab Take 1 Tablet by mouth 3 times a day as needed (neck pain). 30 Tablet 1    Calcium-Magnesium-Vitamin D (CALCIUM MAGNESIUM PO) Take  by mouth every day.          Current Outpatient Medications on File Prior to Visit   Medication Sig Dispense Refill    propranolol (INDERAL) 20 MG Tab TAKE 1 TABLET BY MOUTH 30 MIN BEFORE THE EVENT FOR ANTICIPATORY ANXIETY. 90 Tablet 2    zolpidem (AMBIEN) 10 MG Tab Take 1 Tablet by mouth at bedtime as needed for Sleep for up to 90 days. 90 Tablet 1    LORazepam (ATIVAN) 1 MG Tab Take 1 Tablet by mouth every four hours as needed for Anxiety for up to 90 days. 90 Tablet 0    gabapentin (NEURONTIN) 100 MG Cap Take 1-3 Capsules by mouth at bedtime as needed (pain). 90 Capsule 11    meloxicam (MOBIC) 15 MG tablet Take 1 Tablet by mouth 1 time a day as needed (pain). Do not take other NSAIDs. No refills. 60 Tablet 0    sumatriptan (IMITREX) 100 MG tablet TAKE 1 TABLET BY MOUTH EVERY DAY AS NEEDED FOR HEAD ACHE 10 Tablet 12    sumatriptan (IMITREX) 100 MG tablet Take 1 Tablet by mouth one time as needed for Migraine for up to 1 dose. 10 Tablet 3    methocarbamol (ROBAXIN) 500 MG Tab Take 1 Tablet by mouth 3 times a day as needed (neck pain). 30 Tablet 1    Calcium-Magnesium-Vitamin D (CALCIUM MAGNESIUM PO) Take  by mouth every day.      ondansetron (ZOFRAN) 4 MG Tab tablet TAKE 1 TABLET BY MOUTH TWICE A DAY AS NEEDED FOR NAUSEA  AND VOMITING      COVID-19mRNA Bival Vacc Pfizer (PFIZER COVID-19 BIVALENT) 30 MCG/0.3ML Suspension injection Inject  into the shoulder, thigh, or buttocks. (Patient not taking: Reported on 4/24/2023) 0.3 mL 0     No current facility-administered medications on file prior to visit.         Allergies:   Allergies   Allergen Reactions    Penicillins Hives    Diagnostic X-Ray Materials Hives, Itching and Rash    Zanaflex [Tizanidine]      headache       Social Hx:   Social History     Socioeconomic History    Marital status:      Spouse name: Not on file    Number of children: Not on file    Years of education: Not on file    Highest education level: Professional school degree (e.g., MD, DDS, DVM, AMISHA)   Occupational History    Not on file   Tobacco Use    Smoking status: Never    Smokeless tobacco: Never   Vaping Use    Vaping status: Never Used   Substance and Sexual Activity    Alcohol use: Yes     Comment: Occasional    Drug use: No    Sexual activity: Yes     Comment: Radiation Oncologist   Other Topics Concern    Not on file   Social History Narrative    Not on file     Social Determinants of Health     Financial Resource Strain: Low Risk  (8/2/2020)    Overall Financial Resource Strain (CARDIA)     Difficulty of Paying Living Expenses: Not hard at all   Food Insecurity: No Food Insecurity (8/2/2020)    Hunger Vital Sign     Worried About Running Out of Food in the Last Year: Never true     Ran Out of Food in the Last Year: Never true   Transportation Needs: No Transportation Needs (8/2/2020)    PRAPARE - Transportation     Lack of Transportation (Medical): No     Lack of Transportation (Non-Medical): No   Physical Activity: Sufficiently Active (8/2/2020)    Exercise Vital Sign     Days of Exercise per Week: 4 days     Minutes of Exercise per Session: 50 min   Stress: No Stress Concern Present (8/2/2020)    [employer] - Occupational Stress Questionnaire     Feeling of Stress :  "Not at all   Social Connections: Moderately Isolated (8/2/2020)    Social Connection and Isolation Panel [NHANES]     Frequency of Communication with Friends and Family: More than three times a week     Frequency of Social Gatherings with Friends and Family: Three times a week     Attends Taoism Services: Never     Active Member of Clubs or Organizations: No     Attends Club or Organization Meetings: Never     Marital Status:    Intimate Partner Violence: Not on file   Housing Stability: Low Risk  (8/2/2020)    Housing Stability Vital Sign     Unable to Pay for Housing in the Last Year: No     Number of Places Lived in the Last Year: 1     Unstable Housing in the Last Year: No         EXAMINATION     Physical Exam:   Vitals: /79 (BP Location: Left arm, Patient Position: Sitting, BP Cuff Size: Adult)   Pulse 74   Temp 36.7 °C (98 °F) (Temporal)   Ht 1.651 m (5' 5\")   Wt 58.2 kg (128 lb 3.2 oz)   SpO2 96%     Constitutional:   Body Habitus: Body mass index is 21.33 kg/m².  Cooperation: Fully cooperates with exam  Appearance: Well-groomed no disheveled    Respiratory-  breathing comfortable on room air, no audible wheezing  Cardiovascular- capillary refills less than 2 seconds. No lower extremity edema is noted.   Psychiatric- alert and oriented ×3. Normal affect.    MSK and Neuro: -    Cervical spine  There are no signs of infection around the injection sites.     decreased active range of motion with flexion, lateral flexion, and rotation bilaterally.   There is decreased active range of motion with cervical extension.      Palpation:   Tenderness to palpation throughout the cervical spine: negative bilaterally        Cervical spine Special tests  Neuro tension  Spurling's maneuver negative bilaterally    Cervical facet loading maneuver  positive right worst at C3-4, C4-5, C5-6, negative left        Key points for the international standards for neurological classification of spinal cord injury " "(ISNCSCI) to light touch.     Dermatome R L   C4 2 2   C5 2 2   C6 2 2   C7 2 2   C8 2 2   T1 2 2   T2 2 2                                         Motor Exam Upper Extremities   ? Myotome R L   Shoulder flexion C5 5 5   Elbow flexion C5 5 5   Wrist extension C6 5 5   Elbow extension C7 5 5   Finger flexion C8 5 5   Finger abduction T1 5 5         MEDICAL DECISION MAKING    DATA    Labs:   Lab Results   Component Value Date/Time    SODIUM 138 12/14/2023 08:15 AM    POTASSIUM 4.0 12/14/2023 08:15 AM    CHLORIDE 101 12/14/2023 08:15 AM    CO2 29 12/14/2023 08:15 AM    GLUCOSE 89 12/14/2023 08:15 AM    BUN 12 12/14/2023 08:15 AM    CREATININE 0.78 12/14/2023 08:15 AM        No results found for: \"PROTHROMBTM\", \"INR\"     Lab Results   Component Value Date/Time    WBC 4.4 (L) 12/14/2023 08:15 AM    RBC 4.52 12/14/2023 08:15 AM    HEMOGLOBIN 14.0 12/14/2023 08:15 AM    HEMATOCRIT 42.6 12/14/2023 08:15 AM    MCV 94.2 12/14/2023 08:15 AM    MCH 31.0 12/14/2023 08:15 AM    MCHC 32.9 12/14/2023 08:15 AM    MPV 10.8 12/14/2023 08:15 AM    NEUTSPOLYS 49.60 12/14/2023 08:15 AM    LYMPHOCYTES 38.40 12/14/2023 08:15 AM    MONOCYTES 10.00 12/14/2023 08:15 AM    EOSINOPHILS 1.10 12/14/2023 08:15 AM    BASOPHILS 0.90 12/14/2023 08:15 AM        No results found for: \"HBA1C\"       Imaging:   I personally reviewed following images    MRI cervical spine 2/20/2023  T2 subacute compression fracture.  Multilevel degenerative changes with facet arthropathy and foraminal stenosis.    MRI left elbow 11/3/2023      I reviewed the following radiology reports                     Results for orders placed in visit on 02/24/23    MR-CERVICAL SPINE-W/O  FINDINGS:  There is subacute fracture along the anterior superior corner of T2 vertebral body. Bone marrow edema is noted along the superior portion. There is no disruption of the posterior cortex. The anterior and posterior longitudinal ligaments are intact. There   is no epidural hemorrhage. There " is no evidence of spinal cord injury.     The cervical spine maintains normal height and alignment. There is no fracture or dislocation. There is no pathologic marrow infiltration. There is no focal osseous lesion.     At the level of C2-3,there is no spinal or neural foraminal stenosis.     At the level of C3-4,there is minimal disc bulge. There is no spinal or neural foraminal stenosis.     At the level of C4-5,there is disc degeneration. There is diffuse disc bulge. Bilateral uncinate joint arthropathy is seen. There is effacement of the ventral subarachnoid space. There is severe right and mild left neural foraminal stenosis.     At the level of C5-6,there is disc degeneration. Bilateral uncinate joint arthropathy is seen. There is severe bilateral neural foraminal stenosis. There is effacement of the ventral subarachnoid space. There is flattening of the anterior spinal cord   contour. There is mild central canal stenosis.     At the level of C6-7,there is disc degeneration. There is diffuse disc bulge. Bilateral uncinate joint arthropathy is seen. There is moderate bilateral neural foraminal stenosis. There is no central canal stenosis.     At the level of C7-T1,there is no spinal or neural foraminal stenosis.     The visualized posterior fossa structures appear normal within limits.  The cervical spinal cord does not demonstrate any mass or abnormal T2 signal intensity.     There is tiny 4 mm sized left thyroid nodule.    Impression  1.  There is subacute fracture along the anterior superior corner of T2 vertebral body. Bone marrow edema is noted along the superior portion. There is no disruption of the posterior cortex. The anterior and posterior longitudinal ligaments are intact.  There is no epidural hemorrhage. This is new since the previous study. There is no evidence of spinal cord injury.  2.  Multifocal degenerative disease in the cervical spine as described above. There has been no significant  interval change.  3.  There is tiny 4 mm sized left thyroid nodule stable since the previous study.      Results for orders placed during the hospital encounter of 12/20/19    MR-LUMBAR SPINE-W/O    Impression  1.  Mild degenerative changes of the lumbar spine  2.  No areas of high-grade central canal or neural foraminal narrowing  3.  Mild LEFT neural foraminal narrowing at L4-L5  4.  Segment 1 hepatic lesion, incompletely assessed but likely a cyst or hemangioma        Results for orders placed during the hospital encounter of 12/20/19    MR-LUMBAR SPINE-W/O    Impression  1.  Mild degenerative changes of the lumbar spine  2.  No areas of high-grade central canal or neural foraminal narrowing  3.  Mild LEFT neural foraminal narrowing at L4-L5  4.  Segment 1 hepatic lesion, incompletely assessed but likely a cyst or hemangioma                                                                       Results for orders placed during the hospital encounter of 11/11/22    CT-ABDOMEN-PELVIS WITH    Impression  1.  There is no evidence of bowel obstruction or acute inflammation.  2.  There is air in the splenic flexure and descending colon and stool in the right colon but there is no colonic dilatation.  3.  No extraluminal air or free intraperitoneal air.  4.  No abnormal vascular enhancement.  5.  Gallbladder is surgically absent with probable physiologic dilatation of the intrahepatic and extra hepatic biliary tree. No obstructive mass or stone.  6.  Normal appendix.  7.  Incidental benign hepatic hemangiomas.                    Results for orders placed in visit on 02/23/23    DX-CERVICAL SPINE-2 OR 3 VIEWS    Impression  1.  Multilevel facet arthropathy and endplate spurring    2.  Minimal posterior subluxations at C4-5 and C5-6        Results for orders placed in visit on 03/30/23    DX-ELBOW-COMPLETE 3+ LEFT      Results for orders placed in visit on 07/25/22    DX-FOOT-COMPLETE 3+ RIGHT                                Results for orders placed in visit on 21    DX-WRIST-COMPLETE 3+ LEFT         DIAGNOSIS   Visit Diagnoses     ICD-10-CM   1. Cervical spondylosis  M47.812   2. Cervical spinal stenosis  M48.02   3. Chronic neck pain  M54.2    G89.29   4. DDD (degenerative disc disease), cervical  M50.30         ASSESSMENT and PLAN:     Christa Agustín Juarez MD  1965 female      Christa was seen today for follow-up.    Diagnoses and all orders for this visit:    Cervical spondylosis  -     Referral to Physical Medicine Rehab  -     Referral to Physical Medicine Rehab    Cervical spinal stenosis    Chronic neck pain    DDD (degenerative disc disease), cervical       The patient is failed conservative treatments with medication management, home exercise program from the direction of physical therapy/physician including the past 6 months.  I have ordered a RIGHT diagnostic medial branch block targeting the C3-4, C4-5, and C5-6 facet joints #1 and #2.  Procedure #2 is only to be done if #1 is a positive block.    The risks benefits and alternatives to this procedure were discussed and the patient wishes to proceed with the procedure. Risks include but are not limited to damage to surrounding structures, infection, bleeding, worsening of pain which can be permanent, weakness which can be permanent. Benefits include pain relief, improved function. Alternatives includes not doing the procedure.   If there are 2 positive blocks I would consider the patient for radiofrequency neurotomy of the previously blocked nerves.    Medications: Continue gabapentin as needed      Follow up: After the above diagnostic procedures      Thank you for allowing me to participate in the care of this patient. If you have any questions please not hesitate to contact me.             Please note that this dictation was created using voice recognition software. I have made every reasonable attempt to correct obvious errors but there may be errors of  grammar and content that I may have overlooked prior to finalization of this note.      Akira Briscoe MD  Interventional Spine and Sports Physiatry  Physical Medicine and Rehabilitation  [facility]

## 2024-08-14 NOTE — H&P (VIEW-ONLY)
Follow up patient note  Interventional spine and Pain  Physiatry (physical medicine and  Rehabilitation)       Chief complaint:   Chief Complaint   Patient presents with    Follow-Up     Neck pain          HISTORY    Please see new patient note by Dr Briscoe,  for more details.     HPI  Patient identification: Christa Juarez MD ,  1965,   With Diagnoses of Cervical spondylosis, Cervical spinal stenosis, Chronic neck pain, and DDD (degenerative disc disease), cervical were pertinent to this visit.     History of Present Illness  The patient is a 59-year-old female here for follow-up with neck pain.    Following her jail in 2024, she increased her exercise regimen, which exacerbated her neck pain. As a left-handed individual, she practices yoga and limits her down dog movements due to a previous elbow issue. However, her neck pain worsened after playing pickleball. She has been taking Neurontin 300 mg daily for the past 3 months, which provides some relief. She also takes Tylenol, Aleve, and meloxicam, and a muscle relaxant as needed. She tries to manage her medications at night. She took 2 Aleve at 10:30 today and consumed a lot of water and tuna. She played pickleball twice this year and believes she may have injured her neck while playing. She forgot to take her Neurontin on Saturday night, but took 2 Tylenol. The following day, her neck seized up and she developed a migraine. She took sumatriptan, which alleviated her neck pain and migraine. She took 3 Neurontin that night and another migraine at the same time. She increased her water intake and suspected insufficient protein intake. She took sumatriptan for 2 consecutive days when she traveled to [place]. She took Neurontin and feminine the next day and felt okay. Her pain is constant, except occasionally when she wakes up in the morning. She has taken 3 sumatriptan in total in the last 2 months. She performs neck exercises and stretches  when the pain becomes severe, but the pain radiates to her back and neck. A massage 4 weeks ago made her uncomfortable. She occasionally experiences numbness and tingling in her hands while sleeping, which resolves with movement and shaking. The pain is located medial to her shoulder blade. She has had this pain for 20 years. She was given Percocet, which provided some relief. She occasionally takes Ativan and Ambien.  The neck pain is acute on chronic present for greater than 6 months overall.  Worse on the right side of the cervical spine in the mid to lower cervical spine associated periscapular pain.  This is 8 out of 10 in intensity.    Elbow pain is stable    ROS Red Flags :   Fever, Chills, Sweats: Denies  Involuntary Weight Loss: Denies  Bowel/Bladder Incontinence: Denies  Saddle Anesthesia: Denies        PMHx:   Past Medical History:   Diagnosis Date    Anesthesia     Nausea in recovery    Arthritis     High cholesterol        PSHx:   Past Surgical History:   Procedure Laterality Date    BONE BIOPSY Left 2016    Procedure: WRIST BIOPSY WITH POSSIBLE BONE GRAFT ALLO ;  Surgeon: Benjamin Menezes M.D.;  Location: SURGERY [facility];  Service:     BLEPHAROPLASTY      CHOLECYSTECTOMY      PRIMARY C SECTION  1998           Family history   Family History   Problem Relation Age of Onset    Cancer Other     Cancer Paternal Aunt     Arthritis Mother     Hyperlipidemia Mother     Arthritis Brother     Cancer Brother         Prostate         Medications:   Outpatient Medications Marked as Taking for the 24 encounter (Office Visit) with Akira Briscoe M.D.   Medication Sig Dispense Refill    propranolol (INDERAL) 20 MG Tab TAKE 1 TABLET BY MOUTH 30 MIN BEFORE THE EVENT FOR ANTICIPATORY ANXIETY. 90 Tablet 2    zolpidem (AMBIEN) 10 MG Tab Take 1 Tablet by mouth at bedtime as needed for Sleep for up to 90 days. 90 Tablet 1    LORazepam (ATIVAN) 1 MG Tab Take 1 Tablet by mouth every four  hours as needed for Anxiety for up to 90 days. 90 Tablet 0    gabapentin (NEURONTIN) 100 MG Cap Take 1-3 Capsules by mouth at bedtime as needed (pain). 90 Capsule 11    meloxicam (MOBIC) 15 MG tablet Take 1 Tablet by mouth 1 time a day as needed (pain). Do not take other NSAIDs. No refills. 60 Tablet 0    sumatriptan (IMITREX) 100 MG tablet TAKE 1 TABLET BY MOUTH EVERY DAY AS NEEDED FOR HEAD ACHE 10 Tablet 12    sumatriptan (IMITREX) 100 MG tablet Take 1 Tablet by mouth one time as needed for Migraine for up to 1 dose. 10 Tablet 3    methocarbamol (ROBAXIN) 500 MG Tab Take 1 Tablet by mouth 3 times a day as needed (neck pain). 30 Tablet 1    Calcium-Magnesium-Vitamin D (CALCIUM MAGNESIUM PO) Take  by mouth every day.          Current Outpatient Medications on File Prior to Visit   Medication Sig Dispense Refill    propranolol (INDERAL) 20 MG Tab TAKE 1 TABLET BY MOUTH 30 MIN BEFORE THE EVENT FOR ANTICIPATORY ANXIETY. 90 Tablet 2    zolpidem (AMBIEN) 10 MG Tab Take 1 Tablet by mouth at bedtime as needed for Sleep for up to 90 days. 90 Tablet 1    LORazepam (ATIVAN) 1 MG Tab Take 1 Tablet by mouth every four hours as needed for Anxiety for up to 90 days. 90 Tablet 0    gabapentin (NEURONTIN) 100 MG Cap Take 1-3 Capsules by mouth at bedtime as needed (pain). 90 Capsule 11    meloxicam (MOBIC) 15 MG tablet Take 1 Tablet by mouth 1 time a day as needed (pain). Do not take other NSAIDs. No refills. 60 Tablet 0    sumatriptan (IMITREX) 100 MG tablet TAKE 1 TABLET BY MOUTH EVERY DAY AS NEEDED FOR HEAD ACHE 10 Tablet 12    sumatriptan (IMITREX) 100 MG tablet Take 1 Tablet by mouth one time as needed for Migraine for up to 1 dose. 10 Tablet 3    methocarbamol (ROBAXIN) 500 MG Tab Take 1 Tablet by mouth 3 times a day as needed (neck pain). 30 Tablet 1    Calcium-Magnesium-Vitamin D (CALCIUM MAGNESIUM PO) Take  by mouth every day.      ondansetron (ZOFRAN) 4 MG Tab tablet TAKE 1 TABLET BY MOUTH TWICE A DAY AS NEEDED FOR NAUSEA  AND VOMITING      COVID-19mRNA Bival Vacc Pfizer (PFIZER COVID-19 BIVALENT) 30 MCG/0.3ML Suspension injection Inject  into the shoulder, thigh, or buttocks. (Patient not taking: Reported on 4/24/2023) 0.3 mL 0     No current facility-administered medications on file prior to visit.         Allergies:   Allergies   Allergen Reactions    Penicillins Hives    Diagnostic X-Ray Materials Hives, Itching and Rash    Zanaflex [Tizanidine]      headache       Social Hx:   Social History     Socioeconomic History    Marital status:      Spouse name: Not on file    Number of children: Not on file    Years of education: Not on file    Highest education level: Professional school degree (e.g., MD, DDS, DVM, AMISHA)   Occupational History    Not on file   Tobacco Use    Smoking status: Never    Smokeless tobacco: Never   Vaping Use    Vaping status: Never Used   Substance and Sexual Activity    Alcohol use: Yes     Comment: Occasional    Drug use: No    Sexual activity: Yes     Comment: Radiation Oncologist   Other Topics Concern    Not on file   Social History Narrative    Not on file     Social Determinants of Health     Financial Resource Strain: Low Risk  (8/2/2020)    Overall Financial Resource Strain (CARDIA)     Difficulty of Paying Living Expenses: Not hard at all   Food Insecurity: No Food Insecurity (8/2/2020)    Hunger Vital Sign     Worried About Running Out of Food in the Last Year: Never true     Ran Out of Food in the Last Year: Never true   Transportation Needs: No Transportation Needs (8/2/2020)    PRAPARE - Transportation     Lack of Transportation (Medical): No     Lack of Transportation (Non-Medical): No   Physical Activity: Sufficiently Active (8/2/2020)    Exercise Vital Sign     Days of Exercise per Week: 4 days     Minutes of Exercise per Session: 50 min   Stress: No Stress Concern Present (8/2/2020)    [employer] [place] [employer] - Occupational Stress Questionnaire     Feeling of Stress :  "Not at all   Social Connections: Moderately Isolated (8/2/2020)    Social Connection and Isolation Panel [NHANES]     Frequency of Communication with Friends and Family: More than three times a week     Frequency of Social Gatherings with Friends and Family: Three times a week     Attends Congregation Services: Never     Active Member of Clubs or Organizations: No     Attends Club or Organization Meetings: Never     Marital Status:    Intimate Partner Violence: Not on file   Housing Stability: Low Risk  (8/2/2020)    Housing Stability Vital Sign     Unable to Pay for Housing in the Last Year: No     Number of Places Lived in the Last Year: 1     Unstable Housing in the Last Year: No         EXAMINATION     Physical Exam:   Vitals: /79 (BP Location: Left arm, Patient Position: Sitting, BP Cuff Size: Adult)   Pulse 74   Temp 36.7 °C (98 °F) (Temporal)   Ht 1.651 m (5' 5\")   Wt 58.2 kg (128 lb 3.2 oz)   SpO2 96%     Constitutional:   Body Habitus: Body mass index is 21.33 kg/m².  Cooperation: Fully cooperates with exam  Appearance: Well-groomed no disheveled    Respiratory-  breathing comfortable on room air, no audible wheezing  Cardiovascular- capillary refills less than 2 seconds. No lower extremity edema is noted.   Psychiatric- alert and oriented ×3. Normal affect.    MSK and Neuro: -    Cervical spine  There are no signs of infection around the injection sites.     decreased active range of motion with flexion, lateral flexion, and rotation bilaterally.   There is decreased active range of motion with cervical extension.      Palpation:   Tenderness to palpation throughout the cervical spine: negative bilaterally        Cervical spine Special tests  Neuro tension  Spurling's maneuver negative bilaterally    Cervical facet loading maneuver  positive right worst at C3-4, C4-5, C5-6, negative left        Key points for the international standards for neurological classification of spinal cord injury " "(ISNCSCI) to light touch.     Dermatome R L   C4 2 2   C5 2 2   C6 2 2   C7 2 2   C8 2 2   T1 2 2   T2 2 2                                         Motor Exam Upper Extremities   ? Myotome R L   Shoulder flexion C5 5 5   Elbow flexion C5 5 5   Wrist extension C6 5 5   Elbow extension C7 5 5   Finger flexion C8 5 5   Finger abduction T1 5 5         MEDICAL DECISION MAKING    DATA    Labs:   Lab Results   Component Value Date/Time    SODIUM 138 12/14/2023 08:15 AM    POTASSIUM 4.0 12/14/2023 08:15 AM    CHLORIDE 101 12/14/2023 08:15 AM    CO2 29 12/14/2023 08:15 AM    GLUCOSE 89 12/14/2023 08:15 AM    BUN 12 12/14/2023 08:15 AM    CREATININE 0.78 12/14/2023 08:15 AM        No results found for: \"PROTHROMBTM\", \"INR\"     Lab Results   Component Value Date/Time    WBC 4.4 (L) 12/14/2023 08:15 AM    RBC 4.52 12/14/2023 08:15 AM    HEMOGLOBIN 14.0 12/14/2023 08:15 AM    HEMATOCRIT 42.6 12/14/2023 08:15 AM    MCV 94.2 12/14/2023 08:15 AM    MCH 31.0 12/14/2023 08:15 AM    MCHC 32.9 12/14/2023 08:15 AM    MPV 10.8 12/14/2023 08:15 AM    NEUTSPOLYS 49.60 12/14/2023 08:15 AM    LYMPHOCYTES 38.40 12/14/2023 08:15 AM    MONOCYTES 10.00 12/14/2023 08:15 AM    EOSINOPHILS 1.10 12/14/2023 08:15 AM    BASOPHILS 0.90 12/14/2023 08:15 AM        No results found for: \"HBA1C\"       Imaging:   I personally reviewed following images    MRI cervical spine 2/20/2023  T2 subacute compression fracture.  Multilevel degenerative changes with facet arthropathy and foraminal stenosis.    MRI left elbow 11/3/2023      I reviewed the following radiology reports                     Results for orders placed in visit on 02/24/23    MR-CERVICAL SPINE-W/O  FINDINGS:  There is subacute fracture along the anterior superior corner of T2 vertebral body. Bone marrow edema is noted along the superior portion. There is no disruption of the posterior cortex. The anterior and posterior longitudinal ligaments are intact. There   is no epidural hemorrhage. There " is no evidence of spinal cord injury.     The cervical spine maintains normal height and alignment. There is no fracture or dislocation. There is no pathologic marrow infiltration. There is no focal osseous lesion.     At the level of C2-3,there is no spinal or neural foraminal stenosis.     At the level of C3-4,there is minimal disc bulge. There is no spinal or neural foraminal stenosis.     At the level of C4-5,there is disc degeneration. There is diffuse disc bulge. Bilateral uncinate joint arthropathy is seen. There is effacement of the ventral subarachnoid space. There is severe right and mild left neural foraminal stenosis.     At the level of C5-6,there is disc degeneration. Bilateral uncinate joint arthropathy is seen. There is severe bilateral neural foraminal stenosis. There is effacement of the ventral subarachnoid space. There is flattening of the anterior spinal cord   contour. There is mild central canal stenosis.     At the level of C6-7,there is disc degeneration. There is diffuse disc bulge. Bilateral uncinate joint arthropathy is seen. There is moderate bilateral neural foraminal stenosis. There is no central canal stenosis.     At the level of C7-T1,there is no spinal or neural foraminal stenosis.     The visualized posterior fossa structures appear normal within limits.  The cervical spinal cord does not demonstrate any mass or abnormal T2 signal intensity.     There is tiny 4 mm sized left thyroid nodule.    Impression  1.  There is subacute fracture along the anterior superior corner of T2 vertebral body. Bone marrow edema is noted along the superior portion. There is no disruption of the posterior cortex. The anterior and posterior longitudinal ligaments are intact.  There is no epidural hemorrhage. This is new since the previous study. There is no evidence of spinal cord injury.  2.  Multifocal degenerative disease in the cervical spine as described above. There has been no significant  interval change.  3.  There is tiny 4 mm sized left thyroid nodule stable since the previous study.      Results for orders placed during the hospital encounter of 12/20/19    MR-LUMBAR SPINE-W/O    Impression  1.  Mild degenerative changes of the lumbar spine  2.  No areas of high-grade central canal or neural foraminal narrowing  3.  Mild LEFT neural foraminal narrowing at L4-L5  4.  Segment 1 hepatic lesion, incompletely assessed but likely a cyst or hemangioma        Results for orders placed during the hospital encounter of 12/20/19    MR-LUMBAR SPINE-W/O    Impression  1.  Mild degenerative changes of the lumbar spine  2.  No areas of high-grade central canal or neural foraminal narrowing  3.  Mild LEFT neural foraminal narrowing at L4-L5  4.  Segment 1 hepatic lesion, incompletely assessed but likely a cyst or hemangioma                                                                       Results for orders placed during the hospital encounter of 11/11/22    CT-ABDOMEN-PELVIS WITH    Impression  1.  There is no evidence of bowel obstruction or acute inflammation.  2.  There is air in the splenic flexure and descending colon and stool in the right colon but there is no colonic dilatation.  3.  No extraluminal air or free intraperitoneal air.  4.  No abnormal vascular enhancement.  5.  Gallbladder is surgically absent with probable physiologic dilatation of the intrahepatic and extra hepatic biliary tree. No obstructive mass or stone.  6.  Normal appendix.  7.  Incidental benign hepatic hemangiomas.                    Results for orders placed in visit on 02/23/23    DX-CERVICAL SPINE-2 OR 3 VIEWS    Impression  1.  Multilevel facet arthropathy and endplate spurring    2.  Minimal posterior subluxations at C4-5 and C5-6        Results for orders placed in visit on 03/30/23    DX-ELBOW-COMPLETE 3+ LEFT      Results for orders placed in visit on 07/25/22    DX-FOOT-COMPLETE 3+ RIGHT                                Results for orders placed in visit on 21    DX-WRIST-COMPLETE 3+ LEFT         DIAGNOSIS   Visit Diagnoses     ICD-10-CM   1. Cervical spondylosis  M47.812   2. Cervical spinal stenosis  M48.02   3. Chronic neck pain  M54.2    G89.29   4. DDD (degenerative disc disease), cervical  M50.30         ASSESSMENT and PLAN:     Christa Agustín Juarez MD  1965 female      Christa was seen today for follow-up.    Diagnoses and all orders for this visit:    Cervical spondylosis  -     Referral to Physical Medicine Rehab  -     Referral to Physical Medicine Rehab    Cervical spinal stenosis    Chronic neck pain    DDD (degenerative disc disease), cervical       The patient is failed conservative treatments with medication management, home exercise program from the direction of physical therapy/physician including the past 6 months.  I have ordered a RIGHT diagnostic medial branch block targeting the C3-4, C4-5, and C5-6 facet joints #1 and #2.  Procedure #2 is only to be done if #1 is a positive block.    The risks benefits and alternatives to this procedure were discussed and the patient wishes to proceed with the procedure. Risks include but are not limited to damage to surrounding structures, infection, bleeding, worsening of pain which can be permanent, weakness which can be permanent. Benefits include pain relief, improved function. Alternatives includes not doing the procedure.   If there are 2 positive blocks I would consider the patient for radiofrequency neurotomy of the previously blocked nerves.    Medications: Continue gabapentin as needed      Follow up: After the above diagnostic procedures      Thank you for allowing me to participate in the care of this patient. If you have any questions please not hesitate to contact me.             Please note that this dictation was created using voice recognition software. I have made every reasonable attempt to correct obvious errors but there may be errors of  grammar and content that I may have overlooked prior to finalization of this note.      Akira Briscoe MD  Interventional Spine and Sports Physiatry  Physical Medicine and Rehabilitation  [facility]

## 2024-08-14 NOTE — INTERVAL H&P NOTE
H&P reviewed. The patient was examined and there are no changes to the H&P       Lungs clear to auscultation bilaterally.  No abdominal tenderness.  Heart regular rate and rhythm.  Vitals reviewed.    Proceed with the originally scheduled procedure.  The risks, benefits and alternatives were discussed.  The patient understands these.    Pre-Op Diagnosis Codes:      * Cervical spondylosis [M47.812]  Procedure(s):  Medial branch blocks targeting the RIGHT C3-4, C4-5, and C5-6 facet joints with fluoroscopic guidance #1    Akira Briscoe MD  Physical Medicine and Rehabilitation  Interventional Spine and Sports Physiatry  [facility]

## 2024-08-14 NOTE — H&P (VIEW-ONLY)
Follow up patient note  Interventional spine and Pain  Physiatry (physical medicine and  Rehabilitation)       Chief complaint:   Chief Complaint   Patient presents with    Follow-Up     Neck pain          HISTORY    Please see new patient note by Dr Briscoe,  for more details.     HPI  Patient identification: Christa Juarez MD ,  1965,   With Diagnoses of Cervical spondylosis, Cervical spinal stenosis, Chronic neck pain, and DDD (degenerative disc disease), cervical were pertinent to this visit.     History of Present Illness  The patient is a 59-year-old female here for follow-up with neck pain.    Following her FPC in 2024, she increased her exercise regimen, which exacerbated her neck pain. As a left-handed individual, she practices yoga and limits her down dog movements due to a previous elbow issue. However, her neck pain worsened after playing pickleball. She has been taking Neurontin 300 mg daily for the past 3 months, which provides some relief. She also takes Tylenol, Aleve, and meloxicam, and a muscle relaxant as needed. She tries to manage her medications at night. She took 2 Aleve at 10:30 today and consumed a lot of water and tuna. She played pickleball twice this year and believes she may have injured her neck while playing. She forgot to take her Neurontin on Saturday night, but took 2 Tylenol. The following day, her neck seized up and she developed a migraine. She took sumatriptan, which alleviated her neck pain and migraine. She took 3 Neurontin that night and another migraine at the same time. She increased her water intake and suspected insufficient protein intake. She took sumatriptan for 2 consecutive days when she traveled to [place]. She took Neurontin and feminine the next day and felt okay. Her pain is constant, except occasionally when she wakes up in the morning. She has taken 3 sumatriptan in total in the last 2 months. She performs neck exercises and stretches  when the pain becomes severe, but the pain radiates to her back and neck. A massage 4 weeks ago made her uncomfortable. She occasionally experiences numbness and tingling in her hands while sleeping, which resolves with movement and shaking. The pain is located medial to her shoulder blade. She has had this pain for 20 years. She was given Percocet, which provided some relief. She occasionally takes Ativan and Ambien.  The neck pain is acute on chronic present for greater than 6 months overall.  Worse on the right side of the cervical spine in the mid to lower cervical spine associated periscapular pain.  This is 8 out of 10 in intensity.    Elbow pain is stable    ROS Red Flags :   Fever, Chills, Sweats: Denies  Involuntary Weight Loss: Denies  Bowel/Bladder Incontinence: Denies  Saddle Anesthesia: Denies        PMHx:   Past Medical History:   Diagnosis Date    Anesthesia     Nausea in recovery    Arthritis     High cholesterol        PSHx:   Past Surgical History:   Procedure Laterality Date    BONE BIOPSY Left 2016    Procedure: WRIST BIOPSY WITH POSSIBLE BONE GRAFT ALLO ;  Surgeon: Benjamin Menezes M.D.;  Location: SURGERY [facility];  Service:     BLEPHAROPLASTY      CHOLECYSTECTOMY      PRIMARY C SECTION  1998           Family history   Family History   Problem Relation Age of Onset    Cancer Other     Cancer Paternal Aunt     Arthritis Mother     Hyperlipidemia Mother     Arthritis Brother     Cancer Brother         Prostate         Medications:   Outpatient Medications Marked as Taking for the 24 encounter (Office Visit) with Akira Briscoe M.D.   Medication Sig Dispense Refill    propranolol (INDERAL) 20 MG Tab TAKE 1 TABLET BY MOUTH 30 MIN BEFORE THE EVENT FOR ANTICIPATORY ANXIETY. 90 Tablet 2    zolpidem (AMBIEN) 10 MG Tab Take 1 Tablet by mouth at bedtime as needed for Sleep for up to 90 days. 90 Tablet 1    LORazepam (ATIVAN) 1 MG Tab Take 1 Tablet by mouth every four  hours as needed for Anxiety for up to 90 days. 90 Tablet 0    gabapentin (NEURONTIN) 100 MG Cap Take 1-3 Capsules by mouth at bedtime as needed (pain). 90 Capsule 11    meloxicam (MOBIC) 15 MG tablet Take 1 Tablet by mouth 1 time a day as needed (pain). Do not take other NSAIDs. No refills. 60 Tablet 0    sumatriptan (IMITREX) 100 MG tablet TAKE 1 TABLET BY MOUTH EVERY DAY AS NEEDED FOR HEAD ACHE 10 Tablet 12    sumatriptan (IMITREX) 100 MG tablet Take 1 Tablet by mouth one time as needed for Migraine for up to 1 dose. 10 Tablet 3    methocarbamol (ROBAXIN) 500 MG Tab Take 1 Tablet by mouth 3 times a day as needed (neck pain). 30 Tablet 1    Calcium-Magnesium-Vitamin D (CALCIUM MAGNESIUM PO) Take  by mouth every day.          Current Outpatient Medications on File Prior to Visit   Medication Sig Dispense Refill    propranolol (INDERAL) 20 MG Tab TAKE 1 TABLET BY MOUTH 30 MIN BEFORE THE EVENT FOR ANTICIPATORY ANXIETY. 90 Tablet 2    zolpidem (AMBIEN) 10 MG Tab Take 1 Tablet by mouth at bedtime as needed for Sleep for up to 90 days. 90 Tablet 1    LORazepam (ATIVAN) 1 MG Tab Take 1 Tablet by mouth every four hours as needed for Anxiety for up to 90 days. 90 Tablet 0    gabapentin (NEURONTIN) 100 MG Cap Take 1-3 Capsules by mouth at bedtime as needed (pain). 90 Capsule 11    meloxicam (MOBIC) 15 MG tablet Take 1 Tablet by mouth 1 time a day as needed (pain). Do not take other NSAIDs. No refills. 60 Tablet 0    sumatriptan (IMITREX) 100 MG tablet TAKE 1 TABLET BY MOUTH EVERY DAY AS NEEDED FOR HEAD ACHE 10 Tablet 12    sumatriptan (IMITREX) 100 MG tablet Take 1 Tablet by mouth one time as needed for Migraine for up to 1 dose. 10 Tablet 3    methocarbamol (ROBAXIN) 500 MG Tab Take 1 Tablet by mouth 3 times a day as needed (neck pain). 30 Tablet 1    Calcium-Magnesium-Vitamin D (CALCIUM MAGNESIUM PO) Take  by mouth every day.      ondansetron (ZOFRAN) 4 MG Tab tablet TAKE 1 TABLET BY MOUTH TWICE A DAY AS NEEDED FOR NAUSEA  AND VOMITING      COVID-19mRNA Bival Vacc Pfizer (PFIZER COVID-19 BIVALENT) 30 MCG/0.3ML Suspension injection Inject  into the shoulder, thigh, or buttocks. (Patient not taking: Reported on 4/24/2023) 0.3 mL 0     No current facility-administered medications on file prior to visit.         Allergies:   Allergies   Allergen Reactions    Penicillins Hives    Diagnostic X-Ray Materials Hives, Itching and Rash    Zanaflex [Tizanidine]      headache       Social Hx:   Social History     Socioeconomic History    Marital status:      Spouse name: Not on file    Number of children: Not on file    Years of education: Not on file    Highest education level: Professional school degree (e.g., MD, DDS, DVM, AMISHA)   Occupational History    Not on file   Tobacco Use    Smoking status: Never    Smokeless tobacco: Never   Vaping Use    Vaping status: Never Used   Substance and Sexual Activity    Alcohol use: Yes     Comment: Occasional    Drug use: No    Sexual activity: Yes     Comment: Radiation Oncologist   Other Topics Concern    Not on file   Social History Narrative    Not on file     Social Determinants of Health     Financial Resource Strain: Low Risk  (8/2/2020)    Overall Financial Resource Strain (CARDIA)     Difficulty of Paying Living Expenses: Not hard at all   Food Insecurity: No Food Insecurity (8/2/2020)    Hunger Vital Sign     Worried About Running Out of Food in the Last Year: Never true     Ran Out of Food in the Last Year: Never true   Transportation Needs: No Transportation Needs (8/2/2020)    PRAPARE - Transportation     Lack of Transportation (Medical): No     Lack of Transportation (Non-Medical): No   Physical Activity: Sufficiently Active (8/2/2020)    Exercise Vital Sign     Days of Exercise per Week: 4 days     Minutes of Exercise per Session: 50 min   Stress: No Stress Concern Present (8/2/2020)    [employer] - Occupational Stress Questionnaire     Feeling of Stress :  "Not at all   Social Connections: Moderately Isolated (8/2/2020)    Social Connection and Isolation Panel [NHANES]     Frequency of Communication with Friends and Family: More than three times a week     Frequency of Social Gatherings with Friends and Family: Three times a week     Attends Baptism Services: Never     Active Member of Clubs or Organizations: No     Attends Club or Organization Meetings: Never     Marital Status:    Intimate Partner Violence: Not on file   Housing Stability: Low Risk  (8/2/2020)    Housing Stability Vital Sign     Unable to Pay for Housing in the Last Year: No     Number of Places Lived in the Last Year: 1     Unstable Housing in the Last Year: No         EXAMINATION     Physical Exam:   Vitals: /79 (BP Location: Left arm, Patient Position: Sitting, BP Cuff Size: Adult)   Pulse 74   Temp 36.7 °C (98 °F) (Temporal)   Ht 1.651 m (5' 5\")   Wt 58.2 kg (128 lb 3.2 oz)   SpO2 96%     Constitutional:   Body Habitus: Body mass index is 21.33 kg/m².  Cooperation: Fully cooperates with exam  Appearance: Well-groomed no disheveled    Respiratory-  breathing comfortable on room air, no audible wheezing  Cardiovascular- capillary refills less than 2 seconds. No lower extremity edema is noted.   Psychiatric- alert and oriented ×3. Normal affect.    MSK and Neuro: -    Cervical spine  There are no signs of infection around the injection sites.     decreased active range of motion with flexion, lateral flexion, and rotation bilaterally.   There is decreased active range of motion with cervical extension.      Palpation:   Tenderness to palpation throughout the cervical spine: negative bilaterally        Cervical spine Special tests  Neuro tension  Spurling's maneuver negative bilaterally    Cervical facet loading maneuver  positive right worst at C3-4, C4-5, C5-6, negative left        Key points for the international standards for neurological classification of spinal cord injury " "(ISNCSCI) to light touch.     Dermatome R L   C4 2 2   C5 2 2   C6 2 2   C7 2 2   C8 2 2   T1 2 2   T2 2 2                                         Motor Exam Upper Extremities   ? Myotome R L   Shoulder flexion C5 5 5   Elbow flexion C5 5 5   Wrist extension C6 5 5   Elbow extension C7 5 5   Finger flexion C8 5 5   Finger abduction T1 5 5         MEDICAL DECISION MAKING    DATA    Labs:   Lab Results   Component Value Date/Time    SODIUM 138 12/14/2023 08:15 AM    POTASSIUM 4.0 12/14/2023 08:15 AM    CHLORIDE 101 12/14/2023 08:15 AM    CO2 29 12/14/2023 08:15 AM    GLUCOSE 89 12/14/2023 08:15 AM    BUN 12 12/14/2023 08:15 AM    CREATININE 0.78 12/14/2023 08:15 AM        No results found for: \"PROTHROMBTM\", \"INR\"     Lab Results   Component Value Date/Time    WBC 4.4 (L) 12/14/2023 08:15 AM    RBC 4.52 12/14/2023 08:15 AM    HEMOGLOBIN 14.0 12/14/2023 08:15 AM    HEMATOCRIT 42.6 12/14/2023 08:15 AM    MCV 94.2 12/14/2023 08:15 AM    MCH 31.0 12/14/2023 08:15 AM    MCHC 32.9 12/14/2023 08:15 AM    MPV 10.8 12/14/2023 08:15 AM    NEUTSPOLYS 49.60 12/14/2023 08:15 AM    LYMPHOCYTES 38.40 12/14/2023 08:15 AM    MONOCYTES 10.00 12/14/2023 08:15 AM    EOSINOPHILS 1.10 12/14/2023 08:15 AM    BASOPHILS 0.90 12/14/2023 08:15 AM        No results found for: \"HBA1C\"       Imaging:   I personally reviewed following images    MRI cervical spine 2/20/2023  T2 subacute compression fracture.  Multilevel degenerative changes with facet arthropathy and foraminal stenosis.    MRI left elbow 11/3/2023      I reviewed the following radiology reports                     Results for orders placed in visit on 02/24/23    MR-CERVICAL SPINE-W/O  FINDINGS:  There is subacute fracture along the anterior superior corner of T2 vertebral body. Bone marrow edema is noted along the superior portion. There is no disruption of the posterior cortex. The anterior and posterior longitudinal ligaments are intact. There   is no epidural hemorrhage. There " is no evidence of spinal cord injury.     The cervical spine maintains normal height and alignment. There is no fracture or dislocation. There is no pathologic marrow infiltration. There is no focal osseous lesion.     At the level of C2-3,there is no spinal or neural foraminal stenosis.     At the level of C3-4,there is minimal disc bulge. There is no spinal or neural foraminal stenosis.     At the level of C4-5,there is disc degeneration. There is diffuse disc bulge. Bilateral uncinate joint arthropathy is seen. There is effacement of the ventral subarachnoid space. There is severe right and mild left neural foraminal stenosis.     At the level of C5-6,there is disc degeneration. Bilateral uncinate joint arthropathy is seen. There is severe bilateral neural foraminal stenosis. There is effacement of the ventral subarachnoid space. There is flattening of the anterior spinal cord   contour. There is mild central canal stenosis.     At the level of C6-7,there is disc degeneration. There is diffuse disc bulge. Bilateral uncinate joint arthropathy is seen. There is moderate bilateral neural foraminal stenosis. There is no central canal stenosis.     At the level of C7-T1,there is no spinal or neural foraminal stenosis.     The visualized posterior fossa structures appear normal within limits.  The cervical spinal cord does not demonstrate any mass or abnormal T2 signal intensity.     There is tiny 4 mm sized left thyroid nodule.    Impression  1.  There is subacute fracture along the anterior superior corner of T2 vertebral body. Bone marrow edema is noted along the superior portion. There is no disruption of the posterior cortex. The anterior and posterior longitudinal ligaments are intact.  There is no epidural hemorrhage. This is new since the previous study. There is no evidence of spinal cord injury.  2.  Multifocal degenerative disease in the cervical spine as described above. There has been no significant  interval change.  3.  There is tiny 4 mm sized left thyroid nodule stable since the previous study.      Results for orders placed during the hospital encounter of 12/20/19    MR-LUMBAR SPINE-W/O    Impression  1.  Mild degenerative changes of the lumbar spine  2.  No areas of high-grade central canal or neural foraminal narrowing  3.  Mild LEFT neural foraminal narrowing at L4-L5  4.  Segment 1 hepatic lesion, incompletely assessed but likely a cyst or hemangioma        Results for orders placed during the hospital encounter of 12/20/19    MR-LUMBAR SPINE-W/O    Impression  1.  Mild degenerative changes of the lumbar spine  2.  No areas of high-grade central canal or neural foraminal narrowing  3.  Mild LEFT neural foraminal narrowing at L4-L5  4.  Segment 1 hepatic lesion, incompletely assessed but likely a cyst or hemangioma                                                                       Results for orders placed during the hospital encounter of 11/11/22    CT-ABDOMEN-PELVIS WITH    Impression  1.  There is no evidence of bowel obstruction or acute inflammation.  2.  There is air in the splenic flexure and descending colon and stool in the right colon but there is no colonic dilatation.  3.  No extraluminal air or free intraperitoneal air.  4.  No abnormal vascular enhancement.  5.  Gallbladder is surgically absent with probable physiologic dilatation of the intrahepatic and extra hepatic biliary tree. No obstructive mass or stone.  6.  Normal appendix.  7.  Incidental benign hepatic hemangiomas.                    Results for orders placed in visit on 02/23/23    DX-CERVICAL SPINE-2 OR 3 VIEWS    Impression  1.  Multilevel facet arthropathy and endplate spurring    2.  Minimal posterior subluxations at C4-5 and C5-6        Results for orders placed in visit on 03/30/23    DX-ELBOW-COMPLETE 3+ LEFT      Results for orders placed in visit on 07/25/22    DX-FOOT-COMPLETE 3+ RIGHT                                Results for orders placed in visit on 21    DX-WRIST-COMPLETE 3+ LEFT         DIAGNOSIS   Visit Diagnoses     ICD-10-CM   1. Cervical spondylosis  M47.812   2. Cervical spinal stenosis  M48.02   3. Chronic neck pain  M54.2    G89.29   4. DDD (degenerative disc disease), cervical  M50.30         ASSESSMENT and PLAN:     Christa Agustín Juarez MD  1965 female      Christa was seen today for follow-up.    Diagnoses and all orders for this visit:    Cervical spondylosis  -     Referral to Physical Medicine Rehab  -     Referral to Physical Medicine Rehab    Cervical spinal stenosis    Chronic neck pain    DDD (degenerative disc disease), cervical       The patient is failed conservative treatments with medication management, home exercise program from the direction of physical therapy/physician including the past 6 months.  I have ordered a RIGHT diagnostic medial branch block targeting the C3-4, C4-5, and C5-6 facet joints #1 and #2.  Procedure #2 is only to be done if #1 is a positive block.    The risks benefits and alternatives to this procedure were discussed and the patient wishes to proceed with the procedure. Risks include but are not limited to damage to surrounding structures, infection, bleeding, worsening of pain which can be permanent, weakness which can be permanent. Benefits include pain relief, improved function. Alternatives includes not doing the procedure.   If there are 2 positive blocks I would consider the patient for radiofrequency neurotomy of the previously blocked nerves.    Medications: Continue gabapentin as needed      Follow up: After the above diagnostic procedures      Thank you for allowing me to participate in the care of this patient. If you have any questions please not hesitate to contact me.             Please note that this dictation was created using voice recognition software. I have made every reasonable attempt to correct obvious errors but there may be errors of  grammar and content that I may have overlooked prior to finalization of this note.      Akira Briscoe MD  Interventional Spine and Sports Physiatry  Physical Medicine and Rehabilitation  [facility]

## 2024-08-14 NOTE — OP REPORT
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 The patient was NOT seen for cervical radiculopathy today.     Post-operative Diagnosis: Cervical spondylosis, facet arthropathy. The patient was NOT seen for cervical radiculopathy today.     Procedure:   diagnostic medial branch blocks targeting the RIGHT C3-4, C4-5, and C5-6 facet joints    Description of procedure:    The risks, benefits, and alternatives of the procedure were reviewed and discussed with the patient.  Written informed consent was freely obtained. A pre-procedural time-out was conducted by the physician verifying patient’s identity, procedure to be performed, procedure site and side, and allergy verification. Appropriate equipment was determined to be in place for the procedure.         The patient's vital signs were carefully monitored before, throughout, and after the procedure.     In the fluoroscopy suite the patient was placed in a prone position, a pillow placed underneath their chest, and the head was turned to the opposite side of injection. The skin was prepped and draped in the usual sterile fashion. The fluoroscope was placed over the cervical neck at the appropriate injection angles, and the targets for injection were marked at the cervical 3, cervical 4, cervical 5, cervical 6 levels. A 27g 1.5'' needle was placed into each of the markings levels as above, and approx 0.5mL of 1% Lidocaine was injected subcutaneously into the epidermal and dermal layers. The needle was removed. A 25g 3.5'' needle was then placed at the lateral aspect of the articular pillars, whereby the medial branch runs, at the cervical 3, cervical 4, cervical 5, cervical 6 levels on the RIGHT side.  The needle tips were then verified by AP, contralateral oblique, and lateral views.  Contrast dye  was not used because of the patient's contrast allergy. Following negative aspiration, approx 0.5mL 2% lidocaine was then injected at the above levels, and the needles were removed intact after restyleted. The patient's back was covered with a 4x4 gauze, the area was cleansed with sterile normal saline, and a dressing was applied.     There were no complications noted, and patient was hemodynamically stable before and after the procedure.     The patient had 80% pain relief postprocedure  Preprocedure pain 6/10 NRS  Postprocedure pain 1 /10 NRS      Follow-up as scheduled    Akira Briscoe MD  Interventional Spine and Pain  Physical Medicine and Rehabilitation  [facility]          CPT  Intraarticular joint or medial branch block (MBB) - cervical or thoracic (1st level):  16009-iq  Intraarticular joint or medial branch block (MBB) - cervical or thoracic (2nd level):  11275-[id number]  Intraarticular joint or medial branch block (MBB) - cervical or thoracic (3rd level):  74193-fx

## 2024-08-14 NOTE — OR SURGEON
Immediate Post OP Note    Pre-Op Diagnosis Codes:      * Cervical spondylosis [M47.812]      Post-Op Diagnosis Codes:     * Cervical spondylosis [M47.812]      Procedure(s):  Medial branch blocks targeting the RIGHT C3-4, C4-5, and C5-6 facet joints with fluoroscopic guidance #1 - Wound Class: Clean    Surgeon(s):  Akira Briscoe M.D.    Anesthesiologist/Type of Anesthesia:  No anesthesia staff entered./Local    Surgical Staff:  Circulator: Angie Bruce R.N.  Scrub Person: Clare Schwartz R.N.  Radiology Technologist: Jesus Alberto Perez    Specimens removed if any:  * No specimens in log *    Estimated Blood Loss: None    Findings: None    Complications: None        8/14/2024 8:57 AM Akira Briscoe M.D.

## 2024-08-15 ENCOUNTER — TELEPHONE (OUTPATIENT)
Dept: PHYSICAL MEDICINE AND REHAB | Facility: MEDICAL CENTER | Age: 59
End: 2024-08-15
Payer: COMMERCIAL

## 2024-08-15 NOTE — TELEPHONE ENCOUNTER
Called for Post -SP check up: Medial branch blocks targeting the RIGHT C3-4, C4-5, and C5-6 facet joints with fluoroscopic guidance #1     Change in pain: yes    Concerns? no    Confirmed [facility] appt? yes

## 2024-08-21 ENCOUNTER — HOSPITAL ENCOUNTER (OUTPATIENT)
Facility: REHABILITATION | Age: 59
End: 2024-08-21
Attending: PHYSICAL MEDICINE & REHABILITATION | Admitting: PHYSICAL MEDICINE & REHABILITATION
Payer: COMMERCIAL

## 2024-08-21 ENCOUNTER — APPOINTMENT (OUTPATIENT)
Dept: RADIOLOGY | Facility: REHABILITATION | Age: 59
End: 2024-08-21
Attending: PHYSICAL MEDICINE & REHABILITATION
Payer: COMMERCIAL

## 2024-08-21 VITALS
OXYGEN SATURATION: 94 % | TEMPERATURE: 97.2 F | HEART RATE: 66 BPM | RESPIRATION RATE: 16 BRPM | HEIGHT: 65 IN | SYSTOLIC BLOOD PRESSURE: 133 MMHG | DIASTOLIC BLOOD PRESSURE: 81 MMHG | WEIGHT: 126.54 LBS | BODY MASS INDEX: 21.08 KG/M2

## 2024-08-21 PROCEDURE — 700101 HCHG RX REV CODE 250

## 2024-08-21 PROCEDURE — 64491 INJ PARAVERT F JNT C/T 2 LEV: CPT

## 2024-08-21 PROCEDURE — 64490 INJ PARAVERT F JNT C/T 1 LEV: CPT

## 2024-08-21 PROCEDURE — 64492 INJ PARAVERT F JNT C/T 3 LEV: CPT

## 2024-08-21 PROCEDURE — 700111 HCHG RX REV CODE 636 W/ 250 OVERRIDE (IP): Mod: JZ

## 2024-08-21 RX ORDER — LIDOCAINE HYDROCHLORIDE 20 MG/ML
INJECTION, SOLUTION EPIDURAL; INFILTRATION; INTRACAUDAL; PERINEURAL
Status: COMPLETED
Start: 2024-08-21 | End: 2024-08-21

## 2024-08-21 RX ORDER — LIDOCAINE HYDROCHLORIDE 10 MG/ML
INJECTION, SOLUTION EPIDURAL; INFILTRATION; INTRACAUDAL; PERINEURAL
Status: COMPLETED
Start: 2024-08-21 | End: 2024-08-21

## 2024-08-21 RX ADMIN — LIDOCAINE HYDROCHLORIDE 10 ML: 10 INJECTION, SOLUTION EPIDURAL; INFILTRATION; INTRACAUDAL; PERINEURAL at 10:46

## 2024-08-21 RX ADMIN — LIDOCAINE HYDROCHLORIDE 10 ML: 20 INJECTION, SOLUTION EPIDURAL; INFILTRATION; INTRACAUDAL at 10:46

## 2024-08-21 ASSESSMENT — PAIN DESCRIPTION - PAIN TYPE
TYPE: CHRONIC PAIN

## 2024-08-21 ASSESSMENT — FIBROSIS 4 INDEX: FIB4 SCORE: 0.84

## 2024-08-21 NOTE — OR SURGEON
Immediate Post OP Note    Pre-Op Diagnosis Codes:      * Cervical spondylosis [M47.812]      Post-Op Diagnosis Codes:     * Cervical spondylosis [M47.812]      Procedure(s):  Medial branch blocks targeting the RIGHT C3-4, C4-5, and C5-6 facet joints with fluoroscopic guidance #2 - Wound Class: Clean    Surgeon(s):  Akira Briscoe M.D.    Anesthesiologist/Type of Anesthesia:  No anesthesia staff entered./Local    Surgical Staff:  Circulator: Eve Casarez R.N.  Scrub Person: Cheyenne Weiner  Radiology Technologist: Carmen Jamison    Specimens removed if any:  * No specimens in log *    Estimated Blood Loss: None    Findings: None    Complications: None        8/21/2024 10:52 AM Akira Briscoe M.D.

## 2024-08-21 NOTE — OP REPORT
Date of Service: see epic time stamp for DOS    Patient: Christa Juarez MD 59 y.o. female     MRN: [id number]     Physician/s: Akira Briscoe MD    Pre-operative Diagnosis: Cervical spondylosis, facet arthropathy. The patient was NOT seen for cervical radiculopathy today.     Post-operative Diagnosis: Cervical spondylosis, facet arthropathy. The patient was NOT seen for cervical radiculopathy today.     Procedure:   diagnostic medial branch blocks targeting the RIGHT C3-4, C4-5, and C5-6 facet joints    Description of procedure:    The risks, benefits, and alternatives of the procedure were reviewed and discussed with the patient.  Written informed consent was freely obtained. A pre-procedural time-out was conducted by the physician verifying patient’s identity, procedure to be performed, procedure site and side, and allergy verification. Appropriate equipment was determined to be in place for the procedure.         The patient's vital signs were carefully monitored before, throughout, and after the procedure.     In the fluoroscopy suite the patient was placed in a prone position, a pillow placed underneath their chest, and the head was turned to the opposite side of injection. The skin was prepped and draped in the usual sterile fashion. The fluoroscope was placed over the cervical neck at the appropriate injection angles, and the targets for injection were marked at the cervical 3, cervical 4, cervical 5, cervical 6 levels. A 27g 1.5'' needle was placed into each of the markings levels as above, and approx 0.5mL of 1% Lidocaine was injected subcutaneously into the epidermal and dermal layers. The needle was removed. A 25g 3.5'' needle was then placed at the lateral aspect of the articular pillars, whereby the medial branch runs, at the cervical 3, cervical 4, cervical 5, cervical 6 levels on the RIGHT side.  The needle tips were then verified by AP, contralateral oblique, and lateral views.  Contrast dye  was not used because of the patient's contrast allergy. Following negative aspiration, approx 0.5mL 2% lidocaine was then injected at the above levels, and the needles were removed intact after restyleted. The patient's back was covered with a 4x4 gauze, the area was cleansed with sterile normal saline, and a dressing was applied.     There were no complications noted, and patient was hemodynamically stable before and after the procedure.     The patient had greater than 90 percent pain relief postprocedure  Preprocedure pain 6/10 NRS  Postprocedure pain 1 /10 NRS       Follow-up as scheduled    Akira Briscoe MD  Interventional Spine and Pain  Physical Medicine and Rehabilitation  [facility]          CPT  Intraarticular joint or medial branch block (MBB) - cervical or thoracic (1st level):  [id number]  Intraarticular joint or medial branch block (MBB) - cervical or thoracic (2nd level):  95963-[id number]  Intraarticular joint or medial branch block (MBB) - cervical or thoracic (3rd level):  [id number]

## 2024-08-21 NOTE — INTERVAL H&P NOTE
H&P reviewed. The patient was examined and there are no changes to the H&P     The patient had 100% pain relief after the diagnostic medial branch blocks targeting the same levels during the diagnostic phase.  Preprocedure pain 6/10 NRS postprocedure pain 1/10 NRS.  The pain returned back to baseline as expected after the diagnostic phase.  This is a positive diagnostic block     Lungs clear to auscultation bilaterally.  No abdominal tenderness.  Heart regular rate and rhythm.  Vitals reviewed.    Proceed with the originally scheduled procedure.  The risks, benefits and alternatives were discussed.  The patient understands these.    Pre-Op Diagnosis Codes:      * Cervical spondylosis [M47.812]  Procedure(s):  Medial branch blocks targeting the RIGHT C3-4, C4-5, and C5-6 facet joints with fluoroscopic guidance #2    Akira Briscoe MD  Physical Medicine and Rehabilitation  Interventional Spine and Sports Physiatry  [facility]

## 2024-08-22 ENCOUNTER — TELEPHONE (OUTPATIENT)
Dept: PHYSICAL MEDICINE AND REHAB | Facility: MEDICAL CENTER | Age: 59
End: 2024-08-22
Payer: COMMERCIAL

## 2024-08-28 ENCOUNTER — HOSPITAL ENCOUNTER (OUTPATIENT)
Facility: REHABILITATION | Age: 59
End: 2024-08-28
Attending: PHYSICAL MEDICINE & REHABILITATION | Admitting: PHYSICAL MEDICINE & REHABILITATION
Payer: COMMERCIAL

## 2024-08-28 ENCOUNTER — OFFICE VISIT (OUTPATIENT)
Dept: PHYSICAL MEDICINE AND REHAB | Facility: MEDICAL CENTER | Age: 59
End: 2024-08-28
Payer: COMMERCIAL

## 2024-08-28 VITALS
DIASTOLIC BLOOD PRESSURE: 76 MMHG | BODY MASS INDEX: 20.66 KG/M2 | WEIGHT: 124 LBS | HEART RATE: 69 BPM | HEIGHT: 65 IN | TEMPERATURE: 98.7 F | OXYGEN SATURATION: 98 % | SYSTOLIC BLOOD PRESSURE: 136 MMHG

## 2024-08-28 DIAGNOSIS — M48.02 CERVICAL SPINAL STENOSIS: ICD-10-CM

## 2024-08-28 DIAGNOSIS — M50.30 DDD (DEGENERATIVE DISC DISEASE), CERVICAL: ICD-10-CM

## 2024-08-28 DIAGNOSIS — M47.812 CERVICAL SPONDYLOSIS: ICD-10-CM

## 2024-08-28 DIAGNOSIS — M54.12 CERVICAL RADICULOPATHY: ICD-10-CM

## 2024-08-28 DIAGNOSIS — G89.29 CHRONIC NECK PAIN: ICD-10-CM

## 2024-08-28 DIAGNOSIS — M54.2 CHRONIC NECK PAIN: ICD-10-CM

## 2024-08-28 ASSESSMENT — FIBROSIS 4 INDEX: FIB4 SCORE: 0.84

## 2024-08-28 ASSESSMENT — PAIN SCALES - GENERAL: PAINLEVEL: 3=SLIGHT PAIN

## 2024-08-28 ASSESSMENT — PATIENT HEALTH QUESTIONNAIRE - PHQ9: CLINICAL INTERPRETATION OF PHQ2 SCORE: 0

## 2024-08-28 NOTE — PROGRESS NOTES
Follow up patient note  Interventional spine and Pain  Physiatry (physical medicine and  Rehabilitation)       Chief complaint:   Chief Complaint   Patient presents with    Follow-Up     Neck pain          HISTORY    Please see new patient note by Dr Briscoe,  for more details.     HPI  Patient identification: Christa Juarez MD ,  1965,   With Diagnoses of Cervical spondylosis, Cervical spinal stenosis, Chronic neck pain, DDD (degenerative disc disease), cervical, and Cervical radiculopathy, stable were pertinent to this visit.     Procedures:  2024 medial branch blocks targeting the right C3-4, C4-5, C5-6 facet joints with fluoroscopic guidance. The patient had 100% pain relief after the diagnostic medial branch blocks targeting the same levels during the diagnostic phase.  Preprocedure pain 6/10 NRS postprocedure pain 1/10 NRS.   2024 medial branch blocks targeting the right C3-4, C4-5, C5-6 facet joints with fluoroscopic guidance. The patient had 100% pain relief after the diagnostic medial branch blocks targeting the same levels during the diagnostic phase.  Preprocedure pain 6/10 NRS postprocedure pain 1/10 NRS.     She has chronic mid to lower cervical neck pain and periscapular pain which is been present for greater than 6 months overall.  Axial neck pain and nonradiating.  She has tried medication management including with gabapentin and meloxicam.    History of Present Illness  The patient is a 59-year-old female here for follow-up.    Following the nerve blocks, she experienced a from pain during the diagnostic phase for approximately 3 hours. She has been managing her pain with Neurontin and meloxicam, although she expresses a dislike for the latter due to the need to take it concurrently with Pepcid.     She has been engaging in weightlifting exercises, currently lifting 3 pounds, but believes she could potentially lift 4 or 5 pounds. As a left-handed individual, she experiences  constant pain in her right neck, which intensifies when she uses her backhand during tennis. She has since stopped playing tennis.  She continues to play pickle ball    She also mentions that she is legally blind and has cataracts.      Elbow pain is stable      ROS Red Flags :   Fever, Chills, Sweats: Denies  Involuntary Weight Loss: Denies  Bowel/Bladder Incontinence: Denies  Saddle Anesthesia: Denies        PMHx:   Past Medical History:   Diagnosis Date    Anesthesia     Nausea in recovery    Arthritis     High cholesterol        PSHx:   Past Surgical History:   Procedure Laterality Date    INJ,ANES LUMBAR/CERVICAL Right 2024    Procedure: Medial branch blocks targeting the RIGHT C3-4, C4-5, and C5-6 facet joints with fluoroscopic guidance #2;  Surgeon: Akira Briscoe M.D.;  [facility];  Service: Pain Management    INJ,ANES LUMBAR/CERVICAL Right 2024    Procedure: Medial branch blocks targeting the RIGHT C3-4, C4-5, and C5-6 facet joints with fluoroscopic guidance #1;  Surgeon: Akira Briscoe M.D.;  Location: [facility];  Service: Pain Management    BONE BIOPSY Left 2016    Procedure: WRIST BIOPSY WITH POSSIBLE BONE GRAFT ALLO ;  Surgeon: Benjamin Menezes M.D.;  Location: SURGERY [facility];  Service:     BLEPHAROPLASTY  2008    CHOLECYSTECTOMY      PRIMARY C SECTION  1998           Family history   Family History   Problem Relation Age of Onset    Cancer Other     Cancer Paternal Aunt     Arthritis Mother     Hyperlipidemia Mother     Arthritis Brother     Cancer Brother         Prostate         Medications:   Outpatient Medications Marked as Taking for the 24 encounter (Office Visit) with Akira Briscoe M.D.   Medication Sig Dispense Refill    propranolol (INDERAL) 20 MG Tab TAKE 1 TABLET BY MOUTH 30 MIN BEFORE THE EVENT FOR ANTICIPATORY ANXIETY. 90 Tablet 2    gabapentin (NEURONTIN) 100 MG Cap Take 1-3 Capsules by mouth  at bedtime as needed (pain). 90 Capsule 11    meloxicam (MOBIC) 15 MG tablet Take 1 Tablet by mouth 1 time a day as needed (pain). Do not take other NSAIDs. No refills. 60 Tablet 0    sumatriptan (IMITREX) 100 MG tablet TAKE 1 TABLET BY MOUTH EVERY DAY AS NEEDED FOR HEAD ACHE 10 Tablet 12    sumatriptan (IMITREX) 100 MG tablet Take 1 Tablet by mouth one time as needed for Migraine for up to 1 dose. 10 Tablet 3    methocarbamol (ROBAXIN) 500 MG Tab Take 1 Tablet by mouth 3 times a day as needed (neck pain). 30 Tablet 1    Calcium-Magnesium-Vitamin D (CALCIUM MAGNESIUM PO) Take  by mouth every day.          Current Outpatient Medications on File Prior to Visit   Medication Sig Dispense Refill    propranolol (INDERAL) 20 MG Tab TAKE 1 TABLET BY MOUTH 30 MIN BEFORE THE EVENT FOR ANTICIPATORY ANXIETY. 90 Tablet 2    gabapentin (NEURONTIN) 100 MG Cap Take 1-3 Capsules by mouth at bedtime as needed (pain). 90 Capsule 11    meloxicam (MOBIC) 15 MG tablet Take 1 Tablet by mouth 1 time a day as needed (pain). Do not take other NSAIDs. No refills. 60 Tablet 0    sumatriptan (IMITREX) 100 MG tablet TAKE 1 TABLET BY MOUTH EVERY DAY AS NEEDED FOR HEAD ACHE 10 Tablet 12    sumatriptan (IMITREX) 100 MG tablet Take 1 Tablet by mouth one time as needed for Migraine for up to 1 dose. 10 Tablet 3    methocarbamol (ROBAXIN) 500 MG Tab Take 1 Tablet by mouth 3 times a day as needed (neck pain). 30 Tablet 1    Calcium-Magnesium-Vitamin D (CALCIUM MAGNESIUM PO) Take  by mouth every day.      ondansetron (ZOFRAN) 4 MG Tab tablet TAKE 1 TABLET BY MOUTH TWICE A DAY AS NEEDED FOR NAUSEA AND VOMITING      COVID-19mRNA Bival Vacc Pfizer (PFIZER COVID-19 BIVALENT) 30 MCG/0.3ML Suspension injection Inject  into the shoulder, thigh, or buttocks. (Patient not taking: Reported on 4/24/2023) 0.3 mL 0     No current facility-administered medications on file prior to visit.         Allergies:   Allergies   Allergen Reactions    Penicillins Hives     Diagnostic X-Ray Materials Hives, Itching and Rash    Zanaflex [Tizanidine]      headache       Social Hx:   Social History     Socioeconomic History    Marital status:      Spouse name: Not on file    Number of children: Not on file    Years of education: Not on file    Highest education level: Professional school degree (e.g., MD, DDS, DVM, AMISHA)   Occupational History    Not on file   Tobacco Use    Smoking status: Never    Smokeless tobacco: Never   Vaping Use    Vaping status: Never Used   Substance and Sexual Activity    Alcohol use: Yes     Comment: Occasional    Drug use: No    Sexual activity: Yes     Comment: Radiation Oncologist   Other Topics Concern    Not on file   Social History Narrative    Not on file     Social Determinants of Health     Financial Resource Strain: Low Risk  (8/2/2020)    Overall Financial Resource Strain (CARDIA)     Difficulty of Paying Living Expenses: Not hard at all   Food Insecurity: No Food Insecurity (8/2/2020)    Hunger Vital Sign     Worried About Running Out of Food in the Last Year: Never true     Ran Out of Food in the Last Year: Never true   Transportation Needs: No Transportation Needs (8/2/2020)    PRAPARE - Transportation     Lack of Transportation (Medical): No     Lack of Transportation (Non-Medical): No   Physical Activity: Sufficiently Active (8/2/2020)    Exercise Vital Sign     Days of Exercise per Week: 4 days     Minutes of Exercise per Session: 50 min   Stress: No Stress Concern Present (8/2/2020)    [employer] - Occupational Stress Questionnaire     Feeling of Stress : Not at all   Social Connections: Moderately Isolated (8/2/2020)    Social Connection and Isolation Panel [NHANES]     Frequency of Communication with Friends and Family: More than three times a week     Frequency of Social Gatherings with Friends and Family: Three times a week     Attends Hinduism Services: Never     Active Member of Clubs or  "Organizations: No     Attends Club or Organization Meetings: Never     Marital Status:    Intimate Partner Violence: Not on file   Housing Stability: Low Risk  (8/2/2020)    Housing Stability Vital Sign     Unable to Pay for Housing in the Last Year: No     Number of Places Lived in the Last Year: 1     Unstable Housing in the Last Year: No         EXAMINATION     Physical Exam:   Vitals: /76 (BP Location: Right arm, Patient Position: Sitting, BP Cuff Size: Adult)   Pulse 69   Temp 37.1 °C (98.7 °F) (Temporal)   Ht 1.651 m (5' 5\")   Wt 56.2 kg (124 lb)   SpO2 98%     Constitutional:   Body Habitus: Body mass index is 20.63 kg/m².  Cooperation: Fully cooperates with exam  Appearance: Well-groomed no disheveled    Respiratory-  breathing comfortable on room air, no audible wheezing  Cardiovascular- capillary refills less than 2 seconds. No lower extremity edema is noted.   Psychiatric- alert and oriented ×3. Normal affect.    MSK and Neuro: -  Exam done 8/28/2024   Cervical spine  There are no signs of infection around the injection sites.     decreased active range of motion with flexion, lateral flexion, and rotation bilaterally.   There is decreased active range of motion with cervical extension.      Palpation:   Tenderness to palpation throughout the cervical spine: negative bilaterally        Cervical spine Special tests  Neuro tension  Spurling's maneuver negative bilaterally    Cervical facet loading maneuver  positive right worst at C3-4, C4-5, C5-6, negative left        Key points for the international standards for neurological classification of spinal cord injury (ISNCSCI) to light touch.     Dermatome R L   C4 2 2   C5 2 2   C6 2 2   C7 2 2   C8 2 2   T1 2 2   T2 2 2                                         Motor Exam Upper Extremities   ? Myotome R L   Shoulder flexion C5 5 5   Elbow flexion C5 5 5   Wrist extension C6 5 5   Elbow extension C7 5 5   Finger flexion C8 5 5   Finger " "abduction T1 5 5         MEDICAL DECISION MAKING    DATA    Labs:   Lab Results   Component Value Date/Time    SODIUM 138 12/14/2023 08:15 AM    POTASSIUM 4.0 12/14/2023 08:15 AM    CHLORIDE 101 12/14/2023 08:15 AM    CO2 29 12/14/2023 08:15 AM    GLUCOSE 89 12/14/2023 08:15 AM    BUN 12 12/14/2023 08:15 AM    CREATININE 0.78 12/14/2023 08:15 AM        No results found for: \"PROTHROMBTM\", \"INR\"     Lab Results   Component Value Date/Time    WBC 4.4 (L) 12/14/2023 08:15 AM    RBC 4.52 12/14/2023 08:15 AM    HEMOGLOBIN 14.0 12/14/2023 08:15 AM    HEMATOCRIT 42.6 12/14/2023 08:15 AM    MCV 94.2 12/14/2023 08:15 AM    MCH 31.0 12/14/2023 08:15 AM    MCHC 32.9 12/14/2023 08:15 AM    MPV 10.8 12/14/2023 08:15 AM    NEUTSPOLYS 49.60 12/14/2023 08:15 AM    LYMPHOCYTES 38.40 12/14/2023 08:15 AM    MONOCYTES 10.00 12/14/2023 08:15 AM    EOSINOPHILS 1.10 12/14/2023 08:15 AM    BASOPHILS 0.90 12/14/2023 08:15 AM        No results found for: \"HBA1C\"       Imaging:   I personally reviewed following images    MRI cervical spine 2/20/2023  T2 subacute compression fracture.  Multilevel degenerative changes with facet arthropathy and foraminal stenosis.    MRI left elbow 11/3/2023      I reviewed the following radiology reports                     Results for orders placed in visit on 02/24/23    MR-CERVICAL SPINE-W/O  FINDINGS:  There is subacute fracture along the anterior superior corner of T2 vertebral body. Bone marrow edema is noted along the superior portion. There is no disruption of the posterior cortex. The anterior and posterior longitudinal ligaments are intact. There   is no epidural hemorrhage. There is no evidence of spinal cord injury.     The cervical spine maintains normal height and alignment. There is no fracture or dislocation. There is no pathologic marrow infiltration. There is no focal osseous lesion.     At the level of C2-3,there is no spinal or neural foraminal stenosis.     At the level of C3-4,there is " minimal disc bulge. There is no spinal or neural foraminal stenosis.     At the level of C4-5,there is disc degeneration. There is diffuse disc bulge. Bilateral uncinate joint arthropathy is seen. There is effacement of the ventral subarachnoid space. There is severe right and mild left neural foraminal stenosis.     At the level of C5-6,there is disc degeneration. Bilateral uncinate joint arthropathy is seen. There is severe bilateral neural foraminal stenosis. There is effacement of the ventral subarachnoid space. There is flattening of the anterior spinal cord   contour. There is mild central canal stenosis.     At the level of C6-7,there is disc degeneration. There is diffuse disc bulge. Bilateral uncinate joint arthropathy is seen. There is moderate bilateral neural foraminal stenosis. There is no central canal stenosis.     At the level of C7-T1,there is no spinal or neural foraminal stenosis.     The visualized posterior fossa structures appear normal within limits.  The cervical spinal cord does not demonstrate any mass or abnormal T2 signal intensity.     There is tiny 4 mm sized left thyroid nodule.    Impression  1.  There is subacute fracture along the anterior superior corner of T2 vertebral body. Bone marrow edema is noted along the superior portion. There is no disruption of the posterior cortex. The anterior and posterior longitudinal ligaments are intact.  There is no epidural hemorrhage. This is new since the previous study. There is no evidence of spinal cord injury.  2.  Multifocal degenerative disease in the cervical spine as described above. There has been no significant interval change.  3.  There is tiny 4 mm sized left thyroid nodule stable since the previous study.      Results for orders placed during the hospital encounter of 12/20/19    MR-LUMBAR SPINE-W/O    Impression  1.  Mild degenerative changes of the lumbar spine  2.  No areas of high-grade central canal or neural foraminal  narrowing  3.  Mild LEFT neural foraminal narrowing at L4-L5  4.  Segment 1 hepatic lesion, incompletely assessed but likely a cyst or hemangioma        Results for orders placed during the hospital encounter of 12/20/19    MR-LUMBAR SPINE-W/O    Impression  1.  Mild degenerative changes of the lumbar spine  2.  No areas of high-grade central canal or neural foraminal narrowing  3.  Mild LEFT neural foraminal narrowing at L4-L5  4.  Segment 1 hepatic lesion, incompletely assessed but likely a cyst or hemangioma                                                                       Results for orders placed during the hospital encounter of 11/11/22    CT-ABDOMEN-PELVIS WITH    Impression  1.  There is no evidence of bowel obstruction or acute inflammation.  2.  There is air in the splenic flexure and descending colon and stool in the right colon but there is no colonic dilatation.  3.  No extraluminal air or free intraperitoneal air.  4.  No abnormal vascular enhancement.  5.  Gallbladder is surgically absent with probable physiologic dilatation of the intrahepatic and extra hepatic biliary tree. No obstructive mass or stone.  6.  Normal appendix.  7.  Incidental benign hepatic hemangiomas.                    Results for orders placed in visit on 02/23/23    DX-CERVICAL SPINE-2 OR 3 VIEWS    Impression  1.  Multilevel facet arthropathy and endplate spurring    2.  Minimal posterior subluxations at C4-5 and C5-6        Results for orders placed in visit on 03/30/23    DX-ELBOW-COMPLETE 3+ LEFT      Results for orders placed in visit on 07/25/22    DX-FOOT-COMPLETE 3+ RIGHT                               Results for orders placed in visit on 11/19/21    DX-WRIST-COMPLETE 3+ LEFT         DIAGNOSIS   Visit Diagnoses     ICD-10-CM   1. Cervical spondylosis  M47.812   2. Cervical spinal stenosis  M48.02   3. Chronic neck pain  M54.2    G89.29   4. DDD (degenerative disc disease), cervical  M50.30   5. 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    Diagnoses and all orders for this visit:    Cervical spondylosis  -     Referral to Physical Medicine Rehab    Cervical spinal stenosis    Chronic neck pain    DDD (degenerative disc disease), cervical    Cervical radiculopathy, stable        Status post diagnostic medial branch blocks x2 targeting the RIGHT C3-4, C4-5, and C5-6 facet joints with greater than 80% pain relief during the diagnostic phase.  This represents a positive diagnostic block.    I believe the patient is a good candidate for RIGHT radiofrequency neurotomy targeting the medial branches innervating the C3-4, C4-5, and C5-6 facet joints    The risks benefits and alternatives to this procedure were discussed and the patient wishes to proceed with the procedure. Risks include but are not limited to damage to surrounding structures, infection, bleeding, worsening of pain which can be permanent, weakness which can be permanent. Benefits include pain relief, improved function. Alternatives includes not doing the procedure.           Medications: Continue gabapentin and meloxicam    Follow up: After the above procedures      Thank you for allowing me to participate in the care of this patient. If you have any questions please not hesitate to contact me.             Please note that this dictation was created using voice recognition software. I have made every reasonable attempt to correct obvious errors but there may be errors of grammar and content that I may have overlooked prior to finalization of this note.      Akira Briscoe MD  Interventional Spine and Sports Physiatry  Physical Medicine and Rehabilitation  [facility]

## 2024-08-28 NOTE — H&P (VIEW-ONLY)
Follow up patient note  Interventional spine and Pain  Physiatry (physical medicine and  Rehabilitation)       Chief complaint:   Chief Complaint   Patient presents with    Follow-Up     Neck pain          HISTORY    Please see new patient note by Dr Briscoe,  for more details.     HPI  Patient identification: Christa Juarez MD ,  1965,   With Diagnoses of Cervical spondylosis, Cervical spinal stenosis, Chronic neck pain, DDD (degenerative disc disease), cervical, and Cervical radiculopathy, stable were pertinent to this visit.     Procedures:  2024 medial branch blocks targeting the right C3-4, C4-5, C5-6 facet joints with fluoroscopic guidance. The patient had 100% pain relief after the diagnostic medial branch blocks targeting the same levels during the diagnostic phase.  Preprocedure pain 6/10 NRS postprocedure pain 1/10 NRS.   2024 medial branch blocks targeting the right C3-4, C4-5, C5-6 facet joints with fluoroscopic guidance. The patient had 100% pain relief after the diagnostic medial branch blocks targeting the same levels during the diagnostic phase.  Preprocedure pain 6/10 NRS postprocedure pain 1/10 NRS.     She has chronic mid to lower cervical neck pain and periscapular pain which is been present for greater than 6 months overall.  Axial neck pain and nonradiating.  She has tried medication management including with gabapentin and meloxicam.    History of Present Illness  The patient is a 59-year-old female here for follow-up.    Following the nerve blocks, she experienced a from pain during the diagnostic phase for approximately 3 hours. She has been managing her pain with Neurontin and meloxicam, although she expresses a dislike for the latter due to the need to take it concurrently with Pepcid.     She has been engaging in weightlifting exercises, currently lifting 3 pounds, but believes she could potentially lift 4 or 5 pounds. As a left-handed individual, she experiences  constant pain in her right neck, which intensifies when she uses her backhand during tennis. She has since stopped playing tennis.  She continues to play pickle ball    She also mentions that she is legally blind and has cataracts.      Elbow pain is stable      ROS Red Flags :   Fever, Chills, Sweats: Denies  Involuntary Weight Loss: Denies  Bowel/Bladder Incontinence: Denies  Saddle Anesthesia: Denies        PMHx:   Past Medical History:   Diagnosis Date    Anesthesia     Nausea in recovery    Arthritis     High cholesterol        PSHx:   Past Surgical History:   Procedure Laterality Date    INJ,ANES LUMBAR/CERVICAL Right 2024    Procedure: Medial branch blocks targeting the RIGHT C3-4, C4-5, and C5-6 facet joints with fluoroscopic guidance #2;  Surgeon: Akira Briscoe M.D.;  [facility];  Service: Pain Management    INJ,ANES LUMBAR/CERVICAL Right 2024    Procedure: Medial branch blocks targeting the RIGHT C3-4, C4-5, and C5-6 facet joints with fluoroscopic guidance #1;  Surgeon: Akira Briscoe M.D.;  Location: [facility];  Service: Pain Management    BONE BIOPSY Left 2016    Procedure: WRIST BIOPSY WITH POSSIBLE BONE GRAFT ALLO ;  Surgeon: Benjamin Menezes M.D.;  Location: [facility];  Service:     BLEPHAROPLASTY  2008    CHOLECYSTECTOMY      PRIMARY C SECTION  1998           Family history   Family History   Problem Relation Age of Onset    Cancer Other     Cancer Paternal Aunt     Arthritis Mother     Hyperlipidemia Mother     Arthritis Brother     Cancer Brother         Prostate         Medications:   Outpatient Medications Marked as Taking for the 24 encounter (Office Visit) with Akira Briscoe M.D.   Medication Sig Dispense Refill    propranolol (INDERAL) 20 MG Tab TAKE 1 TABLET BY MOUTH 30 MIN BEFORE THE EVENT FOR ANTICIPATORY ANXIETY. 90 Tablet 2    gabapentin (NEURONTIN) 100 MG Cap Take 1-3 Capsules by mouth  at bedtime as needed (pain). 90 Capsule 11    meloxicam (MOBIC) 15 MG tablet Take 1 Tablet by mouth 1 time a day as needed (pain). Do not take other NSAIDs. No refills. 60 Tablet 0    sumatriptan (IMITREX) 100 MG tablet TAKE 1 TABLET BY MOUTH EVERY DAY AS NEEDED FOR HEAD ACHE 10 Tablet 12    sumatriptan (IMITREX) 100 MG tablet Take 1 Tablet by mouth one time as needed for Migraine for up to 1 dose. 10 Tablet 3    methocarbamol (ROBAXIN) 500 MG Tab Take 1 Tablet by mouth 3 times a day as needed (neck pain). 30 Tablet 1    Calcium-Magnesium-Vitamin D (CALCIUM MAGNESIUM PO) Take  by mouth every day.          Current Outpatient Medications on File Prior to Visit   Medication Sig Dispense Refill    propranolol (INDERAL) 20 MG Tab TAKE 1 TABLET BY MOUTH 30 MIN BEFORE THE EVENT FOR ANTICIPATORY ANXIETY. 90 Tablet 2    gabapentin (NEURONTIN) 100 MG Cap Take 1-3 Capsules by mouth at bedtime as needed (pain). 90 Capsule 11    meloxicam (MOBIC) 15 MG tablet Take 1 Tablet by mouth 1 time a day as needed (pain). Do not take other NSAIDs. No refills. 60 Tablet 0    sumatriptan (IMITREX) 100 MG tablet TAKE 1 TABLET BY MOUTH EVERY DAY AS NEEDED FOR HEAD ACHE 10 Tablet 12    sumatriptan (IMITREX) 100 MG tablet Take 1 Tablet by mouth one time as needed for Migraine for up to 1 dose. 10 Tablet 3    methocarbamol (ROBAXIN) 500 MG Tab Take 1 Tablet by mouth 3 times a day as needed (neck pain). 30 Tablet 1    Calcium-Magnesium-Vitamin D (CALCIUM MAGNESIUM PO) Take  by mouth every day.      ondansetron (ZOFRAN) 4 MG Tab tablet TAKE 1 TABLET BY MOUTH TWICE A DAY AS NEEDED FOR NAUSEA AND VOMITING      COVID-19mRNA Bival Vacc Pfizer (PFIZER COVID-19 BIVALENT) 30 MCG/0.3ML Suspension injection Inject  into the shoulder, thigh, or buttocks. (Patient not taking: Reported on 4/24/2023) 0.3 mL 0     No current facility-administered medications on file prior to visit.         Allergies:   Allergies   Allergen Reactions    Penicillins Hives     Diagnostic X-Ray Materials Hives, Itching and Rash    Zanaflex [Tizanidine]      headache       Social Hx:   Social History     Socioeconomic History    Marital status:      Spouse name: Not on file    Number of children: Not on file    Years of education: Not on file    Highest education level: Professional school degree (e.g., MD, DDS, DVM, AMISHA)   Occupational History    Not on file   Tobacco Use    Smoking status: Never    Smokeless tobacco: Never   Vaping Use    Vaping status: Never Used   Substance and Sexual Activity    Alcohol use: Yes     Comment: Occasional    Drug use: No    Sexual activity: Yes     Comment: Radiation Oncologist   Other Topics Concern    Not on file   Social History Narrative    Not on file     Social Determinants of Health     Financial Resource Strain: Low Risk  (8/2/2020)    Overall Financial Resource Strain (CARDIA)     Difficulty of Paying Living Expenses: Not hard at all   Food Insecurity: No Food Insecurity (8/2/2020)    Hunger Vital Sign     Worried About Running Out of Food in the Last Year: Never true     Ran Out of Food in the Last Year: Never true   Transportation Needs: No Transportation Needs (8/2/2020)    PRAPARE - Transportation     Lack of Transportation (Medical): No     Lack of Transportation (Non-Medical): No   Physical Activity: Sufficiently Active (8/2/2020)    Exercise Vital Sign     Days of Exercise per Week: 4 days     Minutes of Exercise per Session: 50 min   Stress: No Stress Concern Present (8/2/2020)    [place] [employer] - Occupational Stress Questionnaire     Feeling of Stress : Not at all   Social Connections: Moderately Isolated (8/2/2020)    Social Connection and Isolation Panel [NHANES]     Frequency of Communication with Friends and Family: More than three times a week     Frequency of Social Gatherings with Friends and Family: Three times a week     Attends Zoroastrianism Services: Never     Active Member of Clubs or  "Organizations: No     Attends Club or Organization Meetings: Never     Marital Status:    Intimate Partner Violence: Not on file   Housing Stability: Low Risk  (8/2/2020)    Housing Stability Vital Sign     Unable to Pay for Housing in the Last Year: No     Number of Places Lived in the Last Year: 1     Unstable Housing in the Last Year: No         EXAMINATION     Physical Exam:   Vitals: /76 (BP Location: Right arm, Patient Position: Sitting, BP Cuff Size: Adult)   Pulse 69   Temp 37.1 °C (98.7 °F) (Temporal)   Ht 1.651 m (5' 5\")   Wt 56.2 kg (124 lb)   SpO2 98%     Constitutional:   Body Habitus: Body mass index is 20.63 kg/m².  Cooperation: Fully cooperates with exam  Appearance: Well-groomed no disheveled    Respiratory-  breathing comfortable on room air, no audible wheezing  Cardiovascular- capillary refills less than 2 seconds. No lower extremity edema is noted.   Psychiatric- alert and oriented ×3. Normal affect.    MSK and Neuro: -  Exam done 8/28/2024   Cervical spine  There are no signs of infection around the injection sites.     decreased active range of motion with flexion, lateral flexion, and rotation bilaterally.   There is decreased active range of motion with cervical extension.      Palpation:   Tenderness to palpation throughout the cervical spine: negative bilaterally        Cervical spine Special tests  Neuro tension  Spurling's maneuver negative bilaterally    Cervical facet loading maneuver  positive right worst at C3-4, C4-5, C5-6, negative left        Key points for the international standards for neurological classification of spinal cord injury (ISNCSCI) to light touch.     Dermatome R L   C4 2 2   C5 2 2   C6 2 2   C7 2 2   C8 2 2   T1 2 2   T2 2 2                                         Motor Exam Upper Extremities   ? Myotome R L   Shoulder flexion C5 5 5   Elbow flexion C5 5 5   Wrist extension C6 5 5   Elbow extension C7 5 5   Finger flexion C8 5 5   Finger " "abduction T1 5 5         MEDICAL DECISION MAKING    DATA    Labs:   Lab Results   Component Value Date/Time    SODIUM 138 12/14/2023 08:15 AM    POTASSIUM 4.0 12/14/2023 08:15 AM    CHLORIDE 101 12/14/2023 08:15 AM    CO2 29 12/14/2023 08:15 AM    GLUCOSE 89 12/14/2023 08:15 AM    BUN 12 12/14/2023 08:15 AM    CREATININE 0.78 12/14/2023 08:15 AM        No results found for: \"PROTHROMBTM\", \"INR\"     Lab Results   Component Value Date/Time    WBC 4.4 (L) 12/14/2023 08:15 AM    RBC 4.52 12/14/2023 08:15 AM    HEMOGLOBIN 14.0 12/14/2023 08:15 AM    HEMATOCRIT 42.6 12/14/2023 08:15 AM    MCV 94.2 12/14/2023 08:15 AM    MCH 31.0 12/14/2023 08:15 AM    MCHC 32.9 12/14/2023 08:15 AM    MPV 10.8 12/14/2023 08:15 AM    NEUTSPOLYS 49.60 12/14/2023 08:15 AM    LYMPHOCYTES 38.40 12/14/2023 08:15 AM    MONOCYTES 10.00 12/14/2023 08:15 AM    EOSINOPHILS 1.10 12/14/2023 08:15 AM    BASOPHILS 0.90 12/14/2023 08:15 AM        No results found for: \"HBA1C\"       Imaging:   I personally reviewed following images    MRI cervical spine 2/20/2023  T2 subacute compression fracture.  Multilevel degenerative changes with facet arthropathy and foraminal stenosis.    MRI left elbow 11/3/2023      I reviewed the following radiology reports                     Results for orders placed in visit on 02/24/23    MR-CERVICAL SPINE-W/O  FINDINGS:  There is subacute fracture along the anterior superior corner of T2 vertebral body. Bone marrow edema is noted along the superior portion. There is no disruption of the posterior cortex. The anterior and posterior longitudinal ligaments are intact. There   is no epidural hemorrhage. There is no evidence of spinal cord injury.     The cervical spine maintains normal height and alignment. There is no fracture or dislocation. There is no pathologic marrow infiltration. There is no focal osseous lesion.     At the level of C2-3,there is no spinal or neural foraminal stenosis.     At the level of C3-4,there is " minimal disc bulge. There is no spinal or neural foraminal stenosis.     At the level of C4-5,there is disc degeneration. There is diffuse disc bulge. Bilateral uncinate joint arthropathy is seen. There is effacement of the ventral subarachnoid space. There is severe right and mild left neural foraminal stenosis.     At the level of C5-6,there is disc degeneration. Bilateral uncinate joint arthropathy is seen. There is severe bilateral neural foraminal stenosis. There is effacement of the ventral subarachnoid space. There is flattening of the anterior spinal cord   contour. There is mild central canal stenosis.     At the level of C6-7,there is disc degeneration. There is diffuse disc bulge. Bilateral uncinate joint arthropathy is seen. There is moderate bilateral neural foraminal stenosis. There is no central canal stenosis.     At the level of C7-T1,there is no spinal or neural foraminal stenosis.     The visualized posterior fossa structures appear normal within limits.  The cervical spinal cord does not demonstrate any mass or abnormal T2 signal intensity.     There is tiny 4 mm sized left thyroid nodule.    Impression  1.  There is subacute fracture along the anterior superior corner of T2 vertebral body. Bone marrow edema is noted along the superior portion. There is no disruption of the posterior cortex. The anterior and posterior longitudinal ligaments are intact.  There is no epidural hemorrhage. This is new since the previous study. There is no evidence of spinal cord injury.  2.  Multifocal degenerative disease in the cervical spine as described above. There has been no significant interval change.  3.  There is tiny 4 mm sized left thyroid nodule stable since the previous study.      Results for orders placed during the hospital encounter of 12/20/19    MR-LUMBAR SPINE-W/O    Impression  1.  Mild degenerative changes of the lumbar spine  2.  No areas of high-grade central canal or neural foraminal  narrowing  3.  Mild LEFT neural foraminal narrowing at L4-L5  4.  Segment 1 hepatic lesion, incompletely assessed but likely a cyst or hemangioma        Results for orders placed during the hospital encounter of 12/20/19    MR-LUMBAR SPINE-W/O    Impression  1.  Mild degenerative changes of the lumbar spine  2.  No areas of high-grade central canal or neural foraminal narrowing  3.  Mild LEFT neural foraminal narrowing at L4-L5  4.  Segment 1 hepatic lesion, incompletely assessed but likely a cyst or hemangioma                                                                       Results for orders placed during the hospital encounter of 11/11/22    CT-ABDOMEN-PELVIS WITH    Impression  1.  There is no evidence of bowel obstruction or acute inflammation.  2.  There is air in the splenic flexure and descending colon and stool in the right colon but there is no colonic dilatation.  3.  No extraluminal air or free intraperitoneal air.  4.  No abnormal vascular enhancement.  5.  Gallbladder is surgically absent with probable physiologic dilatation of the intrahepatic and extra hepatic biliary tree. No obstructive mass or stone.  6.  Normal appendix.  7.  Incidental benign hepatic hemangiomas.                    Results for orders placed in visit on 02/23/23    DX-CERVICAL SPINE-2 OR 3 VIEWS    Impression  1.  Multilevel facet arthropathy and endplate spurring    2.  Minimal posterior subluxations at C4-5 and C5-6        Results for orders placed in visit on 03/30/23    DX-ELBOW-COMPLETE 3+ LEFT      Results for orders placed in visit on 07/25/22    DX-FOOT-COMPLETE 3+ RIGHT                               Results for orders placed in visit on 11/19/21    DX-WRIST-COMPLETE 3+ LEFT         DIAGNOSIS   Visit Diagnoses     ICD-10-CM   1. Cervical spondylosis  M47.812   2. Cervical spinal stenosis  M48.02   3. Chronic neck pain  M54.2    G89.29   4. DDD (degenerative disc disease), cervical  M50.30   5. 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    Diagnoses and all orders for this visit:    Cervical spondylosis  -     Referral to Physical Medicine Rehab    Cervical spinal stenosis    Chronic neck pain    DDD (degenerative disc disease), cervical    Cervical radiculopathy, stable        Status post diagnostic medial branch blocks x2 targeting the RIGHT C3-4, C4-5, and C5-6 facet joints with greater than 80% pain relief during the diagnostic phase.  This represents a positive diagnostic block.    I believe the patient is a good candidate for RIGHT radiofrequency neurotomy targeting the medial branches innervating the C3-4, C4-5, and C5-6 facet joints    The risks benefits and alternatives to this procedure were discussed and the patient wishes to proceed with the procedure. Risks include but are not limited to damage to surrounding structures, infection, bleeding, worsening of pain which can be permanent, weakness which can be permanent. Benefits include pain relief, improved function. Alternatives includes not doing the procedure.           Medications: Continue gabapentin and meloxicam    Follow up: After the above procedures      Thank you for allowing me to participate in the care of this patient. If you have any questions please not hesitate to contact me.             Please note that this dictation was created using voice recognition software. I have made every reasonable attempt to correct obvious errors but there may be errors of grammar and content that I may have overlooked prior to finalization of this note.      Akira Briscoe MD  Interventional Spine and Sports Physiatry  Physical Medicine and Rehabilitation  [facility]

## 2024-09-03 ENCOUNTER — APPOINTMENT (OUTPATIENT)
Dept: RADIOLOGY | Facility: REHABILITATION | Age: 59
End: 2024-09-03
Attending: PHYSICAL MEDICINE & REHABILITATION
Payer: COMMERCIAL

## 2024-09-03 VITALS
DIASTOLIC BLOOD PRESSURE: 73 MMHG | HEIGHT: 65 IN | RESPIRATION RATE: 16 BRPM | OXYGEN SATURATION: 94 % | WEIGHT: 124.12 LBS | BODY MASS INDEX: 20.68 KG/M2 | HEART RATE: 59 BPM | SYSTOLIC BLOOD PRESSURE: 131 MMHG | TEMPERATURE: 97.2 F

## 2024-09-03 PROCEDURE — 700111 HCHG RX REV CODE 636 W/ 250 OVERRIDE (IP)

## 2024-09-03 PROCEDURE — 64634 DESTROY C/TH FACET JNT ADDL: CPT

## 2024-09-03 PROCEDURE — 700111 HCHG RX REV CODE 636 W/ 250 OVERRIDE (IP): Performed by: PHYSICAL MEDICINE & REHABILITATION

## 2024-09-03 PROCEDURE — 700101 HCHG RX REV CODE 250: Performed by: PHYSICAL MEDICINE & REHABILITATION

## 2024-09-03 PROCEDURE — 99152 MOD SED SAME PHYS/QHP 5/>YRS: CPT

## 2024-09-03 PROCEDURE — 64633 DESTROY CERV/THOR FACET JNT: CPT

## 2024-09-03 RX ORDER — ONDANSETRON 2 MG/ML
4 INJECTION INTRAMUSCULAR; INTRAVENOUS ONCE
Status: COMPLETED | OUTPATIENT
Start: 2024-09-03 | End: 2024-09-03

## 2024-09-03 RX ORDER — ROPIVACAINE HYDROCHLORIDE 5 MG/ML
10 INJECTION, SOLUTION EPIDURAL; INFILTRATION; PERINEURAL ONCE
Status: COMPLETED | OUTPATIENT
Start: 2024-09-03 | End: 2024-09-03

## 2024-09-03 RX ORDER — MIDAZOLAM HYDROCHLORIDE 1 MG/ML
INJECTION INTRAMUSCULAR; INTRAVENOUS
Status: COMPLETED
Start: 2024-09-03 | End: 2024-09-03

## 2024-09-03 RX ORDER — ONDANSETRON 2 MG/ML
INJECTION INTRAMUSCULAR; INTRAVENOUS
Status: DISPENSED
Start: 2024-09-03 | End: 2024-09-04

## 2024-09-03 RX ORDER — LIDOCAINE HYDROCHLORIDE 10 MG/ML
INJECTION, SOLUTION EPIDURAL; INFILTRATION; INTRACAUDAL; PERINEURAL
Status: DISPENSED
Start: 2024-09-03 | End: 2024-09-04

## 2024-09-03 RX ORDER — LIDOCAINE HYDROCHLORIDE 10 MG/ML
30 INJECTION, SOLUTION EPIDURAL; INFILTRATION; INTRACAUDAL; PERINEURAL ONCE
Status: COMPLETED | OUTPATIENT
Start: 2024-09-03 | End: 2024-09-03

## 2024-09-03 RX ORDER — ROPIVACAINE HYDROCHLORIDE 5 MG/ML
INJECTION, SOLUTION EPIDURAL; INFILTRATION; PERINEURAL
Status: DISPENSED
Start: 2024-09-03 | End: 2024-09-04

## 2024-09-03 RX ADMIN — LIDOCAINE HYDROCHLORIDE 30 ML: 10 INJECTION, SOLUTION INFILTRATION; PERINEURAL at 11:51

## 2024-09-03 RX ADMIN — ROPIVACAINE HYDROCHLORIDE 10 ML: 5 INJECTION, SOLUTION EPIDURAL; INFILTRATION; PERINEURAL at 11:53

## 2024-09-03 RX ADMIN — MIDAZOLAM HYDROCHLORIDE 0.5 MG: 2 INJECTION, SOLUTION INTRAMUSCULAR; INTRAVENOUS at 11:47

## 2024-09-03 RX ADMIN — FENTANYL CITRATE 25 MCG: 50 INJECTION, SOLUTION INTRAMUSCULAR; INTRAVENOUS at 11:46

## 2024-09-03 RX ADMIN — ONDANSETRON 4 MG: 2 INJECTION INTRAMUSCULAR; INTRAVENOUS at 11:50

## 2024-09-03 ASSESSMENT — FIBROSIS 4 INDEX: FIB4 SCORE: 0.84

## 2024-09-03 ASSESSMENT — PAIN DESCRIPTION - PAIN TYPE
TYPE: CHRONIC PAIN

## 2024-09-03 NOTE — OR SURGEON
Immediate Post OP Note    Pre-Op Diagnosis Codes:      * Cervical spondylosis [M47.812]      Post-Op Diagnosis Codes:     * Cervical spondylosis [M47.812]      Procedure(s):  Radiofrequency neurotomy targeting the medial branches innervating the RIGHT cervical 3-4, cervical 4-5, cervical 5-6 facet joints with sedation    Sedation 0.5 mg Versed and 25 mcg fentanyl- Wound Class: Clean    Surgeon(s):  Akira Briscoe M.D.    Anesthesiologist/Type of Anesthesia:  No anesthesia staff entered./Sedation    Surgical Staff:  Circulator: Clare Schwartz R.N.  Scrub Person: Cheyenne Weiner  Radiology Technologist: Jesus Alberto Perez    Specimens removed if any:  * No specimens in log *    Estimated Blood Loss: None    Findings: None    Complications: None        9/3/2024 12:09 PM Akira Briscoe M.D.

## 2024-09-03 NOTE — OP REPORT
Date of Service: 9/3/2024    Physician/s: Akira Briscoe MD    Pre-operative Diagnosis: Cervical spondylosis, facet arthropathy     Post-operative Diagnosis: Cervical spondylosis, facet arthropathy     Procedure: RIGHT Medial Branch Radiofrequency neurotomy targeting the C3-4, C4-5, and C5-6 facet joints    Description of procedure:    The patient was not treated for radiculopathy at this time    The risks, benefits, and alternatives of the procedure were reviewed and discussed with the patient.  Written informed consent was freely obtained. A pre-procedural time-out was conducted by the physician verifying patient’s identity, procedure to be performed, procedure site and side, and allergy verification. Appropriate equipment was determined to be in place for the procedure.     An IV was placed peripherally, the patient received 0.5mg of IV Versed for anxiolysis, and 25mcg  of IV Fentanyl for pain control. The patient's vital signs were carefully monitored before, throughout, and after the procedure. This was for greater than 15 minutes.     In the fluoroscopy suite the patient was placed in a prone position, a pillow placed underneath their chest, and the head was turned to the opposite side of injection. The skin was prepped and draped in the usual sterile fashion. The fluoroscope was placed over the cervical neck at the appropriate angles, and the targets for needle/probe placement were marked. A 25g needle was placed into each of the markings at the target levels, and 1cc of 1% Lidocaine was injected subcutaneously into the epidermal and dermal layers. The needle was removed.     An 20 gauge cannula with a 10 mm active tip was then placed at the lateral upper/middle aspect of the articular pillars, whereby the medial branch runs, at the cervical 3, cervical 4, cervical 5, cervical 6 levels on the RIGHT side. This was targetting The needle/probe tips were then verified by AP, lateral, and contralateral oblique  views. Once the needle/probe tips were in the optimal positions for radiofrequency neurotomy, Motor stimulation is used as an extra precaution to ensure the needle position is off the cervical nerve roots, prior to each lesion. There is no motor stimulation in the arm and there was twitching in the cervical paraspinous muscles as expected.  Following negative aspiration, 1cc of 1% Lidocaine was then injected at each location. After a wait period of approximately 1 minute, a radiofrequency lesion was then created at each level with a temperature of 80 degrees centigrade for 90 seconds.       The needles were adjusted to a second location still targeting the same facet joints as above.  This was confirmed with fluoroscopic guidance with AP, lateral, oblique views.  Motor testing was done which showed cervical paraspinous muscle twitching with no twitching in the arm. A Second radiofrequency lesion was made with a temperature of 80 °C for 90 seconds.        The radiofrequency probes were removed intact.      The patient's back was covered with a 4x4 gauze, the area was cleansed with sterile normal saline, and a dressing was applied. There were no complications noted, the patient remained hemodynamically stable, and the patient tolerated the procedure well.      The patient had 100% pain relief postprocedure  Preprocedure pain 5/10 NRS  Postprocedure pain 0 /10 NRS     Follow-up as scheduled    Akira Brsicoe MD  Interventional Spine and Pain  Physical Medicine and Rehabilitation  [facility]        CPT  Radiofrequency ablation (RFA) - cervical or thoracic (1st joint):  47785  Radiofrequency ablation (RFA) - cervical or thoracic (each additional level):  64634 x 2   moderate procedural sedation first 15 minutes: 07150

## 2024-09-03 NOTE — INTERVAL H&P NOTE
H&P reviewed. The patient was examined and there are no changes to the H&P       Lungs clear to auscultation bilaterally.  No abdominal tenderness.  Heart regular rate and rhythm.  Vitals reviewed.    Proceed with the originally scheduled procedure.  The risks, benefits and alternatives were discussed.  The patient understands these.    Pre-Op Diagnosis Codes:      * Cervical spondylosis [M47.812]  Procedure(s):  Radiofrequency neurotomy targeting the medial branches innervating the RIGHT cervical 3-4, cervical 4-5, cervical 5-6 facet joints with sedation….. Note: Plan for 80 °C for 90 seconds for each neurotomy    Akira Briscoe MD  Physical Medicine and Rehabilitation  Interventional Spine and Sports Physiatry  [facility]

## 2024-09-05 ENCOUNTER — TELEPHONE (OUTPATIENT)
Dept: PHYSICAL MEDICINE AND REHAB | Facility: MEDICAL CENTER | Age: 59
End: 2024-09-05
Payer: COMMERCIAL

## 2024-09-05 NOTE — TELEPHONE ENCOUNTER
Called for Post -SP check up: Radiofrequency neurotomy targeting the medial branches innervating the RIGHT cervical 3-4, cervical 4-5, cervical 5-6 facet joints with sedation     Change in pain:    Concerns?    Confirmed [facility] appt?

## 2024-10-15 ENCOUNTER — APPOINTMENT (OUTPATIENT)
Dept: PHYSICAL MEDICINE AND REHAB | Facility: MEDICAL CENTER | Age: 59
End: 2024-10-15
Payer: COMMERCIAL

## 2024-10-15 VITALS
DIASTOLIC BLOOD PRESSURE: 78 MMHG | SYSTOLIC BLOOD PRESSURE: 122 MMHG | OXYGEN SATURATION: 96 % | HEART RATE: 52 BPM | HEIGHT: 65 IN | TEMPERATURE: 98.1 F | WEIGHT: 125.6 LBS | BODY MASS INDEX: 20.93 KG/M2

## 2024-10-15 DIAGNOSIS — M47.812 CERVICAL SPONDYLOSIS: ICD-10-CM

## 2024-10-15 DIAGNOSIS — M50.30 DDD (DEGENERATIVE DISC DISEASE), CERVICAL: ICD-10-CM

## 2024-10-15 DIAGNOSIS — M25.522 CHRONIC ELBOW PAIN, LEFT: ICD-10-CM

## 2024-10-15 DIAGNOSIS — G89.29 CHRONIC NECK PAIN: ICD-10-CM

## 2024-10-15 DIAGNOSIS — M48.02 CERVICAL SPINAL STENOSIS: ICD-10-CM

## 2024-10-15 DIAGNOSIS — G89.29 CHRONIC ELBOW PAIN, LEFT: ICD-10-CM

## 2024-10-15 DIAGNOSIS — M54.2 CHRONIC NECK PAIN: ICD-10-CM

## 2024-10-15 DIAGNOSIS — M54.12 CERVICAL RADICULOPATHY: ICD-10-CM

## 2024-10-15 PROCEDURE — G2211 COMPLEX E/M VISIT ADD ON: HCPCS | Performed by: PHYSICAL MEDICINE & REHABILITATION

## 2024-10-15 PROCEDURE — 3078F DIAST BP <80 MM HG: CPT | Performed by: PHYSICAL MEDICINE & REHABILITATION

## 2024-10-15 PROCEDURE — 1125F AMNT PAIN NOTED PAIN PRSNT: CPT | Performed by: PHYSICAL MEDICINE & REHABILITATION

## 2024-10-15 PROCEDURE — 99214 OFFICE O/P EST MOD 30 MIN: CPT | Performed by: PHYSICAL MEDICINE & REHABILITATION

## 2024-10-15 PROCEDURE — 3074F SYST BP LT 130 MM HG: CPT | Performed by: PHYSICAL MEDICINE & REHABILITATION

## 2024-10-15 ASSESSMENT — PAIN SCALES - GENERAL: PAINLEVEL: 1=MINIMAL PAIN

## 2024-10-15 ASSESSMENT — PATIENT HEALTH QUESTIONNAIRE - PHQ9: CLINICAL INTERPRETATION OF PHQ2 SCORE: 0

## 2024-10-15 ASSESSMENT — FIBROSIS 4 INDEX: FIB4 SCORE: 0.84

## 2025-01-18 NOTE — PROCEDURE: MIPS QUALITY
59.3
Previously Negative (within the last year)
Detail Level: Detailed
Quality 226: Preventive Care And Screening: Tobacco Use: Screening And Cessation Intervention: Patient screened for tobacco use and is an ex/non-smoker
Quality 265: Biopsy Follow-Up: Biopsy results reviewed, communicated, tracked, and documented
Quality 130: Documentation Of Current Medications In The Medical Record: Current Medications Documented
Quality 431: Preventive Care And Screening: Unhealthy Alcohol Use - Screening: Patient not identified as an unhealthy alcohol user when screened for unhealthy alcohol use using a systematic screening method

## 2025-01-22 ENCOUNTER — TELEPHONE (OUTPATIENT)
Dept: HEALTH INFORMATION MANAGEMENT | Facility: OTHER | Age: 60
End: 2025-01-22
Payer: COMMERCIAL

## 2025-02-07 ENCOUNTER — HOSPITAL ENCOUNTER (OUTPATIENT)
Dept: RADIOLOGY | Facility: MEDICAL CENTER | Age: 60
End: 2025-02-07
Attending: FAMILY MEDICINE
Payer: COMMERCIAL

## 2025-02-07 DIAGNOSIS — Z12.31 VISIT FOR SCREENING MAMMOGRAM: ICD-10-CM

## 2025-02-07 PROCEDURE — 77067 SCR MAMMO BI INCL CAD: CPT

## 2025-02-10 ENCOUNTER — OFFICE VISIT (OUTPATIENT)
Dept: MEDICAL GROUP | Facility: MEDICAL CENTER | Age: 60
End: 2025-02-10
Payer: COMMERCIAL

## 2025-02-10 VITALS
OXYGEN SATURATION: 98 % | HEART RATE: 60 BPM | TEMPERATURE: 97.8 F | SYSTOLIC BLOOD PRESSURE: 102 MMHG | RESPIRATION RATE: 18 BRPM | BODY MASS INDEX: 21.33 KG/M2 | WEIGHT: 128 LBS | DIASTOLIC BLOOD PRESSURE: 62 MMHG | HEIGHT: 65 IN

## 2025-02-10 DIAGNOSIS — M54.2 NECK PAIN: ICD-10-CM

## 2025-02-10 DIAGNOSIS — M25.561 PAIN IN BOTH KNEES, UNSPECIFIED CHRONICITY: ICD-10-CM

## 2025-02-10 DIAGNOSIS — Z00.00 PREVENTATIVE HEALTH CARE: ICD-10-CM

## 2025-02-10 DIAGNOSIS — M25.562 PAIN IN BOTH KNEES, UNSPECIFIED CHRONICITY: ICD-10-CM

## 2025-02-10 PROCEDURE — 99214 OFFICE O/P EST MOD 30 MIN: CPT | Mod: 25 | Performed by: FAMILY MEDICINE

## 2025-02-10 PROCEDURE — 90471 IMMUNIZATION ADMIN: CPT | Performed by: FAMILY MEDICINE

## 2025-02-10 PROCEDURE — 90651 9VHPV VACCINE 2/3 DOSE IM: CPT | Mod: JZ | Performed by: FAMILY MEDICINE

## 2025-02-10 RX ORDER — LORAZEPAM 1 MG/1
1 TABLET ORAL EVERY 4 HOURS PRN
Qty: 30 TABLET | Refills: 0 | Status: SHIPPED | OUTPATIENT
Start: 2025-02-10 | End: 2025-03-12

## 2025-02-10 ASSESSMENT — FIBROSIS 4 INDEX: FIB4 SCORE: 0.85

## 2025-02-10 ASSESSMENT — PATIENT HEALTH QUESTIONNAIRE - PHQ9: CLINICAL INTERPRETATION OF PHQ2 SCORE: 0

## 2025-02-10 NOTE — PROGRESS NOTES
This medical record contains text that has been entered with the assistance of computer voice recognition and dictation software.  Therefore, it may contain unintended errors in text, spelling, punctuation, or grammar        Chief Complaint   Patient presents with    Knee Pain     Pt gets a popping sensation on the lateral part of the knee, Rt. Worse than Lt. 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 30 Tablet 0    Naproxen Sodium (ALEVE PO) Take  by mouth as needed.      propranolol (INDERAL) 20 MG Tab TAKE 1 TABLET BY MOUTH 30 MIN BEFORE THE EVENT FOR ANTICIPATORY ANXIETY. 90 Tablet 2    gabapentin (NEURONTIN) 100 MG Cap Take 1-3 Capsules by mouth at bedtime as needed (pain). 90 Capsule 11    meloxicam (MOBIC) 15 MG tablet Take 1 Tablet by mouth 1 time a day as needed (pain). Do not take other NSAIDs. No refills. 60 Tablet 0    sumatriptan (IMITREX) 100 MG tablet TAKE 1 TABLET BY MOUTH EVERY DAY AS NEEDED FOR HEAD ACHE 10 Tablet 12    sumatriptan (IMITREX) 100 MG tablet Take 1 Tablet by mouth one time as needed for Migraine for up to 1 dose. 10 Tablet 3    methocarbamol (ROBAXIN) 500 MG Tab Take 1 Tablet by mouth 3 times a day as needed (neck pain). 30 Tablet 1    ondansetron (ZOFRAN) 4 MG Tab tablet TAKE 1 TABLET BY MOUTH TWICE A DAY AS NEEDED FOR NAUSEA AND VOMITING      COVID-19mRNA Bival Vacc Pfizer (PFIZER COVID-19 BIVALENT) 30 MCG/0.3ML Suspension injection Inject  into the shoulder, thigh, or buttocks. (Patient not taking: Reported on 4/24/2023) 0.3 mL 0    Calcium-Magnesium-Vitamin D (CALCIUM MAGNESIUM PO) Take  by mouth every day. (Patient not taking: Reported on 2/10/2025)        No current facility-administered medications for this visit.     Patient Active Problem List    Diagnosis Date Noted    Pain in both knees 02/10/2025    Moderate sun exposure 2022    Pain of foot 2022    Preventative health care 2022    Basal cell carcinoma 2020    Healthcare maintenance 2018    Seborrheic keratoses 2018    Neck pain 2018     Past Surgical History:   Procedure Laterality Date    RADIO FREQUENCY ABLATION ADDITIONAL LEVEL Right 9/3/2024    Procedure: Radiofrequency neurotomy targeting the medial branches innervating the RIGHT cervical 3-4, cervical 4-5, cervical 5-6 facet joints with sedation….. Note: Plan for 80 °C for 90 seconds for each neurotomy;  Surgeon: Akira Briscoe M.D.;  [facility];  Service: Pain Management    INJ,ANES LUMBAR/CERVICAL Right 2024    Procedure: Medial branch blocks targeting the RIGHT C3-4, C4-5, and C5-6 facet joints with fluoroscopic guidance #2;  Surgeon: Akira Briscoe M.D.;  [facility];  Service: Pain Management    INJ,ANES LUMBAR/CERVICAL Right 2024    Procedure: Medial branch blocks targeting the RIGHT C3-4, C4-5, and C5-6 facet joints with fluoroscopic guidance #1;  Surgeon: Akira Briscoe M.D.;  [facility];  Service: Pain Management    BONE BIOPSY Left 2016    Procedure: WRIST BIOPSY WITH POSSIBLE BONE GRAFT ALLO ;  Surgeon: Benjamin Menezes M.D.;  Location: [facility];  Service:     BLEPHAROPLASTY  2008    CHOLECYSTECTOMY      PRIMARY C SECTION  1998          Social History     Tobacco Use    Smoking status: Never    Smokeless tobacco: Never   Vaping Use    Vaping status: Never Used   Substance Use Topics    Alcohol use: Yes     Comment: Occasional    Drug use: No     Family History   Problem Relation Age of Onset    Cancer Other     Cancer Paternal Aunt     Arthritis Mother      "Hyperlipidemia Mother     Arthritis Brother     Cancer Brother         Prostate           ROS    all review of system completed and negative except for those listed above     Objective:     /62 (BP Location: Left arm, Patient Position: Sitting, BP Cuff Size: Adult)   Pulse 60   Temp 36.6 °C (97.8 °F) (Temporal)   Resp 18   Ht 1.651 m (5' 5\")   Wt 58.1 kg (128 lb)   SpO2 98%  Body mass index is 21.3 kg/m².  Physical Exam:    Constitutional: Alert, no distress.  Skin: Warm, dry, good turgor, no rashes in visible areas.  Eye: Equal, round and reactive, conjunctiva clear, lids normal.  ENMT: Lips without lesions, good dentition, oropharynx clear.  Neck: Trachea midline, no masses, no thyromegaly. No cervical or supraclavicular lymphadenopathy.  Respiratory: Unlabored respiratory effort, lungs clear to auscultation, no wheezes, no ronchi.  Cardiovascular: Normal S1, S2, no murmur, no edema.  Abdomen: Soft, non-tender, no masses, no hepatosplenomegaly.  Psych: Alert and oriented x3, normal affect and mood.          Assessment and Plan:   The following treatment plan was discussed        Problem List Items Addressed This Visit       Neck pain     Had ablation with Dr Briscoe   Doing much better since!             Relevant Medications    LORazepam (ATIVAN) 1 MG Tab    Preventative health care     Age appropriate prev health care discussed   Cancer screening discussed   Vaccines discussed   Labs offered   Blood pressure at goal     Anticipatory guidance including SPF and other skin protective measures, dental hygiene and care, regular exercise, diet, stress and family planning advice discussed.      Labs ordered   HPV vaccine encouraged although not covered              Relevant Orders    TSH WITH REFLEX TO FT4    VITAMIN D,25 HYDROXY (DEFICIENCY)    Comp Metabolic Panel    CBC WITH DIFFERENTIAL    Lipid Profile    HIV AG/AB COMBO ASSAY SCREENING    HEP C VIRUS ANTIBODY    9VHPV Vaccine 2-3 Dose IM (Completed)    " "Pain in both knees     While skiing   Would like to try pt   Can also consider MSK consult if needed     Anterior lateral knee pain /popping   Consider patello femoral pain syndrome (runners knee) vs IT band dysfunction   With PFPS ok to \"push through\" the pain a bit with strengthening exercises as we tend to see symptoms get worse before they get better    30+ min spent          Relevant Orders    Referral to Physical Therapy             Instructed to follow up if symptoms worsen or fail to improve, ER/UC precautions discussed as well    Allegra Luong MD  [facility], Family Medicine   [address] [place], [place] [address]y   [place] [address]  Phone: [phone]               "

## 2025-02-10 NOTE — ASSESSMENT & PLAN NOTE
"While skiing   Would like to try pt   Can also consider MSK consult if needed     Anterior lateral knee pain /popping   Consider patello femoral pain syndrome (runners knee) vs IT band dysfunction   With PFPS ok to \"push through\" the pain a bit with strengthening exercises as we tend to see symptoms get worse before they get better    30+ min spent   "

## 2025-02-10 NOTE — ASSESSMENT & PLAN NOTE
Age appropriate prev health care discussed   Cancer screening discussed   Vaccines discussed   Labs offered   Blood pressure at goal     Anticipatory guidance including SPF and other skin protective measures, dental hygiene and care, regular exercise, diet, stress and family planning advice discussed.      Labs ordered   HPV vaccine encouraged although not covered

## 2025-02-11 ENCOUNTER — HOSPITAL ENCOUNTER (OUTPATIENT)
Dept: LAB | Facility: MEDICAL CENTER | Age: 60
End: 2025-02-11
Attending: FAMILY MEDICINE
Payer: COMMERCIAL

## 2025-02-11 DIAGNOSIS — Z00.00 PREVENTATIVE HEALTH CARE: ICD-10-CM

## 2025-02-11 LAB
BASOPHILS # BLD AUTO: 0.8 % (ref 0–1.8)
BASOPHILS # BLD: 0.05 K/UL (ref 0–0.12)
EOSINOPHIL # BLD AUTO: 0.04 K/UL (ref 0–0.51)
EOSINOPHIL NFR BLD: 0.7 % (ref 0–6.9)
ERYTHROCYTE [DISTWIDTH] IN BLOOD BY AUTOMATED COUNT: 45.1 FL (ref 35.9–50)
HCT VFR BLD AUTO: 42.2 % (ref 37–47)
HGB BLD-MCNC: 13.8 G/DL (ref 12–16)
IMM GRANULOCYTES # BLD AUTO: 0.02 K/UL (ref 0–0.11)
IMM GRANULOCYTES NFR BLD AUTO: 0.3 % (ref 0–0.9)
LYMPHOCYTES # BLD AUTO: 2.24 K/UL (ref 1–4.8)
LYMPHOCYTES NFR BLD: 37.5 % (ref 22–41)
MCH RBC QN AUTO: 30.8 PG (ref 27–33)
MCHC RBC AUTO-ENTMCNC: 32.7 G/DL (ref 32.2–35.5)
MCV RBC AUTO: 94.2 FL (ref 81.4–97.8)
MONOCYTES # BLD AUTO: 0.55 K/UL (ref 0–0.85)
MONOCYTES NFR BLD AUTO: 9.2 % (ref 0–13.4)
NEUTROPHILS # BLD AUTO: 3.08 K/UL (ref 1.82–7.42)
NEUTROPHILS NFR BLD: 51.5 % (ref 44–72)
NRBC # BLD AUTO: 0 K/UL
NRBC BLD-RTO: 0 /100 WBC (ref 0–0.2)
PLATELET # BLD AUTO: 257 K/UL (ref 164–446)
PMV BLD AUTO: 10.9 FL (ref 9–12.9)
RBC # BLD AUTO: 4.48 M/UL (ref 4.2–5.4)
WBC # BLD AUTO: 6 K/UL (ref 4.8–10.8)

## 2025-02-11 PROCEDURE — 87389 HIV-1 AG W/HIV-1&-2 AB AG IA: CPT

## 2025-02-11 PROCEDURE — 86803 HEPATITIS C AB TEST: CPT

## 2025-02-11 PROCEDURE — 80061 LIPID PANEL: CPT

## 2025-02-11 PROCEDURE — 84443 ASSAY THYROID STIM HORMONE: CPT

## 2025-02-11 PROCEDURE — 80053 COMPREHEN METABOLIC PANEL: CPT

## 2025-02-11 PROCEDURE — 82306 VITAMIN D 25 HYDROXY: CPT

## 2025-02-11 PROCEDURE — 85025 COMPLETE CBC W/AUTO DIFF WBC: CPT

## 2025-02-11 PROCEDURE — 36415 COLL VENOUS BLD VENIPUNCTURE: CPT

## 2025-02-12 ENCOUNTER — APPOINTMENT (OUTPATIENT)
Dept: URBAN - METROPOLITAN AREA CLINIC 6 | Facility: CLINIC | Age: 60
Setting detail: DERMATOLOGY
End: 2025-02-12

## 2025-02-12 DIAGNOSIS — Z85.828 PERSONAL HISTORY OF OTHER MALIGNANT NEOPLASM OF SKIN: ICD-10-CM | Status: STABLE

## 2025-02-12 DIAGNOSIS — D18.0 HEMANGIOMA: ICD-10-CM

## 2025-02-12 DIAGNOSIS — L85.3 XEROSIS CUTIS: ICD-10-CM

## 2025-02-12 DIAGNOSIS — B35.1 TINEA UNGUIUM: ICD-10-CM | Status: INADEQUATELY CONTROLLED

## 2025-02-12 DIAGNOSIS — L82.0 INFLAMED SEBORRHEIC KERATOSIS: ICD-10-CM

## 2025-02-12 DIAGNOSIS — L82.1 OTHER SEBORRHEIC KERATOSIS: ICD-10-CM

## 2025-02-12 DIAGNOSIS — Z87.2 PERSONAL HISTORY OF DISEASES OF THE SKIN AND SUBCUTANEOUS TISSUE: ICD-10-CM | Status: STABLE

## 2025-02-12 DIAGNOSIS — D22 MELANOCYTIC NEVI: ICD-10-CM

## 2025-02-12 DIAGNOSIS — L81.4 OTHER MELANIN HYPERPIGMENTATION: ICD-10-CM

## 2025-02-12 DIAGNOSIS — Z71.89 OTHER SPECIFIED COUNSELING: ICD-10-CM

## 2025-02-12 PROBLEM — D18.01 HEMANGIOMA OF SKIN AND SUBCUTANEOUS TISSUE: Status: ACTIVE | Noted: 2025-02-12

## 2025-02-12 PROBLEM — D22.5 MELANOCYTIC NEVI OF TRUNK: Status: ACTIVE | Noted: 2025-02-12

## 2025-02-12 LAB
25(OH)D3 SERPL-MCNC: 39 NG/ML (ref 30–100)
ALBUMIN SERPL BCP-MCNC: 4.5 G/DL (ref 3.2–4.9)
ALBUMIN/GLOB SERPL: 1.6 G/DL
ALP SERPL-CCNC: 61 U/L (ref 30–99)
ALT SERPL-CCNC: 28 U/L (ref 2–50)
ANION GAP SERPL CALC-SCNC: 10 MMOL/L (ref 7–16)
AST SERPL-CCNC: 31 U/L (ref 12–45)
BILIRUB SERPL-MCNC: 0.5 MG/DL (ref 0.1–1.5)
BUN SERPL-MCNC: 8 MG/DL (ref 8–22)
CALCIUM ALBUM COR SERPL-MCNC: 9.3 MG/DL (ref 8.5–10.5)
CALCIUM SERPL-MCNC: 9.7 MG/DL (ref 8.5–10.5)
CHLORIDE SERPL-SCNC: 96 MMOL/L (ref 96–112)
CHOLEST SERPL-MCNC: 258 MG/DL (ref 100–199)
CO2 SERPL-SCNC: 25 MMOL/L (ref 20–33)
CREAT SERPL-MCNC: 0.76 MG/DL (ref 0.5–1.4)
FASTING STATUS PATIENT QL REPORTED: NORMAL
GFR SERPLBLD CREATININE-BSD FMLA CKD-EPI: 89 ML/MIN/1.73 M 2
GLOBULIN SER CALC-MCNC: 2.8 G/DL (ref 1.9–3.5)
GLUCOSE SERPL-MCNC: 79 MG/DL (ref 65–99)
HCV AB SER QL: NORMAL
HDLC SERPL-MCNC: 98 MG/DL
HIV 1+2 AB+HIV1 P24 AG SERPL QL IA: NORMAL
LDLC SERPL CALC-MCNC: 147 MG/DL
POTASSIUM SERPL-SCNC: 3.9 MMOL/L (ref 3.6–5.5)
PROT SERPL-MCNC: 7.3 G/DL (ref 6–8.2)
SODIUM SERPL-SCNC: 131 MMOL/L (ref 135–145)
TRIGL SERPL-MCNC: 66 MG/DL (ref 0–149)
TSH SERPL DL<=0.005 MIU/L-ACNC: 1.42 UIU/ML (ref 0.38–5.33)

## 2025-02-12 PROCEDURE — ? SUNSCREEN TREATMENT REGIMEN

## 2025-02-12 PROCEDURE — ? PRESCRIPTION

## 2025-02-12 PROCEDURE — 17110 DESTRUCTION B9 LES UP TO 14: CPT

## 2025-02-12 PROCEDURE — ? COUNSELING

## 2025-02-12 PROCEDURE — ? PRESCRIPTION MEDICATION MANAGEMENT

## 2025-02-12 PROCEDURE — 99213 OFFICE O/P EST LOW 20 MIN: CPT | Mod: 25

## 2025-02-12 PROCEDURE — ? MEDICATION COUNSELING

## 2025-02-12 PROCEDURE — ? LIQUID NITROGEN

## 2025-02-12 PROCEDURE — ? TREATMENT REGIMEN

## 2025-02-12 RX ORDER — FLUCONA/IBUPROF/ITRACON/TERBIN 4-2-1-4 %
1 SOLUTION, NON-ORAL TOPICAL QD
Qty: 15 | Refills: 6 | Status: ERX | COMMUNITY
Start: 2025-02-12

## 2025-02-12 RX ADMIN — Medication 1: at 00:00

## 2025-02-12 ASSESSMENT — LOCATION SIMPLE DESCRIPTION DERM
LOCATION SIMPLE: LEFT HAND
LOCATION SIMPLE: LEFT LOWER BACK
LOCATION SIMPLE: LEFT FOOT
LOCATION SIMPLE: RIGHT HAND
LOCATION SIMPLE: LEFT UPPER BACK
LOCATION SIMPLE: ABDOMEN
LOCATION SIMPLE: UPPER BACK
LOCATION SIMPLE: LEFT 5TH TOE
LOCATION SIMPLE: LEFT FOREHEAD
LOCATION SIMPLE: CHEST
LOCATION SIMPLE: RIGHT UPPER ARM

## 2025-02-12 ASSESSMENT — LOCATION DETAILED DESCRIPTION DERM
LOCATION DETAILED: LEFT SUPERIOR UPPER BACK
LOCATION DETAILED: LEFT INFERIOR FOREHEAD
LOCATION DETAILED: LEFT LATERAL DORSAL FOOT
LOCATION DETAILED: LEFT SUPERIOR MEDIAL LOWER BACK
LOCATION DETAILED: MIDDLE STERNUM
LOCATION DETAILED: RIGHT RADIAL DORSAL HAND
LOCATION DETAILED: INFERIOR THORACIC SPINE
LOCATION DETAILED: LEFT 5TH TOENAIL
LOCATION DETAILED: RIGHT LATERAL PROXIMAL UPPER ARM
LOCATION DETAILED: LEFT SUPERIOR MEDIAL UPPER BACK
LOCATION DETAILED: RIGHT LATERAL SUPERIOR CHEST
LOCATION DETAILED: SUPERIOR THORACIC SPINE
LOCATION DETAILED: PERIUMBILICAL SKIN
LOCATION DETAILED: LEFT RADIAL DORSAL HAND
LOCATION DETAILED: EPIGASTRIC SKIN

## 2025-02-12 ASSESSMENT — LOCATION ZONE DERM
LOCATION ZONE: ARM
LOCATION ZONE: FEET
LOCATION ZONE: FACE
LOCATION ZONE: TOENAIL
LOCATION ZONE: TRUNK
LOCATION ZONE: HAND

## 2025-02-12 NOTE — PROCEDURE: PRESCRIPTION MEDICATION MANAGEMENT
Render In Strict Bullet Format?: No
Detail Level: Zone
Initiate Treatment: Difmetioxrime 4 %-2 %-1 %-4 % topical solution Qd

## 2025-02-12 NOTE — PROCEDURE: LIQUID NITROGEN
Spray Paint Technique: No
Medical Necessity Information: It is in your best interest to select a reason for this procedure from the list below. All of these items fulfill various CMS LCD requirements except the new and changing color options.
Number Of Freeze-Thaw Cycles: 3 freeze-thaw cycles
Show Aperture Variable?: Yes
Medical Necessity Clause: This procedure was medically necessary because the lesions that were treated were:
Post-Care Instructions: I reviewed with the patient in detail post-care instructions. Patient is to wear sunprotection, and avoid picking at any of the treated lesions. Pt may apply Vaseline to crusted or scabbing areas.
Detail Level: Detailed
Duration Of Freeze Thaw-Cycle (Seconds): 10-15
Spray Paint Text: The liquid nitrogen was applied to the skin utilizing a spray paint frosting technique.
Consent: The patient's verbal consent was obtained including but not limited to risks of crusting, scabbing, blistering, scarring, darker or lighter pigmentary change, recurrence, incomplete removal and infection.

## 2025-02-13 ENCOUNTER — RESULTS FOLLOW-UP (OUTPATIENT)
Dept: MEDICAL GROUP | Facility: MEDICAL CENTER | Age: 60
End: 2025-02-13
Payer: COMMERCIAL

## 2025-02-19 ENCOUNTER — TELEPHONE (OUTPATIENT)
Dept: PHYSICAL THERAPY | Facility: MEDICAL CENTER | Age: 60
End: 2025-02-19
Payer: COMMERCIAL

## 2025-02-19 NOTE — OP THERAPY DISCHARGE SUMMARY
Outpatient Physical Therapy  DISCHARGE SUMMARY NOTE      [facility]  [address]  [place] [address]  Phone:  [phone]  Fax:  [phone]    Date of Visit: 02/19/2025    Patient: Christa Juarez MD  YOB: 1965  MRN: [id number]     Referring Provider: No referring provider defined for this encounter.   Referring Diagnosis No admission diagnoses are documented for this encounter.         Functional Assessment Used        Your patient is being discharged from Physical Therapy with the following comments:   Pt has not been seen since 3/14/2024    Comments:  Discharge     Limitations Remaining:  Pt status unknown at this time    Recommendations:  Discharge    Suleiman Fraser, PT    Date: 2/19/2025

## 2025-02-20 ENCOUNTER — TELEPHONE (OUTPATIENT)
Dept: MEDICAL GROUP | Facility: MEDICAL CENTER | Age: 60
End: 2025-02-20
Payer: COMMERCIAL

## 2025-02-20 DIAGNOSIS — E78.5 DYSLIPIDEMIA: ICD-10-CM

## 2025-02-20 RX ORDER — ROSUVASTATIN CALCIUM 10 MG/1
10 TABLET, COATED ORAL EVERY EVENING
Qty: 100 TABLET | Refills: 3 | Status: SHIPPED | OUTPATIENT
Start: 2025-02-20 | End: 2025-05-31

## 2025-04-21 DIAGNOSIS — G43.D0 ABDOMINAL MIGRAINE, NOT INTRACTABLE: ICD-10-CM

## 2025-04-21 RX ORDER — SUMATRIPTAN SUCCINATE 100 MG/1
TABLET ORAL
Qty: 9 TABLET | Refills: 14 | OUTPATIENT
Start: 2025-04-21

## 2025-04-24 DIAGNOSIS — F41.8 ANTICIPATORY ANXIETY: ICD-10-CM

## 2025-04-24 RX ORDER — PROPRANOLOL HCL 20 MG
TABLET ORAL
Qty: 90 TABLET | Refills: 0 | Status: SHIPPED | OUTPATIENT
Start: 2025-04-24

## 2025-05-29 ENCOUNTER — NON-PROVIDER VISIT (OUTPATIENT)
Dept: MEDICAL GROUP | Facility: MEDICAL CENTER | Age: 60
End: 2025-05-29
Payer: COMMERCIAL

## 2025-05-29 ENCOUNTER — TELEPHONE (OUTPATIENT)
Dept: MEDICAL GROUP | Facility: MEDICAL CENTER | Age: 60
End: 2025-05-29

## 2025-05-29 DIAGNOSIS — Z23 NEED FOR VACCINATION: Primary | ICD-10-CM

## 2025-05-29 DIAGNOSIS — Z13.1 SCREENING FOR DIABETES MELLITUS (DM): ICD-10-CM

## 2025-05-29 DIAGNOSIS — R51.9 NONINTRACTABLE HEADACHE, UNSPECIFIED CHRONICITY PATTERN, UNSPECIFIED HEADACHE TYPE: ICD-10-CM

## 2025-05-29 DIAGNOSIS — Z13.6 SCREENING FOR ISCHEMIC HEART DISEASE: ICD-10-CM

## 2025-05-29 DIAGNOSIS — L30.9 ECZEMA, UNSPECIFIED TYPE: ICD-10-CM

## 2025-05-29 RX ORDER — TRIAMCINOLONE ACETONIDE 1 MG/G
1 OINTMENT TOPICAL 2 TIMES DAILY
Qty: 1 EACH | Refills: 0 | Status: SHIPPED | OUTPATIENT
Start: 2025-05-29 | End: 2025-07-13

## 2025-05-29 RX ORDER — CLOBETASOL PROPIONATE 0.5 MG/G
1 OINTMENT TOPICAL 2 TIMES DAILY
Qty: 60 G | Refills: 0 | Status: SHIPPED | OUTPATIENT
Start: 2025-05-29

## 2025-05-29 RX ORDER — SUMATRIPTAN SUCCINATE 100 MG/1
100 TABLET ORAL
Qty: 10 TABLET | Refills: 3 | Status: SHIPPED | OUTPATIENT
Start: 2025-05-29

## 2025-05-29 NOTE — TELEPHONE ENCOUNTER
Was the patient seen in the last year in this department? Yes   Does patient have an active prescription for medications requested? No   Received Request Via: Pharmacy  Pt also requested:  1) Lipid panel   2) clobetasol ointment

## 2025-05-29 NOTE — PROGRESS NOTES
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

## 2025-07-17 ENCOUNTER — OFFICE VISIT (OUTPATIENT)
Dept: PHYSICAL MEDICINE AND REHAB | Facility: MEDICAL CENTER | Age: 60
End: 2025-07-17
Payer: COMMERCIAL

## 2025-07-17 VITALS
BODY MASS INDEX: 20.62 KG/M2 | WEIGHT: 123.8 LBS | OXYGEN SATURATION: 97 % | TEMPERATURE: 97.8 F | HEART RATE: 63 BPM | DIASTOLIC BLOOD PRESSURE: 73 MMHG | HEIGHT: 65 IN | SYSTOLIC BLOOD PRESSURE: 121 MMHG

## 2025-07-17 DIAGNOSIS — G89.29 CHRONIC NECK PAIN: ICD-10-CM

## 2025-07-17 DIAGNOSIS — M54.12 CERVICAL RADICULOPATHY: ICD-10-CM

## 2025-07-17 DIAGNOSIS — M62.838 MUSCLE SPASM: ICD-10-CM

## 2025-07-17 DIAGNOSIS — M54.2 CHRONIC NECK PAIN: ICD-10-CM

## 2025-07-17 DIAGNOSIS — M48.02 CERVICAL SPINAL STENOSIS: ICD-10-CM

## 2025-07-17 DIAGNOSIS — M50.30 DDD (DEGENERATIVE DISC DISEASE), CERVICAL: ICD-10-CM

## 2025-07-17 DIAGNOSIS — M47.812 CERVICAL SPONDYLOSIS: Primary | ICD-10-CM

## 2025-07-17 PROCEDURE — 3074F SYST BP LT 130 MM HG: CPT | Performed by: PHYSICAL MEDICINE & REHABILITATION

## 2025-07-17 PROCEDURE — 1125F AMNT PAIN NOTED PAIN PRSNT: CPT | Performed by: PHYSICAL MEDICINE & REHABILITATION

## 2025-07-17 PROCEDURE — 99214 OFFICE O/P EST MOD 30 MIN: CPT | Performed by: PHYSICAL MEDICINE & REHABILITATION

## 2025-07-17 PROCEDURE — 3078F DIAST BP <80 MM HG: CPT | Performed by: PHYSICAL MEDICINE & REHABILITATION

## 2025-07-17 RX ORDER — MELOXICAM 15 MG/1
15 TABLET ORAL
Qty: 60 TABLET | Refills: 0 | Status: SHIPPED | OUTPATIENT
Start: 2025-07-17

## 2025-07-17 ASSESSMENT — PATIENT HEALTH QUESTIONNAIRE - PHQ9: CLINICAL INTERPRETATION OF PHQ2 SCORE: 0

## 2025-07-17 ASSESSMENT — PAIN SCALES - GENERAL: PAINLEVEL_OUTOF10: 5=MODERATE PAIN

## 2025-07-17 ASSESSMENT — FIBROSIS 4 INDEX: FIB4 SCORE: 1.41

## 2025-07-17 NOTE — PROGRESS NOTES
[facility] (Physical Medicine and Rehabilitation)  Interventional Pain and Spine        Chief complaint:   Chief Complaint   Patient presents with    Follow-Up     Neck pain          HISTORY    Please see new patient note by Dr Briscoe,  for more details.     HPI  Patient identification: Christa Juarez MD ,  1965,   With The primary encounter diagnosis was Cervical spondylosis. Diagnoses of Cervical spinal stenosis, Chronic neck pain, DDD (degenerative disc disease), cervical, Cervical radiculopathy, and Muscle spasm were also pertinent to this visit.     Procedures:  2024 medial branch blocks targeting the right C3-4, C4-5, C5-6 facet joints with fluoroscopic guidance. The patient had 100% pain relief after the diagnostic medial branch blocks targeting the same levels during the diagnostic phase.  Preprocedure pain 6/10 NRS postprocedure pain 1/10 NRS.   2024 medial branch blocks targeting the right C3-4, C4-5, C5-6 facet joints with fluoroscopic guidance. The patient had 100% pain relief after the diagnostic medial branch blocks targeting the same levels during the diagnostic phase.  Preprocedure pain 6/10 NRS postprocedure pain 1/10 NRS.   9/3/2024 right medial branch radiofrequency neurotomy targeting the C3-4, C4-5, C5-6 facet joints 100% pain relief at the follow-up visit with preprocedure pain 5/10 NRS postprocedure pain 0/10 NRS.        History of Present Illness  The patient, a 60-year-old female, presents for a follow-up visit.    Chronic Right-Sided Cervicalgia  She has a history of chronic right-sided cervicalgia, described as axial neck pain and periscapular discomfort. She previously experienced significant pain relief following a medial branch radiofrequency neurotomy performed on 2024, targeting the right cervical C3-4, C4-5, and C5-6 facet joints, which provided her with complete pain relief for over six months. However, the pain has recently recurred, now  presenting as moderate pain with an intensity of 5 out of 10 in the same region, exacerbated by lateral head movements.  - Onset: Recurrence of pain during a trip to [place] in June 2025.  - Location: Right cervical C3-4, C4-5, and C5-6 facet joints.  - Duration: Pain relief for over six months following the procedure; recent recurrence.  - Character: Axial neck pain and periscapular discomfort, moderate pain with an intensity of 5 out of 10.  - Alleviating/Aggravating Factors: Exacerbated by lateral head movements; managed with acetaminophen and rest.  - Severity: Moderate pain with an intensity of 5 out of 10.    The patient reports the return of her cervicalgia, which she has been managing with acetaminophen and rest. She recalls the onset of her current symptoms during a trip to [place] in June 2025. She engages in pickleball once or twice every two weeks and began golfing two weeks ago. Additionally, she attended a painting class over the weekend and participated in a weight class, lifting 3 to 5 pounds. She experienced a migraine on Saturday, for which she took sumatriptan (Imitrex). She also took half a tablet of methocarbamol on Friday night, resulting in a headache the following day. She subsequently took gabapentin (Neurontin) and another dose of sumatriptan. She administered meloxicam yesterday and attempted to avoid further medication until 3:00 PM. She spent most of the day in bed but managed to walk her dogs for 3.5 miles. She has been massaging her neck and reports feeling stronger than before. She has been seeing a physical therapist for knee issues that arose while skiing. She has been taking additional yoga classes and notes improvement in her arm strength. She believes that golfing may have exacerbated her symptoms. She has been taking over-the-counter famotidine (Pepcid) for gastroesophageal discomfort induced by meloxicam.            ROS Red Flags :   Fever, Chills, Sweats: Denies  Involuntary  Weight Loss: Denies  Bowel/Bladder Incontinence: Denies  Saddle Anesthesia: Denies        PMHx:   Past Medical History:   Diagnosis Date    Anesthesia     Nausea in recovery    Arthritis     High cholesterol        PSHx:   Past Surgical History:   Procedure Laterality Date    RADIO FREQUENCY ABLATION ADDITIONAL LEVEL Right 9/3/2024    Procedure: Radiofrequency neurotomy targeting the medial branches innervating the RIGHT cervical 3-4, cervical 4-5, cervical 5-6 facet joints with sedation….. Note: Plan for 80 °C for 90 seconds for each neurotomy;  Surgeon: Akira Briscoe M.D.;  [facility];  Service: Pain Management    INJ,ANES LUMBAR/CERVICAL Right 2024    Procedure: Medial branch blocks targeting the RIGHT C3-4, C4-5, and C5-6 facet joints with fluoroscopic guidance #2;  Surgeon: Akira Briscoe M.D.;  [facility];  Service: Pain Management    INJ,ANES LUMBAR/CERVICAL Right 2024    Procedure: Medial branch blocks targeting the RIGHT C3-4, C4-5, and C5-6 facet joints with fluoroscopic guidance #1;  Surgeon: Akira Briscoe M.D.;  [facility];  Service: Pain Management    BONE BIOPSY Left 2016    Procedure: WRIST BIOPSY WITH POSSIBLE BONE GRAFT ALLO ;  Surgeon: Benjamin Menezes M.D.;  Location: SURGERY [facility];  Service:     BLEPHAROPLASTY      CHOLECYSTECTOMY      PRIMARY C SECTION             Family history   Family History   Problem Relation Age of Onset    Cancer Other     Cancer Paternal Aunt     Arthritis Mother     Hyperlipidemia Mother     Arthritis Brother     Cancer Brother         Prostate         Medications:   Outpatient Medications Marked as Taking for the 25 encounter (Office Visit) with Akira Briscoe M.D.   Medication Sig Dispense Refill    meloxicam (MOBIC) 15 MG tablet Take 1 Tablet by mouth 1 time a day as needed (pain). Do not take other NSAIDs. No refills. 60  Tablet 0    sumatriptan (IMITREX) 100 MG tablet Take 1 Tablet by mouth one time as needed for Migraine for up to 1 dose. 10 Tablet 3    clobetasol (TEMOVATE) 0.05 % Ointment Apply 1 Application topically 2 times a day. 60 g 0    propranolol (INDERAL) 20 MG Tab TAKE 1 TABLET BY MOUTH 30 MIN BEFORE THE EVENT FOR ANTICIPATORY ANXIETY. 90 Tablet 0    gabapentin (NEURONTIN) 100 MG Cap Take 1-3 Capsules by mouth at bedtime as needed (pain). 90 Capsule 11    sumatriptan (IMITREX) 100 MG tablet TAKE 1 TABLET BY MOUTH EVERY DAY AS NEEDED FOR HEAD ACHE 10 Tablet 12    methocarbamol (ROBAXIN) 500 MG Tab Take 1 Tablet by mouth 3 times a day as needed (neck pain). 30 Tablet 1        Current Outpatient Medications on File Prior to Visit   Medication Sig Dispense Refill    sumatriptan (IMITREX) 100 MG tablet Take 1 Tablet by mouth one time as needed for Migraine for up to 1 dose. 10 Tablet 3    clobetasol (TEMOVATE) 0.05 % Ointment Apply 1 Application topically 2 times a day. 60 g 0    propranolol (INDERAL) 20 MG Tab TAKE 1 TABLET BY MOUTH 30 MIN BEFORE THE EVENT FOR ANTICIPATORY ANXIETY. 90 Tablet 0    gabapentin (NEURONTIN) 100 MG Cap Take 1-3 Capsules by mouth at bedtime as needed (pain). 90 Capsule 11    sumatriptan (IMITREX) 100 MG tablet TAKE 1 TABLET BY MOUTH EVERY DAY AS NEEDED FOR HEAD ACHE 10 Tablet 12    methocarbamol (ROBAXIN) 500 MG Tab Take 1 Tablet by mouth 3 times a day as needed (neck pain). 30 Tablet 1    ondansetron (ZOFRAN) 4 MG Tab tablet TAKE 1 TABLET BY MOUTH TWICE A DAY AS NEEDED FOR NAUSEA AND VOMITING      COVID-19mRNA Bival Vacc Pfizer (PFIZER COVID-19 BIVALENT) 30 MCG/0.3ML Suspension injection Inject  into the shoulder, thigh, or buttocks. (Patient not taking: Reported on 4/24/2023) 0.3 mL 0    Calcium-Magnesium-Vitamin D (CALCIUM MAGNESIUM PO) Take  by mouth every day. (Patient not taking: Reported on 7/17/2025)       No current facility-administered medications on file prior to visit.          Allergies:   Allergies   Allergen Reactions    Penicillins Hives    Diagnostic X-Ray Materials Hives, Itching and Rash    Zanaflex [Tizanidine]      headache       Social Hx:   Social History     Socioeconomic History    Marital status:      Spouse name: Not on file    Number of children: Not on file    Years of education: Not on file    Highest education level: Professional school degree (e.g., MD, DDS, DVM, AMISHA)   Occupational History    Not on file   Tobacco Use    Smoking status: Never    Smokeless tobacco: Never   Vaping Use    Vaping status: Never Used   Substance and Sexual Activity    Alcohol use: Yes     Comment: Occasional    Drug use: No    Sexual activity: Yes     Comment: Radiation Oncologist   Other Topics Concern    Not on file   Social History Narrative    Not on file     Social Drivers of Health     Financial Resource Strain: Low Risk  (8/2/2020)    Overall Financial Resource Strain (CARDIA)     Difficulty of Paying Living Expenses: Not hard at all   Food Insecurity: No Food Insecurity (8/2/2020)    Hunger Vital Sign     Worried About Running Out of Food in the Last Year: Never true     Ran Out of Food in the Last Year: Never true   Transportation Needs: No Transportation Needs (8/2/2020)    PRAPARE - Transportation     Lack of Transportation (Medical): No     Lack of Transportation (Non-Medical): No   Physical Activity: Sufficiently Active (8/2/2020)    Exercise Vital Sign     Days of Exercise per Week: 4 days     Minutes of Exercise per Session: 50 min   Stress: No Stress Concern Present (8/2/2020)    [place] [employer] - Occupational Stress Questionnaire     Feeling of Stress : Not at all   Social Connections: Moderately Isolated (8/2/2020)    Social Connection and Isolation Panel [NHANES]     Frequency of Communication with Friends and Family: More than three times a week     Frequency of Social Gatherings with Friends and Family: Three times a week      "Attends [employer] Services: Never     Active Member of Clubs or Organizations: No     Attends Club or Organization Meetings: Never     Marital Status:    Intimate Partner Violence: Not on file   Housing Stability: Low Risk  (8/2/2020)    Housing Stability Vital Sign     Unable to Pay for Housing in the Last Year: No     Number of Places Lived in the Last Year: 1     Unstable Housing in the Last Year: No         EXAMINATION     Physical Exam:   Vitals: /73 (BP Location: Right arm, Patient Position: Sitting, BP Cuff Size: Adult)   Pulse 63   Temp 36.6 °C (97.8 °F) (Temporal)   Ht 1.651 m (5' 5\")   Wt 56.2 kg (123 lb 12.8 oz)   SpO2 97%     Constitutional:   Body [place]itus: Body mass index is 20.6 kg/m².  Cooperation: Fully cooperates with exam  Appearance: Well-groomed no disheveled    Respiratory-  breathing comfortable on room air, no audible wheezing  Cardiovascular- capillary refills less than 2 seconds. No lower extremity edema is noted.   Psychiatric- alert and oriented ×3. Normal affect.    MSK and Neuro: -  Physical Exam  Strength is 5 out of 5 in the bilateral upper extremities. Sensation is intact.  No tenderness to palpation throughout the cervical spine.  - Cervical spine: Facet loading maneuver is positive on the right, negative on the left.  - Bilateral upper extremities: Strength is 5 out of 5.  Spurling's maneuver is negative bilaterally.          MEDICAL DECISION MAKING    DATA    Labs:   Lab Results   Component Value Date/Time    SODIUM 131 (L) 02/11/2025 04:32 PM    POTASSIUM 3.9 02/11/2025 04:32 PM    CHLORIDE 96 02/11/2025 04:32 PM    CO2 25 02/11/2025 04:32 PM    GLUCOSE 79 02/11/2025 04:32 PM    BUN 8 02/11/2025 04:32 PM    CREATININE 0.76 02/11/2025 04:32 PM        No results found for: \"PROTHROMBTM\", \"INR\"     Lab Results   Component Value Date/Time    WBC 6.0 02/11/2025 04:32 PM    RBC 4.48 02/11/2025 04:32 PM    HEMOGLOBIN 13.8 02/11/2025 04:32 PM    HEMATOCRIT 42.2 " "02/11/2025 04:32 PM    MCV 94.2 02/11/2025 04:32 PM    MCH 30.8 02/11/2025 04:32 PM    MCHC 32.7 02/11/2025 04:32 PM    MPV 10.9 02/11/2025 04:32 PM    NEUTSPOLYS 51.50 02/11/2025 04:32 PM    LYMPHOCYTES 37.50 02/11/2025 04:32 PM    MONOCYTES 9.20 02/11/2025 04:32 PM    EOSINOPHILS 0.70 02/11/2025 04:32 PM    BASOPHILS 0.80 02/11/2025 04:32 PM        No results found for: \"HBA1C\"       Imaging:   I personally reviewed following images    MRI cervical spine 2/20/2023  T2 subacute compression fracture.  Multilevel degenerative changes with facet arthropathy and foraminal stenosis.    MRI left elbow 11/3/2023    Results         I reviewed the following radiology reports                     Results for orders placed in visit on 02/24/23    MR-CERVICAL SPINE-W/O  FINDINGS:  There is subacute fracture along the anterior superior corner of T2 vertebral body. Bone marrow edema is noted along the superior portion. There is no disruption of the posterior cortex. The anterior and posterior longitudinal ligaments are intact. There   is no epidural hemorrhage. There is no evidence of spinal cord injury.     The cervical spine maintains normal height and alignment. There is no fracture or dislocation. There is no pathologic marrow infiltration. There is no focal osseous lesion.     At the level of C2-3,there is no spinal or neural foraminal stenosis.     At the level of C3-4,there is minimal disc bulge. There is no spinal or neural foraminal stenosis.     At the level of C4-5,there is disc degeneration. There is diffuse disc bulge. Bilateral uncinate joint arthropathy is seen. There is effacement of the ventral subarachnoid space. There is severe right and mild left neural foraminal stenosis.     At the level of C5-6,there is disc degeneration. Bilateral uncinate joint arthropathy is seen. There is severe bilateral neural foraminal stenosis. There is effacement of the ventral subarachnoid space. There is flattening of the " anterior spinal cord   contour. There is mild central canal stenosis.     At the level of C6-7,there is disc degeneration. There is diffuse disc bulge. Bilateral uncinate joint arthropathy is seen. There is moderate bilateral neural foraminal stenosis. There is no central canal stenosis.     At the level of C7-T1,there is no spinal or neural foraminal stenosis.     The visualized posterior fossa structures appear normal within limits.  The cervical spinal cord does not demonstrate any mass or abnormal T2 signal intensity.     There is tiny 4 mm sized left thyroid nodule.    Impression  1.  There is subacute fracture along the anterior superior corner of T2 vertebral body. Bone marrow edema is noted along the superior portion. There is no disruption of the posterior cortex. The anterior and posterior longitudinal ligaments are intact.  There is no epidural hemorrhage. This is new since the previous study. There is no evidence of spinal cord injury.  2.  Multifocal degenerative disease in the cervical spine as described above. There has been no significant interval change.  3.  There is tiny 4 mm sized left thyroid nodule stable since the previous study.      Results for orders placed during the hospital encounter of 12/20/19    MR-LUMBAR SPINE-W/O    Impression  1.  Mild degenerative changes of the lumbar spine  2.  No areas of high-grade central canal or neural foraminal narrowing  3.  Mild LEFT neural foraminal narrowing at L4-L5  4.  Segment 1 hepatic lesion, incompletely assessed but likely a cyst or hemangioma        Results for orders placed during the hospital encounter of 12/20/19    MR-LUMBAR SPINE-W/O    Impression  1.  Mild degenerative changes of the lumbar spine  2.  No areas of high-grade central canal or neural foraminal narrowing  3.  Mild LEFT neural foraminal narrowing at L4-L5  4.  Segment 1 hepatic lesion, incompletely assessed but likely a cyst or hemangioma                                                                        Results for orders placed during the hospital encounter of 22    CT-ABDOMEN-PELVIS WITH    Impression  1.  There is no evidence of bowel obstruction or acute inflammation.  2.  There is air in the splenic flexure and descending colon and stool in the right colon but there is no colonic dilatation.  3.  No extraluminal air or free intraperitoneal air.  4.  No abnormal vascular enhancement.  5.  Gallbladder is surgically absent with probable physiologic dilatation of the intrahepatic and extra hepatic biliary tree. No obstructive mass or stone.  6.  Normal appendix.  7.  Incidental benign hepatic hemangiomas.                    Results for orders placed in visit on 23    DX-CERVICAL SPINE-2 OR 3 VIEWS    Impression  1.  Multilevel facet arthropathy and endplate spurring    2.  Minimal posterior subluxations at C4-5 and C5-6        Results for orders placed in visit on 23    DX-ELBOW-COMPLETE 3+ LEFT      Results for orders placed in visit on 22    DX-FOOT-COMPLETE 3+ RIGHT                               Results for orders placed in visit on 21    DX-WRIST-COMPLETE 3+ LEFT         DIAGNOSIS   Visit Diagnoses     ICD-10-CM   1. Cervical spondylosis  M47.812   2. Cervical spinal stenosis  M48.02   3. Chronic neck pain  M54.2    G89.29   4. DDD (degenerative disc disease), cervical  M50.30   5. Cervical radiculopathy  M54.12   6. Muscle spasm  M62.838           ASSESSMENT and PLAN:     Christa Juarez MD  1965 female      Christa was seen today for follow-up.    Diagnoses and all orders for this visit:    Cervical spondylosis  -     meloxicam (MOBIC) 15 MG tablet; Take 1 Tablet by mouth 1 time a day as needed (pain). Do not take other NSAIDs. No refills.    Cervical spinal stenosis  -     meloxicam (MOBIC) 15 MG tablet; Take 1 Tablet by mouth 1 time a day as needed (pain). Do not take other NSAIDs. No refills.    Chronic neck pain  -     meloxicam  (MOBIC) 15 MG tablet; Take 1 Tablet by mouth 1 time a day as needed (pain). Do not take other NSAIDs. No refills.    DDD (degenerative disc disease), cervical  -     meloxicam (MOBIC) 15 MG tablet; Take 1 Tablet by mouth 1 time a day as needed (pain). Do not take other NSAIDs. No refills.    Cervical radiculopathy  -     meloxicam (MOBIC) 15 MG tablet; Take 1 Tablet by mouth 1 time a day as needed (pain). Do not take other NSAIDs. No refills.    Muscle spasm  -     meloxicam (MOBIC) 15 MG tablet; Take 1 Tablet by mouth 1 time a day as needed (pain). Do not take other NSAIDs. No refills.      Assessment & Plan  Chronic right-sided neck pain. The patient's chronic right-sided neck pain has returned, with moderate pain rated 5 out of 10, worsening with head movement. Physical exam findings include strength 5 out of 5 in bilateral upper extremities, intact sensation, negative Spurling's maneuver bilaterally, positive facet loading maneuver on the right, and no tenderness to palpation throughout the cervical spine. Discussed the potential flare-up due to recent activities such as golfing and painting.   - The patient was happy with physical therapy where she had an active physical therapy as well as with [facility].  However she would like to see a private physical therapist because of her busy schedule.  A referral to a private physical therapist, Chavez Shoemaker, has been made for further evaluation and treatment.  - Prescribed meloxicam to be taken daily for two weeks, then as needed. Advised to take Pepcid twice daily to manage potential indigestion from meloxicam. A prescription for meloxicam has been sent to the pharmacy. Advised to continue golfing lessons at half speed, focusing on technique rather than maximum distance and scores. If there is no improvement, a repeat ablation procedure will be considered.    Follow-up: A follow-up visit is scheduled in 2 months.          Thank you for allowing me to  participate in the care of this patient. If you have any questions please not hesitate to contact me.             Please note that this dictation was created using voice recognition software. I have made every reasonable attempt to correct obvious errors but there may be errors of grammar and content that I may have overlooked prior to finalization of this note.      Akira Briscoe MD  Interventional Spine and Sports Physiatry  Physical Medicine and Rehabilitation  [facility]

## 2025-08-12 ENCOUNTER — APPOINTMENT (OUTPATIENT)
Dept: URBAN - METROPOLITAN AREA CLINIC 6 | Facility: CLINIC | Age: 60
Setting detail: DERMATOLOGY
End: 2025-08-12

## 2025-08-12 DIAGNOSIS — L81.4 OTHER MELANIN HYPERPIGMENTATION: ICD-10-CM

## 2025-08-12 DIAGNOSIS — Z85.828 PERSONAL HISTORY OF OTHER MALIGNANT NEOPLASM OF SKIN: ICD-10-CM | Status: STABLE

## 2025-08-12 DIAGNOSIS — L82.0 INFLAMED SEBORRHEIC KERATOSIS: ICD-10-CM

## 2025-08-12 DIAGNOSIS — L72.0 EPIDERMAL CYST: ICD-10-CM

## 2025-08-12 DIAGNOSIS — L57.0 ACTINIC KERATOSIS: ICD-10-CM

## 2025-08-12 DIAGNOSIS — Z87.2 PERSONAL HISTORY OF DISEASES OF THE SKIN AND SUBCUTANEOUS TISSUE: ICD-10-CM | Status: STABLE

## 2025-08-12 DIAGNOSIS — D18.0 HEMANGIOMA: ICD-10-CM

## 2025-08-12 DIAGNOSIS — L85.3 XEROSIS CUTIS: ICD-10-CM

## 2025-08-12 DIAGNOSIS — D22 MELANOCYTIC NEVI: ICD-10-CM

## 2025-08-12 DIAGNOSIS — Z71.89 OTHER SPECIFIED COUNSELING: ICD-10-CM

## 2025-08-12 DIAGNOSIS — B35.1 TINEA UNGUIUM: ICD-10-CM | Status: STABLE

## 2025-08-12 DIAGNOSIS — L82.1 OTHER SEBORRHEIC KERATOSIS: ICD-10-CM

## 2025-08-12 DIAGNOSIS — D17 BENIGN LIPOMATOUS NEOPLASM: ICD-10-CM

## 2025-08-12 PROBLEM — D17.24 BENIGN LIPOMATOUS NEOPLASM OF SKIN AND SUBCUTANEOUS TISSUE OF LEFT LEG: Status: ACTIVE | Noted: 2025-08-12

## 2025-08-12 PROBLEM — D18.01 HEMANGIOMA OF SKIN AND SUBCUTANEOUS TISSUE: Status: ACTIVE | Noted: 2025-08-12

## 2025-08-12 PROBLEM — D22.5 MELANOCYTIC NEVI OF TRUNK: Status: ACTIVE | Noted: 2025-08-12

## 2025-08-12 PROCEDURE — ? TREATMENT REGIMEN

## 2025-08-12 PROCEDURE — ? DEFER

## 2025-08-12 PROCEDURE — ? LIQUID NITROGEN

## 2025-08-12 PROCEDURE — ? PRESCRIPTION MEDICATION MANAGEMENT

## 2025-08-12 PROCEDURE — ? BENIGN DESTRUCTION COSMETIC

## 2025-08-12 PROCEDURE — ? COUNSELING

## 2025-08-12 PROCEDURE — ? SUNSCREEN TREATMENT REGIMEN

## 2025-08-12 PROCEDURE — ? MEDICATION COUNSELING

## 2025-08-12 ASSESSMENT — LOCATION SIMPLE DESCRIPTION DERM
LOCATION SIMPLE: LEFT LOWER BACK
LOCATION SIMPLE: LEFT HAND
LOCATION SIMPLE: RIGHT UPPER ARM
LOCATION SIMPLE: LEFT UPPER BACK
LOCATION SIMPLE: NOSE
LOCATION SIMPLE: LEFT 5TH TOE
LOCATION SIMPLE: LEFT POSTERIOR THIGH
LOCATION SIMPLE: UPPER BACK
LOCATION SIMPLE: RIGHT HAND
LOCATION SIMPLE: CHEST
LOCATION SIMPLE: LEFT FOOT
LOCATION SIMPLE: RIGHT LIP
LOCATION SIMPLE: RIGHT UPPER BACK
LOCATION SIMPLE: LEFT UPPER BACK
LOCATION SIMPLE: LEFT FOREHEAD
LOCATION SIMPLE: ABDOMEN

## 2025-08-12 ASSESSMENT — LOCATION DETAILED DESCRIPTION DERM
LOCATION DETAILED: LEFT SUPERIOR UPPER BACK
LOCATION DETAILED: LEFT 5TH TOENAIL
LOCATION DETAILED: NASAL DORSUM
LOCATION DETAILED: LEFT SUPERIOR MEDIAL UPPER BACK
LOCATION DETAILED: RIGHT SUPERIOR MEDIAL UPPER BACK
LOCATION DETAILED: LEFT PROXIMAL POSTERIOR THIGH
LOCATION DETAILED: LEFT SUPERIOR FOREHEAD
LOCATION DETAILED: LEFT MID-UPPER BACK
LOCATION DETAILED: LEFT INFERIOR FOREHEAD
LOCATION DETAILED: RIGHT RADIAL DORSAL HAND
LOCATION DETAILED: RIGHT LATERAL PROXIMAL UPPER ARM
LOCATION DETAILED: EPIGASTRIC SKIN
LOCATION DETAILED: INFERIOR THORACIC SPINE
LOCATION DETAILED: MIDDLE STERNUM
LOCATION DETAILED: LEFT SUPERIOR MEDIAL LOWER BACK
LOCATION DETAILED: LEFT LATERAL DORSAL FOOT
LOCATION DETAILED: RIGHT LATERAL SUPERIOR CHEST
LOCATION DETAILED: LEFT RADIAL DORSAL HAND
LOCATION DETAILED: RIGHT UPPER CUTANEOUS LIP
LOCATION DETAILED: PERIUMBILICAL SKIN

## 2025-08-12 ASSESSMENT — LOCATION ZONE DERM
LOCATION ZONE: TRUNK
LOCATION ZONE: FACE
LOCATION ZONE: FEET
LOCATION ZONE: TRUNK
LOCATION ZONE: LIP
LOCATION ZONE: TOENAIL
LOCATION ZONE: LEG
LOCATION ZONE: HAND
LOCATION ZONE: ARM
LOCATION ZONE: NOSE

## 2025-08-21 ENCOUNTER — OFFICE VISIT (OUTPATIENT)
Dept: MEDICAL GROUP | Facility: MEDICAL CENTER | Age: 60
End: 2025-08-21
Payer: COMMERCIAL

## 2025-08-21 VITALS
SYSTOLIC BLOOD PRESSURE: 120 MMHG | HEART RATE: 81 BPM | DIASTOLIC BLOOD PRESSURE: 72 MMHG | BODY MASS INDEX: 20.58 KG/M2 | HEIGHT: 65 IN | OXYGEN SATURATION: 98 % | TEMPERATURE: 98.1 F | WEIGHT: 123.5 LBS | RESPIRATION RATE: 18 BRPM

## 2025-08-21 DIAGNOSIS — R14.0 BLOATING: Primary | ICD-10-CM

## 2025-08-21 DIAGNOSIS — K63.8219 SMALL INTESTINAL BACTERIAL OVERGROWTH (SIBO): ICD-10-CM

## 2025-08-21 DIAGNOSIS — K59.1 FUNCTIONAL DIARRHEA: ICD-10-CM

## 2025-08-21 PROCEDURE — 99215 OFFICE O/P EST HI 40 MIN: CPT | Performed by: FAMILY MEDICINE

## 2025-08-21 PROCEDURE — 3074F SYST BP LT 130 MM HG: CPT | Performed by: FAMILY MEDICINE

## 2025-08-21 PROCEDURE — 3078F DIAST BP <80 MM HG: CPT | Performed by: FAMILY MEDICINE

## 2025-08-21 ASSESSMENT — FIBROSIS 4 INDEX: FIB4 SCORE: 1.41

## 2025-08-25 RX ORDER — METRONIDAZOLE 500 MG/1
500 TABLET ORAL EVERY 8 HOURS
Qty: 42 TABLET | Refills: 0 | Status: SHIPPED | OUTPATIENT
Start: 2025-08-25 | End: 2025-09-08

## 2025-08-25 RX ORDER — CIPROFLOXACIN 500 MG/1
500 TABLET, FILM COATED ORAL 2 TIMES DAILY
Qty: 28 TABLET | Refills: 0 | Status: SHIPPED | OUTPATIENT
Start: 2025-08-25 | End: 2025-09-08

## 2025-09-16 ENCOUNTER — APPOINTMENT (OUTPATIENT)
Dept: MEDICAL GROUP | Facility: MEDICAL CENTER | Age: 60
End: 2025-09-16
Payer: COMMERCIAL